# Patient Record
Sex: MALE | Race: WHITE | Employment: FULL TIME | URBAN - NONMETROPOLITAN AREA
[De-identification: names, ages, dates, MRNs, and addresses within clinical notes are randomized per-mention and may not be internally consistent; named-entity substitution may affect disease eponyms.]

---

## 2020-12-19 ENCOUNTER — APPOINTMENT (OUTPATIENT)
Dept: CT IMAGING | Age: 60
End: 2020-12-19

## 2020-12-19 ENCOUNTER — HOSPITAL ENCOUNTER (INPATIENT)
Age: 60
LOS: 4 days | Discharge: HOME | DRG: 683 | End: 2020-12-23
Attending: INTERNAL MEDICINE | Admitting: INTERNAL MEDICINE

## 2020-12-19 ENCOUNTER — HOSPITAL ENCOUNTER (EMERGENCY)
Age: 60
Discharge: ANOTHER HOSPITAL | End: 2020-12-19
Attending: EMERGENCY MEDICINE

## 2020-12-19 VITALS
TEMPERATURE: 98.1 F | RESPIRATION RATE: 26 BRPM | WEIGHT: 260 LBS | OXYGEN SATURATION: 92 % | HEART RATE: 89 BPM | SYSTOLIC BLOOD PRESSURE: 169 MMHG | DIASTOLIC BLOOD PRESSURE: 113 MMHG

## 2020-12-19 PROBLEM — N17.9 ACUTE KIDNEY INJURY (HCC): Status: ACTIVE | Noted: 2020-12-19

## 2020-12-19 PROBLEM — R60.0 LEG EDEMA, RIGHT: Status: ACTIVE | Noted: 2020-12-19

## 2020-12-19 PROBLEM — N13.30 BILATERAL HYDRONEPHROSIS: Status: ACTIVE | Noted: 2020-12-19

## 2020-12-19 PROBLEM — N32.0 BLADDER OBSTRUCTION: Status: ACTIVE | Noted: 2020-12-19

## 2020-12-19 PROBLEM — N28.89 OBSTRUCTION OF KIDNEY: Status: ACTIVE | Noted: 2020-12-19

## 2020-12-19 LAB
-: ABNORMAL
ABSOLUTE EOS #: 0 K/UL (ref 0–0.44)
ABSOLUTE IMMATURE GRANULOCYTE: 0 K/UL (ref 0–0.3)
ABSOLUTE LYMPH #: 0.69 K/UL (ref 1.1–3.7)
ABSOLUTE MONO #: 0.14 K/UL (ref 0.1–1.2)
ALBUMIN SERPL-MCNC: 4.3 G/DL (ref 3.5–5.2)
ALBUMIN/GLOBULIN RATIO: 1 (ref 1–2.5)
ALLEN TEST: ABNORMAL
ALP BLD-CCNC: 94 U/L (ref 40–129)
ALT SERPL-CCNC: 14 U/L (ref 5–41)
AMORPHOUS: ABNORMAL
ANION GAP SERPL CALCULATED.3IONS-SCNC: 14 MMOL/L (ref 9–17)
ANION GAP SERPL CALCULATED.3IONS-SCNC: 20 MMOL/L (ref 9–17)
AST SERPL-CCNC: 10 U/L
BACTERIA: ABNORMAL
BASOPHILS # BLD: 0 % (ref 0–2)
BASOPHILS ABSOLUTE: 0 K/UL (ref 0–0.2)
BILIRUB SERPL-MCNC: 0.33 MG/DL (ref 0.3–1.2)
BILIRUBIN URINE: NEGATIVE
BUN BLDV-MCNC: 126 MG/DL (ref 8–23)
BUN BLDV-MCNC: >336 MG/DL (ref 8–23)
BUN/CREAT BLD: ABNORMAL (ref 9–20)
BUN/CREAT BLD: ABNORMAL (ref 9–20)
CALCIUM SERPL-MCNC: 10 MG/DL (ref 8.6–10.4)
CALCIUM SERPL-MCNC: 9.4 MG/DL (ref 8.6–10.4)
CARBOXYHEMOGLOBIN: ABNORMAL % (ref 0–5)
CASTS UA: ABNORMAL /LPF
CHLORIDE BLD-SCNC: 102 MMOL/L (ref 98–107)
CHLORIDE BLD-SCNC: 99 MMOL/L (ref 98–107)
CO2: 17 MMOL/L (ref 20–31)
CO2: 17 MMOL/L (ref 20–31)
COLOR: YELLOW
COMMENT UA: ABNORMAL
CREAT SERPL-MCNC: 7.88 MG/DL (ref 0.7–1.2)
CREAT SERPL-MCNC: 8.46 MG/DL (ref 0.7–1.2)
CRYSTALS, UA: ABNORMAL /HPF
DIFFERENTIAL TYPE: ABNORMAL
EOSINOPHILS RELATIVE PERCENT: 0 % (ref 1–4)
EPITHELIAL CELLS UA: ABNORMAL /HPF (ref 0–5)
FIO2: 21
GFR AFRICAN AMERICAN: 8 ML/MIN
GFR AFRICAN AMERICAN: 8 ML/MIN
GFR NON-AFRICAN AMERICAN: 6 ML/MIN
GFR NON-AFRICAN AMERICAN: 7 ML/MIN
GFR SERPL CREATININE-BSD FRML MDRD: ABNORMAL ML/MIN/{1.73_M2}
GLUCOSE BLD-MCNC: 160 MG/DL (ref 70–99)
GLUCOSE BLD-MCNC: 167 MG/DL (ref 75–110)
GLUCOSE BLD-MCNC: 171 MG/DL (ref 70–99)
GLUCOSE URINE: NEGATIVE
HCO3 ARTERIAL: 17.5 MMOL/L (ref 22–26)
HCT VFR BLD CALC: 37 % (ref 40.7–50.3)
HEMOGLOBIN: 12.1 G/DL (ref 13–17)
IMMATURE GRANULOCYTES: 0 %
KETONES, URINE: NEGATIVE
LACTIC ACID, WHOLE BLOOD: NORMAL MMOL/L (ref 0.7–2.1)
LACTIC ACID: 1.5 MMOL/L (ref 0.5–2.2)
LEUKOCYTE ESTERASE, URINE: ABNORMAL
LIPASE: 110 U/L (ref 13–60)
LYMPHOCYTES # BLD: 5 % (ref 24–43)
MCH RBC QN AUTO: 29.4 PG (ref 25.2–33.5)
MCHC RBC AUTO-ENTMCNC: 32.7 G/DL (ref 28.4–34.8)
MCV RBC AUTO: 89.8 FL (ref 82.6–102.9)
METHEMOGLOBIN: ABNORMAL % (ref 0–1.9)
MODE: ABNORMAL
MONOCYTES # BLD: 1 % (ref 3–12)
MORPHOLOGY: NORMAL
MUCUS: ABNORMAL
NEGATIVE BASE EXCESS, ART: 6.1 MMOL/L (ref 0–2)
NITRITE, URINE: NEGATIVE
NOTIFICATION TIME: ABNORMAL
NOTIFICATION: ABNORMAL
NRBC AUTOMATED: 0 PER 100 WBC
O2 DEVICE/FLOW/%: ABNORMAL
O2 SAT, ARTERIAL: 94.2 % (ref 95–98)
OTHER OBSERVATIONS UA: ABNORMAL
OXYHEMOGLOBIN: ABNORMAL % (ref 95–98)
PATIENT TEMP: 37
PCO2 ARTERIAL: 30 MMHG (ref 35–45)
PCO2, ART, TEMP ADJ: ABNORMAL (ref 35–45)
PDW BLD-RTO: 12.5 % (ref 11.8–14.4)
PEEP/CPAP: ABNORMAL
PH ARTERIAL: 7.38 (ref 7.35–7.45)
PH UA: 6 (ref 5–9)
PH, ART, TEMP ADJ: ABNORMAL (ref 7.35–7.45)
PLATELET # BLD: 340 K/UL (ref 138–453)
PLATELET ESTIMATE: ABNORMAL
PMV BLD AUTO: 9.3 FL (ref 8.1–13.5)
PO2 ARTERIAL: 70.5 MMHG (ref 80–100)
PO2, ART, TEMP ADJ: ABNORMAL MMHG (ref 80–100)
POSITIVE BASE EXCESS, ART: ABNORMAL MMOL/L (ref 0–2)
POTASSIUM SERPL-SCNC: 5 MMOL/L (ref 3.7–5.3)
POTASSIUM SERPL-SCNC: 5.7 MMOL/L (ref 3.7–5.3)
PROTEIN UA: ABNORMAL
PSV: ABNORMAL
PT. POSITION: ABNORMAL
RBC # BLD: 4.12 M/UL (ref 4.21–5.77)
RBC # BLD: ABNORMAL 10*6/UL
RBC UA: ABNORMAL /HPF (ref 0–2)
RENAL EPITHELIAL, UA: ABNORMAL /HPF
RESPIRATORY RATE: ABNORMAL
SAMPLE SITE: ABNORMAL
SARS-COV-2, RAPID: NOT DETECTED
SARS-COV-2: NORMAL
SARS-COV-2: NORMAL
SEG NEUTROPHILS: 94 % (ref 36–65)
SEGMENTED NEUTROPHILS ABSOLUTE COUNT: 12.87 K/UL (ref 1.5–8.1)
SET RATE: ABNORMAL
SODIUM BLD-SCNC: 133 MMOL/L (ref 135–144)
SODIUM BLD-SCNC: 136 MMOL/L (ref 135–144)
SODIUM,UR: 83 MMOL/L
SOURCE: NORMAL
SPECIFIC GRAVITY UA: 1.01 (ref 1.01–1.02)
TEXT FOR RESPIRATORY: ABNORMAL
TOTAL HB: ABNORMAL G/DL (ref 12–16)
TOTAL PROTEIN: 8.6 G/DL (ref 6.4–8.3)
TOTAL RATE: ABNORMAL
TRICHOMONAS: ABNORMAL
TURBIDITY: CLEAR
URINE HGB: ABNORMAL
UROBILINOGEN, URINE: NORMAL
VT: ABNORMAL
WBC # BLD: 13.7 K/UL (ref 3.5–11.3)
WBC # BLD: ABNORMAL 10*3/UL
WBC UA: ABNORMAL /HPF (ref 0–5)
YEAST: ABNORMAL

## 2020-12-19 PROCEDURE — 84156 ASSAY OF PROTEIN URINE: CPT

## 2020-12-19 PROCEDURE — APPSS45 APP SPLIT SHARED TIME 31-45 MINUTES: Performed by: NURSE PRACTITIONER

## 2020-12-19 PROCEDURE — 96375 TX/PRO/DX INJ NEW DRUG ADDON: CPT

## 2020-12-19 PROCEDURE — U0003 INFECTIOUS AGENT DETECTION BY NUCLEIC ACID (DNA OR RNA); SEVERE ACUTE RESPIRATORY SYNDROME CORONAVIRUS 2 (SARS-COV-2) (CORONAVIRUS DISEASE [COVID-19]), AMPLIFIED PROBE TECHNIQUE, MAKING USE OF HIGH THROUGHPUT TECHNOLOGIES AS DESCRIBED BY CMS-2020-01-R: HCPCS

## 2020-12-19 PROCEDURE — 96376 TX/PRO/DX INJ SAME DRUG ADON: CPT

## 2020-12-19 PROCEDURE — 36415 COLL VENOUS BLD VENIPUNCTURE: CPT

## 2020-12-19 PROCEDURE — 2060000000 HC ICU INTERMEDIATE R&B

## 2020-12-19 PROCEDURE — 80053 COMPREHEN METABOLIC PANEL: CPT

## 2020-12-19 PROCEDURE — 74176 CT ABD & PELVIS W/O CONTRAST: CPT

## 2020-12-19 PROCEDURE — 6370000000 HC RX 637 (ALT 250 FOR IP): Performed by: EMERGENCY MEDICINE

## 2020-12-19 PROCEDURE — 93005 ELECTROCARDIOGRAM TRACING: CPT | Performed by: EMERGENCY MEDICINE

## 2020-12-19 PROCEDURE — 2580000003 HC RX 258: Performed by: INTERNAL MEDICINE

## 2020-12-19 PROCEDURE — 83605 ASSAY OF LACTIC ACID: CPT

## 2020-12-19 PROCEDURE — 36600 WITHDRAWAL OF ARTERIAL BLOOD: CPT

## 2020-12-19 PROCEDURE — 85025 COMPLETE CBC W/AUTO DIFF WBC: CPT

## 2020-12-19 PROCEDURE — 81001 URINALYSIS AUTO W/SCOPE: CPT

## 2020-12-19 PROCEDURE — 87086 URINE CULTURE/COLONY COUNT: CPT

## 2020-12-19 PROCEDURE — 82805 BLOOD GASES W/O2 SATURATION: CPT

## 2020-12-19 PROCEDURE — 96374 THER/PROPH/DIAG INJ IV PUSH: CPT

## 2020-12-19 PROCEDURE — U0002 COVID-19 LAB TEST NON-CDC: HCPCS

## 2020-12-19 PROCEDURE — 84300 ASSAY OF URINE SODIUM: CPT

## 2020-12-19 PROCEDURE — 2500000003 HC RX 250 WO HCPCS: Performed by: EMERGENCY MEDICINE

## 2020-12-19 PROCEDURE — 6360000002 HC RX W HCPCS: Performed by: EMERGENCY MEDICINE

## 2020-12-19 PROCEDURE — 99285 EMERGENCY DEPT VISIT HI MDM: CPT

## 2020-12-19 PROCEDURE — 82947 ASSAY GLUCOSE BLOOD QUANT: CPT

## 2020-12-19 PROCEDURE — 99222 1ST HOSP IP/OBS MODERATE 55: CPT | Performed by: INTERNAL MEDICINE

## 2020-12-19 PROCEDURE — 83690 ASSAY OF LIPASE: CPT

## 2020-12-19 PROCEDURE — 80048 BASIC METABOLIC PNL TOTAL CA: CPT

## 2020-12-19 RX ORDER — ONDANSETRON 2 MG/ML
4 INJECTION INTRAMUSCULAR; INTRAVENOUS ONCE
Status: COMPLETED | OUTPATIENT
Start: 2020-12-19 | End: 2020-12-19

## 2020-12-19 RX ORDER — SODIUM CHLORIDE 9 MG/ML
INJECTION, SOLUTION INTRAVENOUS CONTINUOUS
Status: DISCONTINUED | OUTPATIENT
Start: 2020-12-19 | End: 2020-12-23 | Stop reason: HOSPADM

## 2020-12-19 RX ORDER — SODIUM POLYSTYRENE SULFONATE 15 G/60ML
15 SUSPENSION ORAL; RECTAL
Status: ACTIVE | OUTPATIENT
Start: 2020-12-19 | End: 2020-12-19

## 2020-12-19 RX ORDER — MORPHINE SULFATE 4 MG/ML
4 INJECTION, SOLUTION INTRAMUSCULAR; INTRAVENOUS ONCE
Status: COMPLETED | OUTPATIENT
Start: 2020-12-19 | End: 2020-12-19

## 2020-12-19 RX ORDER — ONDANSETRON 2 MG/ML
8 INJECTION INTRAMUSCULAR; INTRAVENOUS ONCE
Status: COMPLETED | OUTPATIENT
Start: 2020-12-19 | End: 2020-12-19

## 2020-12-19 RX ORDER — SODIUM CHLORIDE 0.9 % (FLUSH) 0.9 %
10 SYRINGE (ML) INJECTION PRN
Status: DISCONTINUED | OUTPATIENT
Start: 2020-12-19 | End: 2020-12-23 | Stop reason: HOSPADM

## 2020-12-19 RX ORDER — FENTANYL CITRATE 50 UG/ML
50 INJECTION, SOLUTION INTRAMUSCULAR; INTRAVENOUS ONCE
Status: COMPLETED | OUTPATIENT
Start: 2020-12-19 | End: 2020-12-19

## 2020-12-19 RX ORDER — ENALAPRILAT 2.5 MG/2ML
0.62 INJECTION INTRAVENOUS ONCE
Status: COMPLETED | OUTPATIENT
Start: 2020-12-19 | End: 2020-12-19

## 2020-12-19 RX ORDER — ONDANSETRON 2 MG/ML
4 INJECTION INTRAMUSCULAR; INTRAVENOUS EVERY 6 HOURS PRN
Status: DISCONTINUED | OUTPATIENT
Start: 2020-12-19 | End: 2020-12-23 | Stop reason: HOSPADM

## 2020-12-19 RX ORDER — NICOTINE 21 MG/24HR
1 PATCH, TRANSDERMAL 24 HOURS TRANSDERMAL DAILY PRN
Status: DISCONTINUED | OUTPATIENT
Start: 2020-12-19 | End: 2020-12-23 | Stop reason: HOSPADM

## 2020-12-19 RX ORDER — PROMETHAZINE HYDROCHLORIDE 12.5 MG/1
12.5 TABLET ORAL EVERY 6 HOURS PRN
Status: DISCONTINUED | OUTPATIENT
Start: 2020-12-19 | End: 2020-12-23 | Stop reason: HOSPADM

## 2020-12-19 RX ORDER — SODIUM POLYSTYRENE SULFONATE 15 G/60ML
30 SUSPENSION ORAL; RECTAL
Status: ACTIVE | OUTPATIENT
Start: 2020-12-19 | End: 2020-12-19

## 2020-12-19 RX ORDER — ACETAMINOPHEN 650 MG/1
650 SUPPOSITORY RECTAL EVERY 6 HOURS PRN
Status: DISCONTINUED | OUTPATIENT
Start: 2020-12-19 | End: 2020-12-23 | Stop reason: HOSPADM

## 2020-12-19 RX ORDER — LORAZEPAM 2 MG/ML
0.5 INJECTION INTRAMUSCULAR ONCE
Status: DISCONTINUED | OUTPATIENT
Start: 2020-12-19 | End: 2020-12-19

## 2020-12-19 RX ORDER — LABETALOL 100 MG/1
100 TABLET, FILM COATED ORAL ONCE
Status: COMPLETED | OUTPATIENT
Start: 2020-12-19 | End: 2020-12-19

## 2020-12-19 RX ORDER — LABETALOL 100 MG/1
200 TABLET, FILM COATED ORAL ONCE
Status: COMPLETED | OUTPATIENT
Start: 2020-12-19 | End: 2020-12-19

## 2020-12-19 RX ORDER — SODIUM CHLORIDE 0.9 % (FLUSH) 0.9 %
10 SYRINGE (ML) INJECTION EVERY 12 HOURS SCHEDULED
Status: DISCONTINUED | OUTPATIENT
Start: 2020-12-19 | End: 2020-12-23 | Stop reason: HOSPADM

## 2020-12-19 RX ORDER — ACETAMINOPHEN 325 MG/1
650 TABLET ORAL EVERY 6 HOURS PRN
Status: DISCONTINUED | OUTPATIENT
Start: 2020-12-19 | End: 2020-12-23 | Stop reason: HOSPADM

## 2020-12-19 RX ADMIN — LABETALOL HYDROCHLORIDE 200 MG: 100 TABLET, FILM COATED ORAL at 16:08

## 2020-12-19 RX ADMIN — SODIUM CHLORIDE: 9 INJECTION, SOLUTION INTRAVENOUS at 18:08

## 2020-12-19 RX ADMIN — LABETALOL HYDROCHLORIDE 100 MG: 100 TABLET, FILM COATED ORAL at 09:52

## 2020-12-19 RX ADMIN — MORPHINE SULFATE 4 MG: 4 INJECTION, SOLUTION INTRAMUSCULAR; INTRAVENOUS at 11:43

## 2020-12-19 RX ADMIN — ENALAPRILAT 0.62 MG: 1.25 INJECTION INTRAVENOUS at 11:22

## 2020-12-19 RX ADMIN — ONDANSETRON 4 MG: 2 INJECTION INTRAMUSCULAR; INTRAVENOUS at 11:43

## 2020-12-19 RX ADMIN — FENTANYL CITRATE 50 MCG: 50 INJECTION, SOLUTION INTRAMUSCULAR; INTRAVENOUS at 16:08

## 2020-12-19 RX ADMIN — MORPHINE SULFATE 4 MG: 4 INJECTION, SOLUTION INTRAMUSCULAR; INTRAVENOUS at 07:27

## 2020-12-19 RX ADMIN — ONDANSETRON 8 MG: 2 INJECTION INTRAMUSCULAR; INTRAVENOUS at 07:25

## 2020-12-19 ASSESSMENT — PAIN SCALES - GENERAL
PAINLEVEL_OUTOF10: 0
PAINLEVEL_OUTOF10: 5
PAINLEVEL_OUTOF10: 5
PAINLEVEL_OUTOF10: 6
PAINLEVEL_OUTOF10: 10
PAINLEVEL_OUTOF10: 0
PAINLEVEL_OUTOF10: 5
PAINLEVEL_OUTOF10: 10
PAINLEVEL_OUTOF10: 8

## 2020-12-19 ASSESSMENT — ENCOUNTER SYMPTOMS
COLOR CHANGE: 0
NAUSEA: 0
NAUSEA: 1
BACK PAIN: 1
VOMITING: 1
COUGH: 0
ABDOMINAL PAIN: 0
ABDOMINAL PAIN: 1
VOMITING: 0
WHEEZING: 0
RESPIRATORY NEGATIVE: 1
ABDOMINAL DISTENTION: 1
CONSTIPATION: 0
ALLERGIC/IMMUNOLOGIC NEGATIVE: 1
DIARRHEA: 0
SHORTNESS OF BREATH: 0
EYES NEGATIVE: 1

## 2020-12-19 ASSESSMENT — PAIN DESCRIPTION - PAIN TYPE
TYPE: ACUTE PAIN
TYPE: ACUTE PAIN

## 2020-12-19 ASSESSMENT — PAIN DESCRIPTION - LOCATION
LOCATION: FLANK
LOCATION: FLANK

## 2020-12-19 ASSESSMENT — PAIN DESCRIPTION - DESCRIPTORS
DESCRIPTORS: SHARP;SPASM
DESCRIPTORS: SHARP;SPASM

## 2020-12-19 ASSESSMENT — PAIN DESCRIPTION - ORIENTATION
ORIENTATION: RIGHT
ORIENTATION: RIGHT

## 2020-12-19 ASSESSMENT — PAIN DESCRIPTION - FREQUENCY
FREQUENCY: CONTINUOUS
FREQUENCY: CONTINUOUS

## 2020-12-19 ASSESSMENT — PAIN DESCRIPTION - PROGRESSION: CLINICAL_PROGRESSION: NOT CHANGED

## 2020-12-19 NOTE — ED NOTES
Contacted [facility] requesting update on bed assignment. Provided with information. Making sure transport can be arranged due to the distance away.      Orysia Mortimer A Reser  12/19/20 3147

## 2020-12-19 NOTE — H&P
[facility]  Office: [address], DO, Wilder Gear, DO, Jalyn , DO, Em Hoskins Blood, DO, Homero Sanchez MD, Babatunde Lyons MD, Bernadette Alva MD, Ricardo Frias MD, Darnell Burnett MD, Carmita Marroquin MD, Janel Pollack MD, Mamie Chandler MD, Paul Herbert MD, Nan Robert, DO, Juvencio Yao MD, Roxi Guan MD, Harlan Guerrero, DO, Leah Vasquez MD,  Sia Diaz DO, Low Puente MD, Saida Tavares MD, Christian Preston, CNP, [facility] CenterSAMIRA Nicole, CNP, Martínez Vanegas, CNP, Margaret Mask, CNS, Eric [place], CNP, Cheryl Parada, CNP, Sharon Loo, CNP, Jyaa Yepez, CNP, Knig Medellin, CNP, Lendel Nissen, PA-C, Shahab Gross, DNP, Jeffery Clarke, CNP, Ketty Blunt, CNP, Christina Kahn, CNP, Tera Claudio, CNP, Romario Augustine, CNP         [facility]   [address] [facility]    HISTORY AND PHYSICAL EXAMINATION            Date:   12/19/2020  Patient name:  Niesha Boothe  Date of admission:  12/19/2020  5:24 PM  MRN:   [id number]  Account:  [de-identified]  YOB: 1960  PCP:    No primary care provider on file.   Room:   [address] [place] [address]  Code Status:    Full Code    Chief Complaint:     Flank pain     History Obtained From:     patient, electronic medical record    History of Present Illness: Niesha Boothe is a pleasant, cooperative 68-year-old male who presented to outlying facility with complaints of right flank pain. He states he has had increased pain over the last several days with decreased urine output. A Trevino catheter was placed in which 8 L of urine was drained. ED work-up at [place] revealed a BUN  <336 with a creatinine of 7.88. He has no known history of nephrologic problems. He has had urinary retention in the past but takes no medications currently. He does endorse a 1 month history of right lower leg edema. He has a remote history of PE and was on Eliquis approximately 7 years ago x 6 months. Currently, patient denies nausea, vomiting, chest pain, fever, chills or shortness of breath. He is employed as a , is independent in his ADLs, and takes no home medications. He denies use of alcohol, tobacco, or recreational drugs. Past Medical History:     None     Past Surgical History:     Past Surgical History:   Procedure Laterality Date    CHOLECYSTECTOMY          Medications Prior to Admission:     None     Allergies:     Patient has no known allergies. Social History:     Tobacco:    reports that he has never smoked. He has never used smokeless tobacco.  Alcohol:      has no history on file for alcohol. Drug Use:  has no history on file for drug. Family History:     Patient denies any pertinent family history     Review of Systems:     Positive and Negative as described in HPI. Review of Systems   Constitutional: Negative. HENT: Negative. Eyes: Negative. Respiratory: Negative. Cardiovascular: Positive for leg swelling. Negative for chest pain. Gastrointestinal: Positive for abdominal pain. Negative for nausea and vomiting. Endocrine: Negative. Genitourinary: Positive for decreased urine volume and flank pain. Musculoskeletal: Positive for gait problem. Negative for arthralgias and myalgias. Skin: Negative. Allergic/Immunologic: Negative. Neurological: Negative for dizziness and headaches. Hematological: Negative. Psychiatric/Behavioral: Negative. Physical Exam:   BP (!) 140/99   Pulse 78   Temp (!) 46.8 °F (8.2 °C) (Oral)   Resp 17   Wt 250 lb 14.1 oz (113.8 kg)   SpO2 96%   Temp (24hrs), Av.5 °F (22.5 °C), Min:46.8 °F (8.2 °C), Max:98.1 °F (36.7 °C)    No results for input(s): POCGLU in the last 72 hours. No intake or output data in the 24 hours ending 20 1808    Physical Exam  Vitals signs and nursing note reviewed. Constitutional:       Appearance: Normal appearance. He is not ill-appearing or toxic-appearing. HENT:      Mouth/Throat:      Mouth: Mucous membranes are dry. Pharynx: Oropharynx is clear. Eyes:      Pupils: Pupils are equal, round, and reactive to light. Neck:      Musculoskeletal: Full passive range of motion without pain and normal range of motion. Trachea: Trachea normal.   Cardiovascular:      Rate and Rhythm: Normal rate and regular rhythm. Pulses: Normal pulses. Heart sounds: Normal heart sounds, S1 normal and S2 normal. No murmur. No friction rub. No gallop. Comments:  Trace left lower leg edema Homans negative bilaterally  Pulmonary:      Effort: Pulmonary effort is normal.      Breath sounds: Normal breath sounds. Abdominal:      General: Abdomen is protuberant. Palpations: Abdomen is soft. There is no mass. Tenderness: There is generalized abdominal tenderness. There is guarding. There is no right CVA tenderness or left CVA tenderness. Genitourinary:     Comments: Indwelling Trevino catheter intact with clear red urine output. Musculoskeletal: Normal range of motion. Right lower le+ Pitting Edema present. Comments: Denies specific musculoskeletal pain. Lymphadenopathy:      Head:      Right side of head: No submental, submandibular, preauricular or posterior auricular adenopathy. Left side of head: No submental, submandibular, preauricular or posterior auricular adenopathy. Skin:     General: Skin is warm and dry. Capillary Refill: Capillary refill takes less than 2 seconds. Coloration: Skin is pale. Findings: Erythema present. Neurological:      General: No focal deficit present. Mental Status: He is alert and oriented to person, place, and time. Psychiatric:         Attention and Perception: Attention normal.         Mood and Affect: Mood and affect normal.         Behavior: Behavior is cooperative.          Investigations:      Laboratory Testing:  Recent Results (from the past 24 hour(s))   CBC Auto Differential    Collection Time: 12/19/20  6:30 AM   Result Value Ref Range    WBC 13.7 (H) 3.5 - 11.3 k/uL    RBC 4.12 (L) 4.21 - 5.77 m/uL    Hemoglobin 12.1 (L) 13.0 - 17.0 g/dL    Hematocrit 37.0 (L) 40.7 - 50.3 %    MCV 89.8 82.6 - 102.9 fL    MCH 29.4 25.2 - 33.5 pg    MCHC 32.7 28.4 - 34.8 g/dL    RDW 12.5 11.8 - 14.4 %    Platelets 595 166 - 524 k/uL    MPV 9.3 8.1 - 13.5 fL    NRBC Automated 0.0 0.0 per 100 WBC    Differential Type NOT REPORTED     WBC Morphology NOT REPORTED     RBC Morphology NOT REPORTED     Platelet Estimate NOT REPORTED     Seg Neutrophils 94 (H) 36 - 65 %    Lymphocytes 5 (L) 24 - 43 %    Monocytes 1 (L) 3 - 12 %    Eosinophils % 0 (L) 1 - 4 %    Immature Granulocytes 0 0 %    Basophils 0 0 - 2 %    Segs Absolute 12.87 (H) 1.50 - 8.10 k/uL    Absolute Lymph # 0.69 (L) 1.10 - 3.70 k/uL    Absolute Mono # 0.14 0.10 - 1.20 k/uL    Absolute Eos # 0.00 0.00 - 0.44 k/uL    Absolute Immature Granulocyte 0.00 0.00 - 0.30 k/uL    Basophils Absolute 0.00 0.0 - 0.2 k/uL    Morphology Normal    Comprehensive Metabolic Panel    Collection Time: 12/19/20  6:30 AM   Result Value Ref Range    Glucose 160 (H) 70 - 99 mg/dL    BUN >336 (HH) 8 - 23 mg/dL    CREATININE 7.88 () 0.70 - 1.20 mg/dL    Bun/Cre Ratio CANNOT BE CALCULATED 9 - 20 Calcium 10.0 8.6 - 10.4 mg/dL    Sodium 136 135 - 144 mmol/L    Potassium 5.0 3.7 - 5.3 mmol/L    Chloride 99 98 - 107 mmol/L    CO2 17 (L) 20 - 31 mmol/L    Anion Gap 20 (H) 9 - 17 mmol/L    Alkaline Phosphatase 94 40 - 129 U/L    ALT 14 5 - 41 U/L    AST 10 <40 U/L    Total Bilirubin 0.33 0.3 - 1.2 mg/dL    Total Protein 8.6 (H) 6.4 - 8.3 g/dL    Alb 4.3 3.5 - 5.2 g/dL    Albumin/Globulin Ratio 1.0 1.0 - 2.5    GFR Non-African American 7 (L) >60 mL/min    GFR  8 (L) >60 mL/min    GFR Comment          GFR Staging         Lactic Acid, Plasma    Collection Time: 12/19/20  6:30 AM   Result Value Ref Range    Lactic Acid 1.5 0.5 - 2.2 mmol/L    Lactic Acid, Whole Blood NOT REPORTED 0.7 - 2.1 mmol/L   Lipase    Collection Time: 12/19/20  6:30 AM   Result Value Ref Range    Lipase 110 (H) 13 - 60 U/L   EKG 12 Lead    Collection Time: 12/19/20  6:50 AM   Result Value Ref Range    Ventricular Rate 97 BPM    Atrial Rate 97 BPM    P-R Interval 158 ms    QRS Duration 92 ms    Q-T Interval 364 ms    QTc Calculation (Bazett) 462 ms    P Axis 33 degrees    R Axis -38 degrees    T Axis 32 degrees   Urinalysis with Microscopic    Collection Time: 12/19/20  8:30 AM   Result Value Ref Range    Color, UA YELLOW YELLOW    Turbidity UA CLEAR CLEAR    Glucose, Ur NEGATIVE NEGATIVE    Bilirubin Urine NEGATIVE NEGATIVE    Ketones, Urine NEGATIVE NEGATIVE    Specific [place], UA 1.010 1.010 - 1.020    Urine Hgb 1+ (A) NEGATIVE    pH, UA 6.0 5.0 - 9.0    Protein, UA TRACE (A) NEGATIVE    Urobilinogen, Urine Normal Normal    Nitrite, Urine NEGATIVE NEGATIVE    Leukocyte Esterase, Urine SMALL (A) NEGATIVE    Urinalysis Comments NOT REPORTED     -          WBC, UA 5 TO 10 0 - 5 /HPF    RBC, UA 0 TO 2 0 - 2 /HPF    Casts UA NOT REPORTED /LPF    Crystals, UA NOT REPORTED None /HPF    Epithelial Cells UA 0 TO 2 0 - 5 /HPF    Renal Epithelial, UA NOT REPORTED 0 /HPF    Bacteria, UA 3+ (A) None    Mucus, UA NOT REPORTED None Trichomonas, UA NOT REPORTED None    Amorphous, UA NOT REPORTED None    Other Observations UA NOT REPORTED NOT REQ. Yeast, UA NOT REPORTED None   Blood Gas, Arterial    Collection Time: 12/19/20  8:55 AM   Result Value Ref Range    pH, Arterial 7.384 7.35 - 7.45    pCO2, Arterial 30.0 (L) 35 - 45 mmHg    pO2, Arterial 70.5 (L) 80.0 - 100.0 mmHg    HCO3, Arterial 17.5 (L) 22 - 26 mmol/L    Positive Base Excess, Art NOT REPORTED 0.0 - 2.0 mmol/L    Negative Base Excess, Art 6.1 (H) 0.0 - 2.0 mmol/L    O2 Sat, Arterial 94.2 (L) 95 - 98 %    Total Hb NOT REPORTED 12.0 - 16.0 g/dl    Oxyhemoglobin NOT REPORTED 95.0 - 98.0 %    Carboxyhemoglobin NOT REPORTED 0.0 - 5.0 %    Methemoglobin NOT REPORTED 0.0 - 1.9 %    Pt Temp 37.0     pH, Art, Temp Adj NOT REPORTED 7.350 - 7.450    pCO2, Art, Temp Adj NOT REPORTED 35.0 - 45.0    pO2, Art, Temp Adj NOT REPORTED 80.0 - 100.0 mmHg    O2 Device/Flow/% ROOM AIR     Respiratory Rate NOT REPORTED     Randolph Test PASS     Sample Site Right Radial Artery     Pt. Position NOT REPORTED     Mode NOT REPORTED     Set Rate NOT REPORTED     Total Rate NOT REPORTED     VT NOT REPORTED     FIO2 21     Peep/Cpap NOT REPORTED     PSV NOT REPORTED     Text for Respiratory NOT REPORTED     NOTIFICATION NOT REPORTED     NOTIFICATION TIME NOT REPORTED    COVID-19    Collection Time: 12/19/20  9:46 AM    Specimen: Other   Result Value Ref Range    SARS-CoV-2          SARS-CoV-2, Rapid Not Detected Not Detected    Source . NASOPHARYNGEAL SWAB     SARS-CoV-2             Imaging/Diagnostics:  Ct Abdomen Pelvis Wo Contrast    Addendum Date: 12/19/2020 ADDENDUM: Noted within the body of the report there is a 2 mm left lower lobe nodule likely requiring no specific follow-up. Please refer to guidelines below. Guidelines for follow-up and management of pulmonary nodules found on abdomen CT: <6 mm - No follow up recommend on the basis of the estimated low risk of malignancy. 6-8-mm - recommend follow-up chest CT after an appropriate interval (3-12 months depending on clinical risk). >8mm - immediate chest CT for further evaluation. Radiology 2017 http://pubs. rsna.org/doi/full/10.1148/radiol. 1327243102     Result Date: 12/19/2020  Severe bilateral hydronephrosis. Markedly distended urinary bladder. No obstructing renal calculus is visualized. Stranding and fluid surrounding the right kidney. Findings could be related to back flow from severe chronic distention of the urinary bladder. Infection is not excluded on the basis of this examination. Urology consultation should be considered. Assessment :      Hospital Problems           Last Modified POA    * (Principal) Acute kidney injury ([address] 12/19/2020 Yes    Obstruction of kidney 12/19/2020 Yes    Bilateral hydronephrosis 12/19/2020 Yes    Bladder obstruction 12/19/2020 Yes    Leg edema, right 12/19/2020 Yes          Plan:     Patient status inpatient in the Progressive Unit/Step down    1. Consult to urology. Appreciate input. Will continue monitoring urine output. 2. Inpatient consult to nephrology. Will trend kidney function. 3. Renal ultrasound and urine studies. 4. Venous Doppler bilaterally. Rule out DVT. Continue DVT prophylaxis with chemical anticoagulation. 5. GI prophylaxis  6. PT/OT   7. Case management for home-going needs and safety.     Consultations:   IP CONSULT TO UROLOGY  IP CONSULT TO NEPHROLOGY Patient is admitted as inpatient status because of co-morbidities listed above, severity of signs and symptoms as outlined, requirement for current medical therapies and most importantly because of direct risk to patient if care not provided in a hospital setting. Expected length of stay > 48 hours. SHARON Cochran NP  12/19/2020  6:08 PM    Attending Supervising Physicians Attestation Statement  I have seen and examined Rajiv Ledbetter and the gomez elements of all parts of the encounter have been performed by me. I agree with the assessment, plan and orders as documented by the Advanced Practice Provider. I discussed the findings and plans with the nurse practitioner and agree as documented in her note .     Additional Comments:   56 M presented with back pain. Going on for the past day. Thought he had kidney stone. Came to ER, found to have NICOLE. Trevino placed with 8 L taken out. Transferred to [facility] for urology/nephrology evaluation. On exam, abdomen soft/nontender. Trevino in place with hematuria. CT scan reviewed. Discussed with urology resident, may need CBI.  Continue to monitor renal function and urine output.      Electronically signed by Kai Lyman MD on 12/19/2020 at 5:49 PM

## 2020-12-19 NOTE — ED NOTES
[address] Gallo for hospitalist at [facility] per Dr. Abundio Oro request.     Hollice Duverney A Reser  12/19/20 1537

## 2020-12-19 NOTE — ED NOTES
93 Sanjuana Gallo for hospitalist at [facility] per Dr. Abundio Oro request.     Hollice Duverney A Reser  12/19/20 6138

## 2020-12-19 NOTE — ED NOTES
Per [employer], no beds available at [facility]. Access is calling bed board at [address]. V's again but stated most facilities are discharge dependent at this time.      Willam BELL Reser  12/19/20 6899

## 2020-12-19 NOTE — ED PROVIDER NOTES
[facility]  EMERGENCY DEPARTMENT ENCOUNTER      Pt Joan Cook  MRN: [id number]  Birthdate 1960  Date of evaluation: 12/19/2020  Provider: Joy Fiore MD    [address] [place] [address]     Chief Complaint   Patient presents with    Flank Pain     right sided, onset at [address]   (Location/Symptom, Timing/Onset, Context/Setting, Quality, Duration, Modifying Factors, Severity)  Note limiting factors. HPI the patient is a 78-year-old male who has right-sided pain. He presents with right flank pain that radiates around to his abdomen. He is having level 7-8 pain when the pain spasms otherwise he has minimal pain. He has no history of a kidney stone. He has had a cholecystectomy. The pain has been present since 1 AM.    Nursing Notes were reviewed. REVIEW OF SYSTEMS    (2-9 systems for level 4, 10 or more for level 5)     Review of Systems   Constitutional: Positive for activity change, appetite change and fatigue. HENT: Negative for congestion. Respiratory: Negative for cough, shortness of breath and wheezing. Cardiovascular: Positive for leg swelling. Negative for chest pain and palpitations. Gastrointestinal: Positive for abdominal distention, nausea and vomiting. Negative for abdominal pain, constipation and diarrhea. Genitourinary: Positive for flank pain. Negative for difficulty urinating, dysuria and frequency. Musculoskeletal: Positive for back pain. Skin: Negative for color change and pallor. Neurological: Negative for dizziness, light-headedness and headaches. Psychiatric/Behavioral: Negative for confusion, decreased concentration and dysphoric mood. MEDICAL HISTORY   History reviewed. No pertinent past medical history.       SURGICAL HISTORY       Past Surgical History:   Procedure Laterality Date    CHOLECYSTECTOMY           CURRENT MEDICATIONS       There are no discharge medications for this Neck:      Musculoskeletal: Normal range of motion and neck supple. Cardiovascular:      Rate and Rhythm: Normal rate and regular rhythm. Pulses: Normal pulses. Heart sounds: Normal heart sounds. Pulmonary:      Effort: Pulmonary effort is normal.      Breath sounds: Normal breath sounds. Abdominal:      General: Bowel sounds are normal.      Tenderness: There is no abdominal tenderness. Comments: The patient has a large amount of ascites. Musculoskeletal: Normal range of motion. General: Swelling present. Right lower leg: Edema present. Left lower leg: Edema present. Skin:     General: Skin is warm and dry. Neurological:      General: No focal deficit present. Mental Status: He is alert and oriented to person, place, and time. Mental status is at baseline. Psychiatric:         Mood and Affect: Mood normal.         Behavior: Behavior normal.         Thought Content: Thought content normal.         Judgment: Judgment normal.         DIAGNOSTIC RESULTS     EKG: All EKG's are interpreted by the Emergency Department Physician whoeither signs or Co-signs this chart in the absence of a cardiologist.  Normal sinus rhythm at a rate of 97. Normal intervals. Left axis of -38. Poor progression of the R wave probably due to lead placement. No acute ischemic changes. RADIOLOGY:   Non-plain film images such as CT, Ultrasound and MRI are read by the radiologist. Plain radiographic images are visualized and preliminarily interpreted by the emergency physician     Interpretation per the Radiologist below, if available at the time of this note:    CT ABDOMEN PELVIS WO CONTRAST   Final Result   Addendum 1 of 1   ADDENDUM:   Noted within the body of the report there is a 2 mm left lower lobe nodule   likely requiring no specific follow-up. Please refer to guidelines below.       Guidelines for follow-up and management of pulmonary nodules found on    abdomen   CT:      <6 mm 184/108 (!) 162/119 (!) 174/110 (!) 169/113   Pulse: 94 91 91 89   Resp: 27 27 27 26   Temp:       TempSrc:       SpO2: 96% 92% 92% 92%   Weight:               MDM when patient was catheterized 8400 cc of urine was obtained. The urine does not appear to be infected. I feel the patient requires transfer to the hospital where he can be seen by nephrology and urology. Arrangements are being made to transfer to [address] [place] [address]. I started labetalol for patient's elevated blood pressure. CONSULTS:  None    PROCEDURES:  Unless otherwise noted below, none     Procedures    FINAL IMPRESSION      1. Bladder obstruction    2. Bilateral hydronephrosis    3. Obstruction of kidney          DISPOSITION/PLAN   DISPOSITION Decision To Transfer 12/19/2020 04:26:19 PM      PATIENT REFERRED TO:  No follow-up provider specified. DISCHARGE MEDICATIONS:  There are no discharge medications for this patient.              (Please note that portions ofthis note were completed with a voice recognition program.  Efforts were made to edit the dictations but occasionally words are mis-transcribed.)      Tk Grewal MD (electronically signed)  Attending Emergency Physician         Linda Don MD  12/23/20 0677

## 2020-12-19 NOTE — ED NOTES
[facility] called with hospitalist from [facility]. V's on line to speak with Dr. Zehra Ross.      Orysia Mortimer A Reser  12/19/20 2613

## 2020-12-19 NOTE — ED NOTES
Dr. Gino Hall made aware that pt's BP has not gone down with interventions     Christy Baker, RN  12/19/20 7197

## 2020-12-19 NOTE — ED NOTES
Tahira Dockery in department for transport. Pt report given to crew.      Jacquelin Fonseca RN  12/19/20 6435

## 2020-12-19 NOTE — ED NOTES
Per [employer] [facility] at [facility] states something is wrong with the shower in the room this pt is being transferred to. Was told to [facility] off\" on transport.      Shari BELL Reser  12/19/20 7300

## 2020-12-20 ENCOUNTER — APPOINTMENT (OUTPATIENT)
Dept: ULTRASOUND IMAGING | Age: 60
DRG: 683 | End: 2020-12-20
Attending: INTERNAL MEDICINE

## 2020-12-20 ENCOUNTER — APPOINTMENT (OUTPATIENT)
Dept: DIALYSIS | Age: 60
DRG: 683 | End: 2020-12-20
Attending: INTERNAL MEDICINE

## 2020-12-20 ENCOUNTER — APPOINTMENT (OUTPATIENT)
Dept: INTERVENTIONAL RADIOLOGY/VASCULAR | Age: 60
DRG: 683 | End: 2020-12-20
Attending: INTERNAL MEDICINE

## 2020-12-20 LAB
-: ABNORMAL
ALBUMIN SERPL-MCNC: 3.1 G/DL (ref 3.5–5.2)
ALBUMIN/GLOBULIN RATIO: 1.1 (ref 1–2.5)
ALP BLD-CCNC: 73 U/L (ref 40–129)
ALT SERPL-CCNC: 10 U/L (ref 5–41)
AMORPHOUS: ABNORMAL
ANION GAP SERPL CALCULATED.3IONS-SCNC: 15 MMOL/L (ref 9–17)
AST SERPL-CCNC: 7 U/L
BACTERIA: ABNORMAL
BILIRUB SERPL-MCNC: 0.37 MG/DL (ref 0.3–1.2)
BILIRUBIN URINE: ABNORMAL
BUN BLDV-MCNC: 136 MG/DL (ref 8–23)
BUN/CREAT BLD: ABNORMAL (ref 9–20)
CALCIUM SERPL-MCNC: 8.9 MG/DL (ref 8.6–10.4)
CASTS UA: ABNORMAL /LPF (ref 0–2)
CHLORIDE BLD-SCNC: 98 MMOL/L (ref 98–107)
CO2: 20 MMOL/L (ref 20–31)
COLOR: ABNORMAL
COMPLEMENT C3: 112 MG/DL (ref 90–180)
COMPLEMENT C4: 24 MG/DL (ref 10–40)
CREAT SERPL-MCNC: 8.28 MG/DL (ref 0.7–1.2)
CRYSTALS, UA: ABNORMAL /HPF
EKG ATRIAL RATE: 97 BPM
EKG P AXIS: 33 DEGREES
EKG P-R INTERVAL: 158 MS
EKG Q-T INTERVAL: 364 MS
EKG QRS DURATION: 92 MS
EKG QTC CALCULATION (BAZETT): 462 MS
EKG R AXIS: -38 DEGREES
EKG T AXIS: 32 DEGREES
EKG VENTRICULAR RATE: 97 BPM
EPITHELIAL CELLS UA: ABNORMAL /HPF (ref 0–5)
FREE KAPPA/LAMBDA RATIO: 2.53 (ref 0.26–1.65)
GFR AFRICAN AMERICAN: 8 ML/MIN
GFR NON-AFRICAN AMERICAN: 7 ML/MIN
GFR SERPL CREATININE-BSD FRML MDRD: ABNORMAL ML/MIN/{1.73_M2}
GFR SERPL CREATININE-BSD FRML MDRD: ABNORMAL ML/MIN/{1.73_M2}
GLUCOSE BLD-MCNC: 137 MG/DL (ref 70–99)
GLUCOSE URINE: ABNORMAL
HAV IGM SER IA-ACNC: NONREACTIVE
HCT VFR BLD CALC: 33.2 % (ref 40.7–50.3)
HEMOGLOBIN: 10.8 G/DL (ref 13–17)
HEPATITIS B CORE IGM ANTIBODY: NONREACTIVE
HEPATITIS B SURFACE ANTIGEN: NONREACTIVE
HEPATITIS C ANTIBODY: NONREACTIVE
INR BLD: 1
KAPPA FREE LIGHT CHAINS QNT: 8.49 MG/DL (ref 0.37–1.94)
KETONES, URINE: NEGATIVE
LAMBDA FREE LIGHT CHAINS QNT: 3.36 MG/DL (ref 0.57–2.63)
LEUKOCYTE ESTERASE, URINE: ABNORMAL
MAGNESIUM: 1.8 MG/DL (ref 1.6–2.6)
MCH RBC QN AUTO: 29.3 PG (ref 25.2–33.5)
MCHC RBC AUTO-ENTMCNC: 32.5 G/DL (ref 28.4–34.8)
MCV RBC AUTO: 90.2 FL (ref 82.6–102.9)
MUCUS: ABNORMAL
NITRITE, URINE: POSITIVE
NRBC AUTOMATED: 0 PER 100 WBC
OTHER OBSERVATIONS UA: ABNORMAL
PDW BLD-RTO: 13 % (ref 11.8–14.4)
PH UA: 7.5 (ref 5–8)
PLATELET # BLD: 322 K/UL (ref 138–453)
PMV BLD AUTO: 9.9 FL (ref 8.1–13.5)
POTASSIUM SERPL-SCNC: 5.1 MMOL/L (ref 3.7–5.3)
PROTEIN UA: ABNORMAL
PROTHROMBIN TIME: 10.7 SEC (ref 9–12)
RBC # BLD: 3.68 M/UL (ref 4.21–5.77)
RBC UA: ABNORMAL /HPF (ref 0–2)
RENAL EPITHELIAL, UA: ABNORMAL /HPF
SODIUM BLD-SCNC: 133 MMOL/L (ref 135–144)
SPECIFIC GRAVITY UA: 1.01 (ref 1–1.03)
TOTAL PROTEIN, URINE: 285 MG/DL
TOTAL PROTEIN: 6 G/DL (ref 6.4–8.3)
TRICHOMONAS: ABNORMAL
TURBIDITY: ABNORMAL
URINE HGB: ABNORMAL
UROBILINOGEN, URINE: NORMAL
WBC # BLD: 18.3 K/UL (ref 3.5–11.3)
WBC UA: ABNORMAL /HPF (ref 0–5)
YEAST: ABNORMAL

## 2020-12-20 PROCEDURE — C1894 INTRO/SHEATH, NON-LASER: HCPCS

## 2020-12-20 PROCEDURE — 85610 PROTHROMBIN TIME: CPT

## 2020-12-20 PROCEDURE — 5A1D70Z PERFORMANCE OF URINARY FILTRATION, INTERMITTENT, LESS THAN 6 HOURS PER DAY: ICD-10-PCS | Performed by: INTERNAL MEDICINE

## 2020-12-20 PROCEDURE — 6360000002 HC RX W HCPCS: Performed by: STUDENT IN AN ORGANIZED HEALTH CARE EDUCATION/TRAINING PROGRAM

## 2020-12-20 PROCEDURE — 86160 COMPLEMENT ANTIGEN: CPT

## 2020-12-20 PROCEDURE — 83735 ASSAY OF MAGNESIUM: CPT

## 2020-12-20 PROCEDURE — 82570 ASSAY OF URINE CREATININE: CPT

## 2020-12-20 PROCEDURE — 02HV33Z INSERTION OF INFUSION DEVICE INTO SUPERIOR VENA CAVA, PERCUTANEOUS APPROACH: ICD-10-PCS | Performed by: RADIOLOGY

## 2020-12-20 PROCEDURE — 84156 ASSAY OF PROTEIN URINE: CPT

## 2020-12-20 PROCEDURE — 80053 COMPREHEN METABOLIC PANEL: CPT

## 2020-12-20 PROCEDURE — 36556 INSERT NON-TUNNEL CV CATH: CPT | Performed by: RADIOLOGY

## 2020-12-20 PROCEDURE — 85027 COMPLETE CBC AUTOMATED: CPT

## 2020-12-20 PROCEDURE — 99233 SBSQ HOSP IP/OBS HIGH 50: CPT | Performed by: INTERNAL MEDICINE

## 2020-12-20 PROCEDURE — 80074 ACUTE HEPATITIS PANEL: CPT

## 2020-12-20 PROCEDURE — 2500000003 HC RX 250 WO HCPCS: Performed by: INTERNAL MEDICINE

## 2020-12-20 PROCEDURE — 86335 IMMUNFIX E-PHORSIS/URINE/CSF: CPT

## 2020-12-20 PROCEDURE — 2060000000 HC ICU INTERMEDIATE R&B

## 2020-12-20 PROCEDURE — C1752 CATH,HEMODIALYSIS,SHORT-TERM: HCPCS

## 2020-12-20 PROCEDURE — 51702 INSERT TEMP BLADDER CATH: CPT

## 2020-12-20 PROCEDURE — 90935 HEMODIALYSIS ONE EVALUATION: CPT

## 2020-12-20 PROCEDURE — 2580000003 HC RX 258: Performed by: INTERNAL MEDICINE

## 2020-12-20 PROCEDURE — 83883 ASSAY NEPHELOMETRY NOT SPEC: CPT

## 2020-12-20 PROCEDURE — 86038 ANTINUCLEAR ANTIBODIES: CPT

## 2020-12-20 PROCEDURE — 6370000000 HC RX 637 (ALT 250 FOR IP): Performed by: STUDENT IN AN ORGANIZED HEALTH CARE EDUCATION/TRAINING PROGRAM

## 2020-12-20 PROCEDURE — 77001 FLUOROGUIDE FOR VEIN DEVICE: CPT | Performed by: RADIOLOGY

## 2020-12-20 PROCEDURE — 99255 IP/OBS CONSLTJ NEW/EST HI 80: CPT | Performed by: INTERNAL MEDICINE

## 2020-12-20 PROCEDURE — 84165 PROTEIN E-PHORESIS SERUM: CPT

## 2020-12-20 PROCEDURE — 6360000002 HC RX W HCPCS: Performed by: RADIOLOGY

## 2020-12-20 PROCEDURE — 76770 US EXAM ABDO BACK WALL COMP: CPT

## 2020-12-20 PROCEDURE — 93010 ELECTROCARDIOGRAM REPORT: CPT | Performed by: INTERNAL MEDICINE

## 2020-12-20 PROCEDURE — C1769 GUIDE WIRE: HCPCS

## 2020-12-20 PROCEDURE — 94761 N-INVAS EAR/PLS OXIMETRY MLT: CPT

## 2020-12-20 PROCEDURE — 36415 COLL VENOUS BLD VENIPUNCTURE: CPT

## 2020-12-20 PROCEDURE — 2580000003 HC RX 258: Performed by: STUDENT IN AN ORGANIZED HEALTH CARE EDUCATION/TRAINING PROGRAM

## 2020-12-20 PROCEDURE — 2709999900 HC NON-CHARGEABLE SUPPLY

## 2020-12-20 PROCEDURE — 84155 ASSAY OF PROTEIN SERUM: CPT

## 2020-12-20 PROCEDURE — 6360000002 HC RX W HCPCS: Performed by: INTERNAL MEDICINE

## 2020-12-20 PROCEDURE — 86334 IMMUNOFIX E-PHORESIS SERUM: CPT

## 2020-12-20 RX ORDER — HEPARIN SODIUM 5000 [USP'U]/ML
INJECTION, SOLUTION INTRAVENOUS; SUBCUTANEOUS
Status: COMPLETED | OUTPATIENT
Start: 2020-12-20 | End: 2020-12-20

## 2020-12-20 RX ORDER — METOPROLOL TARTRATE 5 MG/5ML
5 INJECTION INTRAVENOUS ONCE
Status: COMPLETED | OUTPATIENT
Start: 2020-12-21 | End: 2020-12-21

## 2020-12-20 RX ORDER — HEPARIN SODIUM 1000 [USP'U]/ML
1500 INJECTION, SOLUTION INTRAVENOUS; SUBCUTANEOUS PRN
Status: DISCONTINUED | OUTPATIENT
Start: 2020-12-20 | End: 2020-12-21

## 2020-12-20 RX ORDER — HEPARIN SODIUM 1000 [USP'U]/ML
1400 INJECTION, SOLUTION INTRAVENOUS; SUBCUTANEOUS PRN
Status: DISCONTINUED | OUTPATIENT
Start: 2020-12-20 | End: 2020-12-21

## 2020-12-20 RX ORDER — TAMSULOSIN HYDROCHLORIDE 0.4 MG/1
0.4 CAPSULE ORAL DAILY
Status: DISCONTINUED | OUTPATIENT
Start: 2020-12-20 | End: 2020-12-23 | Stop reason: HOSPADM

## 2020-12-20 RX ADMIN — TAMSULOSIN HYDROCHLORIDE 0.4 MG: 0.4 CAPSULE ORAL at 09:41

## 2020-12-20 RX ADMIN — HEPARIN SODIUM 1400 UNITS: 1000 INJECTION INTRAVENOUS; SUBCUTANEOUS at 15:30

## 2020-12-20 RX ADMIN — CEFTRIAXONE SODIUM 1 G: 1 INJECTION, POWDER, FOR SOLUTION INTRAMUSCULAR; INTRAVENOUS at 09:41

## 2020-12-20 RX ADMIN — Medication: at 00:57

## 2020-12-20 RX ADMIN — HEPARIN SODIUM 1.5 ML: 5000 INJECTION INTRAVENOUS; SUBCUTANEOUS at 12:18

## 2020-12-20 RX ADMIN — HEPARIN SODIUM 1500 UNITS: 1000 INJECTION INTRAVENOUS; SUBCUTANEOUS at 15:30

## 2020-12-20 RX ADMIN — HEPARIN SODIUM 1.4 ML: 5000 INJECTION INTRAVENOUS; SUBCUTANEOUS at 12:18

## 2020-12-20 RX ADMIN — SODIUM CHLORIDE: 9 INJECTION, SOLUTION INTRAVENOUS at 22:08

## 2020-12-20 ASSESSMENT — PAIN SCALES - GENERAL
PAINLEVEL_OUTOF10: 0

## 2020-12-20 NOTE — CARE COORDINATION
Case Management Initial Discharge Plan  Sheng Aguirre,             Met with:patient to discuss discharge plans. Information verified: address, contacts, phone number, , insurance Yes    Emergency Contact/Next of Kin name & number: Spouse Jannette (phone verified on face sheet)    PCP: No primary care provider on file. Date of last visit: will need list    Insurance Provider: [employer]-shared cost program \"will provide card\"    Discharge Planning    Living Arrangements:    lives with spouse  Support Systems:       Home has 1 stories (rents)  3 stairs to climb to get into front door, no stairs to climb to reach second floor  Location of bedroom/bathroom in home main    Patient able to perform ADL's:Independent    Current Services (outpatient & in home) DME  DME equipment: none  DME provider: none    Receiving oral anticoagulation therapy? No    If indicated:   Physician managing anticoagulation treatment: na  Where does patient obtain lab work for ATC treatment? na      Potential Assistance Needed:       Patient agreeable to home care: No  [place] of choice provided:  declines at this time    Prior SNF/Rehab Placement and Facility: none  Agreeable to SNF/Rehab: No  [place] of choice provided: n/a     Evaluation: n/a    Expected Discharge date:       Patient expects to be discharged to: Follow Up Appointment: Best Day/ Time:      Transportation provider: spouse  Transportation arrangements needed for discharge: No    Readmission Risk              Risk of Unplanned Readmission:        10             Does patient have a readmission risk score greater than 14?: No  If yes, follow-up appointment must be made within 7 days of discharge.      Goals of Care: self care      Discharge Plan: Goal is home with Spouse, declines HC, will need pcp list, plan is Dialysis cath placement and HD today, bicarb drip    Urology recc casas for several weeks, on rocephin Electronically signed by Kennis [place], RN on 12/20/20 at 3:06 PM EST

## 2020-12-20 NOTE — CONSULTS
José Miguel Fortune, Gabriel galvan, [facility], [address] [facility], & [facility]  Urology Consult    Patient:  Divine Baca  MRN: [id number]  YOB: 1960    CHIEF COMPLAINT:  Urinary retention    HISTORY OF PRESENT ILLNESS:   The patient is a 56 y.o. male transferred from [place] with urinary retention. He had a Trevino catheter placed for 8L. Patient has no urologic or medical history. No history of stroke, nerve damage, back surgery, diabetes, or urinary retention. He has never had gross hematuria before. Patient's old records, notes and chart reviewed and summarized above. Past Medical History:    No past medical history on file. Past Surgical History:    Past Surgical History:   Procedure Laterality Date    CHOLECYSTECTOMY         Medications:    Scheduled Meds:   sodium chloride flush  10 mL Intravenous 2 times per day     Continuous Infusions:   sodium bicarbonate infusion 150 mL/hr at 12/20/20 0057    sodium chloride 75 mL/hr at 12/19/20 1808     PRN Meds:.sodium chloride flush, nicotine, promethazine **OR** ondansetron, acetaminophen **OR** acetaminophen    Allergies:  Patient has no known allergies.     Social History:    Social History     Socioeconomic History    Marital status:      Spouse name: Not on file    Number of children: Not on file    Years of education: Not on file    Highest education level: Not on file   Occupational History    Not on file   Social Needs    Financial resource strain: Not on file    Food insecurity     Worry: Not on file     Inability: Not on file    Transportation needs     Medical: Not on file     Non-medical: Not on file   Tobacco Use    Smoking status: Never Smoker    Smokeless tobacco: Never Used   Substance and Sexual Activity    Alcohol use: Not on file    Drug use: Not on file    Sexual activity: Not on file   Lifestyle    Physical activity     Days per week: Not on file     Minutes per session: Not on file    Stress: Not on file Relationships    Social connections     Talks on phone: Not on file     Gets together: Not on file     Attends Spiritism service: Not on file     Active member of club or organization: Not on file     Attends meetings of clubs or organizations: Not on file     Relationship status: Not on file    Intimate partner violence     Fear of current or ex partner: Not on file     Emotionally abused: Not on file     Physically abused: Not on file     Forced sexual activity: Not on file   Other Topics Concern    Not on file   Social History Narrative    Not on file       Family History:  No family history on file. REVIEW OF SYSTEMS:    Constitutional: negative  Eyes: negative  Respiratory: negative  Cardiovascular: negative  Gastrointestinal: negative  Genitourinary: see HPI  Musculoskeletal: negative  Skin: negative   Neurological: negative  Hematological/Lymphatic: negative  Psychological: negative    Physical Exam:    This a 56 y.o. male   Patient Vitals for the past 24 hrs:   BP Temp Temp src Pulse Resp SpO2 Weight   12/20/20 0600    95 22 91 %    12/20/20 0500    94 21 90 % 250 lb (113.4 kg)   12/20/20 0400 (!) 157/102 99.1 °F (37.3 °C) Axillary 94 22 93 %    12/20/20 0300    84  90 %    12/20/20 0200      90 %    12/20/20 0000 113/78 99.9 °F (37.7 °C) Oral 80 24 90 %    12/19/20 2300    81 25 90 %    12/19/20 2200    82 24 91 %    12/19/20 2100    80 24 95 %    12/19/20 2000 109/81 98.2 °F (36.8 °C) Oral 74 25 92 %    12/19/20 1900    74 25 (!) 89 %    12/19/20 1736 (!) 140/99 (!) 46.8 °F (8.2 °C) Oral 78 17 96 % 250 lb 14.1 oz (113.8 kg)       Constitutional: Patient in no acute distress; Neuro: alert and oriented to person place and time. Psych: Mood and affect normal.  Skin: Normal  Lungs: Respiratory effort normal  Cardiovascular:  RRR. Normal peripheral pulses  Abdomen: Soft, non-tender, non-distended  No CVA tenderness  Bladder non-tender and not distended.

## 2020-12-20 NOTE — PROGRESS NOTES
Dialysis Post Treatment Note  Vitals:    12/20/20 1519   BP: (!) 130/90   Pulse: 105   Resp: 20   Temp: 98.7 °F (37.1 °C)   SpO2: 98%     Pre-Weight = 111.5KG  Post-weight = Weight: 246 lb 0.5 oz (111.6 kg)  Total Liters Processed = Total Liters Processed (l/min): 30.9 l/min  Rinseback Volume (mL) = Rinseback Volume (ml): 300 ml  Net Removal (mL) = NET Removed (ml): 0 ml  Type of access used= Temp cath  Length of treatment=2 hours    Pt tolerated tx well, no complications noted

## 2020-12-20 NOTE — PROGRESS NOTES
[facility]  Office: [address], DO, Roxana Mo, DO, Mohan Mensah, DO, Armen Acosta Blood, DO, José Luis Luna MD, Shira Smith MD, Abbey Clements MD, Dean Ann MD, Clifton Vivas MD, Lisy Newell MD, Tasia Chan MD, Aracelis Kinney MD, Paul Eid MD, Mansi Rich DO, Ashleigh Kaplan MD, Darlin Weaver MD, Navarro Grier DO, Acacia Jade MD,  Nathanial Apgar, DO, Bridgett Najera MD, Parish Dia MD, Alee Watson, [facility], [facility], CNP, Jovita Harrington, CNP, Juliet Landers, CNS, Latanya Fret, CNP, Toni Bhatia, CNP, Javon Leonard, CNP, Crystal Pedersen, CNP, Gera Valero, CNP, Nay Contreras PA-C, Parag Méndez, [facility], Thomas Limon, CNP, Morgan Mendoza, CNP, Antonio Naranjo, CNP, Reji Moncada, CNP, Annamarie Shipley, [address] [place] [address]    Progress Note    2020    10:45 AM    Name:   Amina Wahl  MRN:     [id number]     Acct:      [de-identified]   Room:   [facility] Day:  1  Admit Date:  2020  5:24 PM    PCP:   No primary care provider on file.   Code Status:  Full Code    Subjective:     C/C: flank pain     Interval History Status:      Creatinine worsened this AM  Discussed with nephrology  Plans for HD  No complaints per patient     Brief History: Family History: No family history on file. Vitals:  BP (!) 141/94   Pulse 95   Temp 98.4 °F (36.9 °C) (Oral)   Resp 23   Wt 250 lb (113.4 kg)   SpO2 93%   Temp (24hrs), Av.5 °F (31.4 °C), Min:46.8 °F (8.2 °C), Max:99.9 °F (37.7 °C)    Recent Labs     20   POCGLU 167*       I/O (24Hr):     Intake/Output Summary (Last 24 hours) at 2020 1045  Last data filed at 2020 0600  Gross per 24 hour   Intake 1236 ml   Output 1675 ml   Net -439 ml       Labs:  Hematology:  Recent Labs     20  0620  0607   WBC 13.7* 18.3*   RBC 4.12* 3.68*   HGB 12.1* 10.8*   HCT 37.0* 33.2*   MCV 89.8 90.2   MCH 29.4 29.3   MCHC 32.7 32.5   RDW 12.5 13.0    322   MPV 9.3 9.9   INR  --  1.0     Chemistry:  Recent Labs     20  0630 20  2227 20  0607    133* 133*   K 5.0 5.7* 5.1   CL 99 102 98   CO2 17* 17* 20   GLUCOSE 160* 171* 137*   BUN >336* 126* 136*   CREATININE 7.88* 8.46* 8.28*   MG  --   --  1.8   ANIONGAP 20* 14 15   LABGLOM 7* 6* 7*   GFRAA 8* 8* 8*   CALCIUM 10.0 9.4 8.9   LACTACIDWB NOT REPORTED  --   --      Recent Labs     20  0630 20  0607   PROT 8.6*  --  6.0*   LABALBU 4.3  --  3.1*   AST 10  --  7   ALT 14  --  10   ALKPHOS 94  --  73   BILITOT 0.33  --  0.37   LIPASE 110*  --   --    POCGLU  --  167*  --      ABG:  Lab Results   Component Value Date    PHART 7.384 2020    WBI2JFZ 30.0 2020    PO2ART 70.5 2020    TXD2ISW 17.5 2020    NBEA 6.1 2020    PBEA NOT REPORTED 2020    G2PRSSKW 94.2 2020    FIO2 21 2020     No results found for: SPECIAL  No results found for: CULTURE    Radiology:  Ct Abdomen Pelvis Wo Contrast    Addendum Date: 2020 ADDENDUM: Noted within the body of the report there is a 2 mm left lower lobe nodule likely requiring no specific follow-up. Please refer to guidelines below. Guidelines for follow-up and management of pulmonary nodules found on abdomen CT: <6 mm - No follow up recommend on the basis of the estimated low risk of malignancy. 6-8-mm - recommend follow-up chest CT after an appropriate interval (3-12 months depending on clinical risk). >8mm - immediate chest CT for further evaluation. Radiology 2017 http://pubs. rsna.org/doi/full/10.1148/radiol. 0521115707     Result Date: 12/19/2020  Severe bilateral hydronephrosis. Markedly distended urinary bladder. No obstructing renal calculus is visualized. Stranding and fluid surrounding the right kidney. Findings could be related to back flow from severe chronic distention of the urinary bladder. Infection is not excluded on the basis of this examination. Urology consultation should be considered.        Physical Examination:        General appearance:  alert, cooperative and no distress  Mental Status:  oriented to person, place and time and normal affect  Lungs:  clear to auscultation bilaterally, normal effort  Heart:  regular rate and rhythm  Abdomen:  soft, nontender, nondistended, normal bowel sounds  Extremities:  no edema, redness, tenderness in the calves  Skin:  no gross lesions, rashes, induration    Assessment:        Hospital Problems           Last Modified POA    * (Principal) Acute kidney injury ([address] 12/19/2020 Yes    Obstruction of kidney 12/19/2020 Yes    Bilateral hydronephrosis 12/19/2020 Yes    Bladder obstruction 12/19/2020 Yes    Leg edema, right 12/19/2020 Yes          Plan:        - Vitals, labs, imaging, medications reviewed  - Monitor creatinine - worse this AM  - Nephrology consulted - plans to start HD today  - Urology consulted - maintain casas  - Continue to monitor renal function  - Monitor urine output  - Further HD per nephrology

## 2020-12-20 NOTE — PROGRESS NOTES
Nephrology Progress Note        Subjective: Patient evaluated on dialysis. Patient is stable on dialysis. Access the temporary dialysis catheter used without problems. Patient is getting dialysis #1 in the setting of acute kidney injury in the setting of functional urinary tract obstruction. Objective:  CURRENT TEMPERATURE:  Temp: 98.6 °F (37 °C)  MAXIMUM TEMPERATURE OVER 24HRS:  Temp (24hrs), Av.3 °F (32.9 °C), Min:46.8 °F (8.2 °C), Max:99.9 °F (37.7 °C)    CURRENT RESPIRATORY RATE:  Resp: 20  CURRENT PULSE:  Pulse: 102  CURRENT BLOOD PRESSURE:  BP: 136/89  24HR BLOOD PRESSURE RANGE:  Systolic (65OMY), TW , Min:109 , GHT:469   ; Diastolic (97OCT), VJI:17, Min:78, Max:119    24HR INTAKE/OUTPUT:      Intake/Output Summary (Last 24 hours) at 2020 1515  Last data filed at 2020 0600  Gross per 24 hour   Intake 1236 ml   Output 1675 ml   Net -439 ml     Patient Vitals for the past 96 hrs (Last 3 readings):   Weight   20 1243 245 lb 13 oz (111.5 kg)   20 0500 250 lb (113.4 kg)   20 1736 250 lb 14.1 oz (113.8 kg)         Physical Exam:  See initial nephrology consultation today for physical examination, currently unchanged.     Access:  Temporary dialysis catheter    Current Medications:        tamsulosin (FLOMAX) capsule 0.4 mg, Daily      cefTRIAXone (ROCEPHIN) 1 g IVPB in 50 mL D5W minibag, Q24H      heparin (porcine) injection 1,400 Units, PRN      heparin (porcine) injection 1,500 Units, PRN      0.9 % sodium chloride infusion, Continuous      sodium chloride flush 0.9 % injection 10 mL, 2 times per day      sodium chloride flush 0.9 % injection 10 mL, PRN      nicotine (NICODERM CQ) 21 MG/24HR 1 patch, Daily PRN      promethazine (PHENERGAN) tablet 12.5 mg, Q6H PRN    Or      ondansetron (ZOFRAN) injection 4 mg, Q6H PRN      acetaminophen (TYLENOL) tablet 650 mg, Q6H PRN    Or      acetaminophen (TYLENOL) suppository 650 mg, Q6H PRN      Labs:   CBC: Recent Labs     12/19/20  0630 12/20/20  0607   WBC 13.7* 18.3*   RBC 4.12* 3.68*   HGB 12.1* 10.8*   HCT 37.0* 33.2*    322   MPV 9.3 9.9      BMP:   Recent Labs     12/19/20  0630 12/19/20  2227 12/20/20  0607    133* 133*   K 5.0 5.7* 5.1   CL 99 102 98   CO2 17* 17* 20   BUN >336* 126* 136*   CREATININE 7.88* 8.46* 8.28*   GLUCOSE 160* 171* 137*   CALCIUM 10.0 9.4 8.9        Phosphorus:  No results for input(s): PHOS in the last 72 hours. Magnesium:   Recent Labs     12/20/20  0607   MG 1.8     Albumin:   Recent Labs     12/19/20  0630 12/20/20  0607   LABALBU 4.3 3.1*       Dialysis bath: Dialysis Bath  K+ (Potassium): 2  Ca+ (Calcium): 2.25  Na+ (Sodium): 138  HCO3 (Bicarb): 35    Radiology:  Reviewed as available. Assessment:  1 Dialysis dependent acute kidney injury, dialysis bath reviewed with nurse. Plan:  1. Weight removal and dialysis orders reviewed. 2. Evaluate patient tomorrow for potential dialysis #2 tomorrow  3. Follow up labs ordered. Please do not hesitate to call with questions.     Electronically signed by Abilio Leegr MD on 12/20/2020 at 3:15 PM

## 2020-12-20 NOTE — PROGRESS NOTES
[facility]  Occupational Therapy Not Seen Note    DATE: 2020  Name: Cheryl Lo  : 1960  MRN: [id number]    Patient not available for Occupational Therapy due to:    [x] Testing: Dopplers    [] Hemodialysis    [] Blood Transfusion in Progress    []Refusal by Patient:    [] Surgery/Procedure:    [] Strict Bedrest    [] Sedation    [] Spine Precautions     [] Pt being transferred to palliative care at this time. Spoke with pt/family and OT services to be defered. [] Pt independent with functional mobility and functional tasks.  Pt with no OT acute care needs at this time, will defer OT eval.    [] Other    Next Scheduled Treatment: Ck when completed,     Reji Cardenas OTR/L

## 2020-12-20 NOTE — CONSULTS
Patient's urine output since admission has been 1.7 L. Continues on sodium bicarb drip D5 150 mEq of sodium bicarb and 150 mL an hour. UA showed red color trace glucose negative for bilirubin 4+ protein positive for nitrite and leukocyte esterase is moderate. 5-10 white blood cells too many red blood cells to count 0-2 epithelial cells moderate urine hemoglobin moderate urine leukocyte esterase. Urine sodium 83 total urine protein to 85. Bicarb 17.5. Renal ultrasound pending  No history of recent contrast exposure  No h/o prolonged NSAIDs use in the past,   No h/o nephrolithiasis, No recent skin rashes or arthralgias   No hematuria or pyuria noticed in the recent past.   Doesn't report any reduction in the urine output recently. Non report of any obstructive urinary symptoms (urgency, frequency, weak stream, straining while urination). No h/o recurrent UTIs in the past.  No home medications for review. Urology consulted. Recommend maintaining the Trevino catheter for several weeks. Recommend to continue monitoring creatinine and urine output and electrolytes in the setting of postobstructive diuresis. Urine culture sent pending 12/19/2020. Continues on Rocephin. Past History/Allergies? Social History:   No past medical history on file.     No Known Allergies    Social History     Socioeconomic History    Marital status:      Spouse name: Not on file    Number of children: Not on file    Years of education: Not on file    Highest education level: Not on file   Occupational History    Not on file   Social Needs    Financial resource strain: Not on file    Food insecurity     Worry: Not on file     Inability: Not on file    Transportation needs     Medical: Not on file     Non-medical: Not on file   Tobacco Use    Smoking status: Never Smoker    Smokeless tobacco: Never Used   Substance and Sexual Activity    Alcohol use: Not on file    Drug use: Not on file  Sexual activity: Not on file   Lifestyle    Physical activity     Days per week: Not on file     Minutes per session: Not on file    Stress: Not on file   Relationships    Social connections     Talks on phone: Not on file     Gets together: Not on file     Attends Mormonism service: Not on file     Active member of club or organization: Not on file     Attends meetings of clubs or organizations: Not on file     Relationship status: Not on file    Intimate partner violence     Fear of current or ex partner: Not on file     Emotionally abused: Not on file     Physically abused: Not on file     Forced sexual activity: Not on file   Other Topics Concern    Not on file   Social History Narrative    Not on file       Family History:      No family history on file. Outpatient Medications:     No medications prior to admission. Current Medications:     Scheduled Meds:    tamsulosin  0.4 mg Oral Daily    cefTRIAXone (ROCEPHIN) IV  1 g Intravenous Q24H    sodium chloride flush  10 mL Intravenous 2 times per day     Continuous Infusions:    sodium bicarbonate infusion 150 mL/hr at 12/20/20 0057    sodium chloride 75 mL/hr at 12/19/20 1808     PRN Meds:  sodium chloride flush, nicotine, promethazine **OR** ondansetron, acetaminophen **OR** acetaminophen    Review of Systems:     Constitutional: No fever, no chills, no lethargy, no weakness. HEENT:  No headache, otalgia, itchy eyes, nasal discharge or sore throat. Cardiac:  No chest pain, dyspnea, orthopnea or PND. Chest:   No cough, phlegm or wheezing. Abdomen:  No abdominal pain, nausea or vomiting.+ right flank pain and decreased output. Neuro:  No focal weakness, abnormal movements or seizure like activity. Skin:   No rashes, no itching. :   No hematuria, no pyuria, no dysuria, no flank pain. Extremities:  Positive bilateral lower extremity swelling and redness of the right lower leg. No joint pains. ROS was otherwise negative except as mentioned in the [address] Input/Output:       I/O last 3 completed shifts: In: 1236 [I.V.:1236]  Out: 1675 [Urine:1675]    Vital Signs:   Temperature:  Temp: 98.4 °F (36.9 °C)  TMax:   Temp (24hrs), Av.5 °F (31.4 °C), Min:46.8 °F (8.2 °C), Max:99.9 °F (37.7 °C)    Respirations:  Resp: 23  Pulse:   Pulse: 95  BP:    BP: (!) 141/94  BP Range: Systolic (70SQM), NDL:798 , Min:109 , LP       Diastolic (83MPU), XLA:474, Min:78, Max:130      Physical Examination:     General:  AAO x 3, speaking in full sentences, no accessory muscle use. HEENT: Atraumatic, normocephalic, no throat congestion, moist mucosa. Eyes:   Pupils equal, round and reactive to light, EOMI. Neck:   Supple  Chest:   Bilateral vesicular breath sounds, no rales or wheezes. Cardiac:  S1 S2 RR, no murmurs, gallops or rubs, JVP not raised. Abdomen: Soft, non-tender, non distended, BS audible. :   No suprapubic + positive right flank pain. Trevino draining translucent pink urine  Neuro:  AAO x 3, No FND. SKIN:  No rashes, good skin turgor. Extremities:  Bilateral 1+ pitting edema, No clubbing, No cyanosis. Mild swelling and redness of the anterior portion of the left lower leg.     Labs:       Recent Labs     20  0630 20  0607   WBC 13.7* 18.3*   RBC 4.12* 3.68*   HGB 12.1* 10.8*   HCT 37.0* 33.2*   MCV 89.8 90.2   MCH 29.4 29.3   MCHC 32.7 32.5   RDW 12.5 13.0    322   MPV 9.3 9.9      BMP:   Recent Labs     20  0630 20  2227 20  0607    133* 133*   K 5.0 5.7* 5.1   CL 99 102 98   CO2 17* 17* 20   BUN >336* 126* 136*   CREATININE 7.88* 8.46* 8.28*   GLUCOSE 160* 171* 137*   CALCIUM 10.0 9.4 8.9      Magnesium:    Recent Labs     20  0607   MG 1.8     Albumin:    Recent Labs     20  0630 20  0607   LABALBU 4.3 3.1*     SPEP:  Lab Results   Component Value Date    PROT 6.0 2020     Urinalysis/Chemistries:      Lab Results Component Value Date    NITRU POSITIVE 12/19/2020    COLORU RED 12/19/2020    PHUR 7.5 12/19/2020    WBCUA 5 TO 10 12/19/2020    RBCUA TOO NUMEROUS TO COUNT 12/19/2020    MUCUS NOT REPORTED 12/19/2020    TRICHOMONAS NOT REPORTED 12/19/2020    YEAST NOT REPORTED 12/19/2020    BACTERIA NOT REPORTED 12/19/2020    SPECGRAV 1.015 12/19/2020    LEUKOCYTESUR MODERATE 12/19/2020    UROBILINOGEN Normal 12/19/2020    BILIRUBINUR NEGATIVE  Verified by ictotest. 12/19/2020    GLUCOSEU TRACE 12/19/2020    KETUA NEGATIVE 12/19/2020    AMORPHOUS NOT REPORTED 12/19/2020     Urine Sodium:     Lab Results   Component Value Date    TAZ 83 12/19/2020       Radiology:     CXR: Reviewed as available    Assessment:     1. Acute Kidney Injury: Nonoliguric. Likely secondary to prerenal azotemia and postrenal obstructive uropathy. Creatinine on admission 7.88. Prior creatinine normal.  No previous medical records. Patient will need a temporary dialysis catheter placedin urgent dialysis today. 2.  Hyperkalemia secondary to NICOLE and low flow. 5.1 was last measured value. Will treat with acute hemodialysis. 3. Urinary retention -no past medical history. Urology following. 4.  Bilateral hydronephrosis secondary to bladder distention -Trevino placed. 5.  Gross hematuria - urology following. Plan:   1. Patient needs dialysis catheter placement. Recommend IR consultation. 2.  Patient will need hemodialysis today. Will discuss with the patient. 3.  Renal ultrasound pending  4. Comprehensive urine testing including Urinalysis, Urine protein and creatinine to quantify the proteinuria if any at all. Will check urinary eosinophils as well. 5. Will Order serum and urine protein electrophoresis to r/o element to occult paraprotein disease. 6. Will order Hepatitis B and C, YARELIS, Complement levels. 7.  Strict I's and O's and daily weights to be recorded in chart  8. Labs  9. Continue bicarb drip  10.   Following    Nutrition Please ensure that patient is on a renal diet/TF. Avoid nephrotoxic drugs/contrast exposure. Thank you for the consultation. Please do not hesitate to contact us for any further questions/concerns. We will continue to follow along with you. Electronically signed by Roxy Haynes CNP, APRN - CNP on 12/20/2020 at 9:55 AM .  [facility]    Attending Physician Statement  I have discussed the care of Sheng Aguirre, including pertinent history and exam findings with the CNP. I have reviewed the key elements of all parts of the encounter with the CNP. I have seen and examined the patient with the CNP. I agree with the assessment and plan and status of the problem list as documented. Addiitionally I recommend hemodialysis orders as entered by me personally into the patient record. The patient will be evaluated daily for dialysis needs. Once the patient is initiated on dialysis we will stop the bicarbonate containing IV fluids and begin normal saline at 75 ml/hr. The patient may require dialysis long-term.     Omar Leahy MD  Nephrology Attending Physician  [facility]

## 2020-12-21 PROBLEM — I10 ESSENTIAL HYPERTENSION: Status: ACTIVE | Noted: 2020-12-21

## 2020-12-21 PROBLEM — I82.452 ACUTE DEEP VEIN THROMBOSIS (DVT) OF LEFT PERONEAL VEIN (HCC): Status: ACTIVE | Noted: 2020-12-21

## 2020-12-21 LAB
ANION GAP SERPL CALCULATED.3IONS-SCNC: 13 MMOL/L (ref 9–17)
ANION GAP SERPL CALCULATED.3IONS-SCNC: 17 MMOL/L (ref 9–17)
ANTI-NUCLEAR ANTIBODY (ANA): NEGATIVE
BUN BLDV-MCNC: 58 MG/DL (ref 8–23)
BUN BLDV-MCNC: 80 MG/DL (ref 8–23)
BUN/CREAT BLD: ABNORMAL (ref 9–20)
BUN/CREAT BLD: ABNORMAL (ref 9–20)
CALCIUM SERPL-MCNC: 8.3 MG/DL (ref 8.6–10.4)
CALCIUM SERPL-MCNC: 8.7 MG/DL (ref 8.6–10.4)
CHLORIDE BLD-SCNC: 102 MMOL/L (ref 98–107)
CHLORIDE BLD-SCNC: 104 MMOL/L (ref 98–107)
CO2: 21 MMOL/L (ref 20–31)
CO2: 22 MMOL/L (ref 20–31)
CREAT SERPL-MCNC: 3.86 MG/DL (ref 0.7–1.2)
CREAT SERPL-MCNC: 5.55 MG/DL (ref 0.7–1.2)
CULTURE: NO GROWTH
GFR AFRICAN AMERICAN: 13 ML/MIN
GFR AFRICAN AMERICAN: 19 ML/MIN
GFR NON-AFRICAN AMERICAN: 10 ML/MIN
GFR NON-AFRICAN AMERICAN: 16 ML/MIN
GFR SERPL CREATININE-BSD FRML MDRD: ABNORMAL ML/MIN/{1.73_M2}
GLUCOSE BLD-MCNC: 108 MG/DL (ref 70–99)
GLUCOSE BLD-MCNC: 98 MG/DL (ref 70–99)
HCT VFR BLD CALC: 34 % (ref 40.7–50.3)
HEMOGLOBIN: 10.9 G/DL (ref 13–17)
LACTIC ACID, SEPSIS WHOLE BLOOD: 1 MMOL/L (ref 0.5–1.9)
LACTIC ACID, SEPSIS WHOLE BLOOD: 2.1 MMOL/L (ref 0.5–1.9)
LACTIC ACID, SEPSIS: ABNORMAL MMOL/L (ref 0.5–1.9)
LACTIC ACID, SEPSIS: NORMAL MMOL/L (ref 0.5–1.9)
Lab: NORMAL
MCH RBC QN AUTO: 29.7 PG (ref 25.2–33.5)
MCHC RBC AUTO-ENTMCNC: 32.1 G/DL (ref 28.4–34.8)
MCV RBC AUTO: 92.6 FL (ref 82.6–102.9)
NRBC AUTOMATED: 0 PER 100 WBC
PDW BLD-RTO: 12.8 % (ref 11.8–14.4)
PLATELET # BLD: 307 K/UL (ref 138–453)
PMV BLD AUTO: 10.2 FL (ref 8.1–13.5)
POTASSIUM SERPL-SCNC: 3.5 MMOL/L (ref 3.7–5.3)
POTASSIUM SERPL-SCNC: 4 MMOL/L (ref 3.7–5.3)
RBC # BLD: 3.67 M/UL (ref 4.21–5.77)
SODIUM BLD-SCNC: 139 MMOL/L (ref 135–144)
SODIUM BLD-SCNC: 140 MMOL/L (ref 135–144)
SPECIMEN DESCRIPTION: NORMAL
WBC # BLD: 18.1 K/UL (ref 3.5–11.3)

## 2020-12-21 PROCEDURE — 99232 SBSQ HOSP IP/OBS MODERATE 35: CPT | Performed by: INTERNAL MEDICINE

## 2020-12-21 PROCEDURE — 2060000000 HC ICU INTERMEDIATE R&B

## 2020-12-21 PROCEDURE — 6370000000 HC RX 637 (ALT 250 FOR IP): Performed by: STUDENT IN AN ORGANIZED HEALTH CARE EDUCATION/TRAINING PROGRAM

## 2020-12-21 PROCEDURE — 51702 INSERT TEMP BLADDER CATH: CPT

## 2020-12-21 PROCEDURE — 6360000002 HC RX W HCPCS: Performed by: INTERNAL MEDICINE

## 2020-12-21 PROCEDURE — 83605 ASSAY OF LACTIC ACID: CPT

## 2020-12-21 PROCEDURE — 90935 HEMODIALYSIS ONE EVALUATION: CPT

## 2020-12-21 PROCEDURE — 87040 BLOOD CULTURE FOR BACTERIA: CPT

## 2020-12-21 PROCEDURE — 2500000003 HC RX 250 WO HCPCS: Performed by: NURSE PRACTITIONER

## 2020-12-21 PROCEDURE — 85027 COMPLETE CBC AUTOMATED: CPT

## 2020-12-21 PROCEDURE — 6360000002 HC RX W HCPCS: Performed by: STUDENT IN AN ORGANIZED HEALTH CARE EDUCATION/TRAINING PROGRAM

## 2020-12-21 PROCEDURE — 93970 EXTREMITY STUDY: CPT

## 2020-12-21 PROCEDURE — 2580000003 HC RX 258: Performed by: INTERNAL MEDICINE

## 2020-12-21 PROCEDURE — 6370000000 HC RX 637 (ALT 250 FOR IP): Performed by: NURSE PRACTITIONER

## 2020-12-21 PROCEDURE — 36415 COLL VENOUS BLD VENIPUNCTURE: CPT

## 2020-12-21 PROCEDURE — 94761 N-INVAS EAR/PLS OXIMETRY MLT: CPT

## 2020-12-21 PROCEDURE — 99232 SBSQ HOSP IP/OBS MODERATE 35: CPT | Performed by: NURSE PRACTITIONER

## 2020-12-21 PROCEDURE — 2580000003 HC RX 258: Performed by: STUDENT IN AN ORGANIZED HEALTH CARE EDUCATION/TRAINING PROGRAM

## 2020-12-21 PROCEDURE — 80048 BASIC METABOLIC PNL TOTAL CA: CPT

## 2020-12-21 RX ORDER — AMLODIPINE BESYLATE 10 MG/1
10 TABLET ORAL DAILY
Status: DISCONTINUED | OUTPATIENT
Start: 2020-12-21 | End: 2020-12-23 | Stop reason: HOSPADM

## 2020-12-21 RX ORDER — FAMOTIDINE 20 MG/1
10 TABLET, FILM COATED ORAL DAILY
Status: DISCONTINUED | OUTPATIENT
Start: 2020-12-21 | End: 2020-12-23 | Stop reason: HOSPADM

## 2020-12-21 RX ORDER — HEPARIN SODIUM 5000 [USP'U]/ML
5000 INJECTION, SOLUTION INTRAVENOUS; SUBCUTANEOUS EVERY 8 HOURS SCHEDULED
Status: DISCONTINUED | OUTPATIENT
Start: 2020-12-21 | End: 2020-12-21

## 2020-12-21 RX ADMIN — APIXABAN 10 MG: 5 TABLET, FILM COATED ORAL at 14:31

## 2020-12-21 RX ADMIN — CEFTRIAXONE SODIUM 1 G: 1 INJECTION, POWDER, FOR SOLUTION INTRAMUSCULAR; INTRAVENOUS at 07:46

## 2020-12-21 RX ADMIN — HEPARIN SODIUM 1500 UNITS: 1000 INJECTION INTRAVENOUS; SUBCUTANEOUS at 12:37

## 2020-12-21 RX ADMIN — TAMSULOSIN HYDROCHLORIDE 0.4 MG: 0.4 CAPSULE ORAL at 07:46

## 2020-12-21 RX ADMIN — METOPROLOL TARTRATE 5 MG: 1 INJECTION, SOLUTION INTRAVENOUS at 07:46

## 2020-12-21 RX ADMIN — Medication 10 ML: at 07:46

## 2020-12-21 RX ADMIN — AMLODIPINE BESYLATE 10 MG: 10 TABLET ORAL at 14:31

## 2020-12-21 RX ADMIN — FAMOTIDINE 10 MG: 20 TABLET, FILM COATED ORAL at 14:31

## 2020-12-21 RX ADMIN — APIXABAN 10 MG: 5 TABLET, FILM COATED ORAL at 20:37

## 2020-12-21 RX ADMIN — HEPARIN SODIUM 1400 UNITS: 1000 INJECTION INTRAVENOUS; SUBCUTANEOUS at 12:37

## 2020-12-21 ASSESSMENT — PAIN SCALES - GENERAL
PAINLEVEL_OUTOF10: 0
PAINLEVEL_OUTOF10: 0

## 2020-12-21 NOTE — PROGRESS NOTES
Charmayne Osgood, MD, Juan Staples MD, Warren Nobles MD, Chris Tavares MD, Ruiz Rojo MD, Yuniel Arizmendi MD, & Bhavesh May MD    Urology - Progress Note      Subjective: No acute events overnight. No documented fevers. Had dialysis yesterday. Tolerating casas catheter. UOP 4950 cc/24h.       Patient Vitals for the past 24 hrs:   BP Temp Temp src Pulse Resp SpO2 Weight   12/21/20 0445    102 28 92 % 255 lb 11.7 oz (116 kg)   12/21/20 0400 121/79 99 °F (37.2 °C) Oral 100 27 92 %    12/21/20 0200 (!) 142/90   106 18 92 %    12/20/20 2300 (!) 151/95 98.8 °F (37.1 °C) Oral 110 18 93 %    12/20/20 2200 138/87 99 °F (37.2 °C) Oral 113 24 90 %    12/20/20 2000 (!) 168/118   113      12/20/20 1615 (!) 154/97 98.2 °F (36.8 °C) Oral 109 21 95 %    12/20/20 1519 (!) 130/90 98.7 °F (37.1 °C)  105 20 98 % 246 lb 0.5 oz (111.6 kg)   12/20/20 1514 (!) 132/93   102      12/20/20 1443 136/89   102      12/20/20 1413 (!) 138/93   106      12/20/20 1343 (!) 146/98   106      12/20/20 1313 (!) 137/92   105      12/20/20 1243 (!) 159/96 98.6 °F (37 °C)  105 20 93 % 245 lb 13 oz (111.5 kg)   12/20/20 1221 (!) 158/100   104 26 99 %    12/20/20 1214 (!) 156/103   104 28 98 %    12/20/20 1208 (!) 162/102   107 24 96 %    12/20/20 1118 (!) 139/92 98.2 °F (36.8 °C) Oral 100 23 95 %    12/20/20 0730 (!) 141/94 98.4 °F (36.9 °C) Oral 95 23 93 %        Intake/Output Summary (Last 24 hours) at 12/21/2020 2745  Last data filed at 12/21/2020 0449  Gross per 24 hour   Intake 3393 ml   Output 5350 ml   Net -1957 ml       Recent Labs     12/19/20  0630 12/20/20  0607   WBC 13.7* 18.3*   HGB 12.1* 10.8*   HCT 37.0* 33.2*   MCV 89.8 90.2    322     Recent Labs     12/19/20  0630 12/19/20  2227 12/20/20  0607    133* 133*   K 5.0 5.7* 5.1   CL 99 102 98   CO2 17* 17* 20   BUN >336* 126* 136*   CREATININE 7.88* 8.46* 8.28*       Recent Labs     12/19/20  2329   COLORU RED* PHUR 7.5   WBCUA 5 TO 10   RBCUA TOO NUMEROUS TO COUNT   MUCUS NOT REPORTED   TRICHOMONAS NOT REPORTED   YEAST NOT REPORTED   BACTERIA NOT REPORTED   SPECGRAV 1.015   LEUKOCYTESUR MODERATE*   UROBILINOGEN Normal   BILIRUBINUR NEGATIVE  Verified by ictotest.*       Additional Lab/culture results: UCx pending     Physical Exam:   NAD, AOx3  NLB, equal chest rise B/L  RRR, 2+ radial pulses  ABD S/ND/NT, no CVAT  Trevino draining clear yellow urine   Warm extremities, no calf tenderness    Interval Imaging Findings: None     Assessment:  Rima Goldman is a 56 y.o. male who presents with:   - Urinary retention, Trevino for 8L  - Bilateral hydroureteronephrosis secondary to bladder distention  - Gross hematuria, resolved  - Acute renal insufficiency    Plan:   Maintain Trevino catheter for the foreseeable future, patient may need to learn CIC as an outpatient  Monitor creatinine and urine output, patient with postobstructive diuresis, appreciate nephrology recommendations for dialysis as needed  Flomax  Urine culture pending, patient currently on Rocephin  No acute  intervention at this time, please call with questions    Leatha Lovelace MD  PGY-4, [address] Department of Urology   6:33 AM 12/21/2020

## 2020-12-21 NOTE — PLAN OF CARE
Problem: Pain:  Goal: Pain level will decrease  Description: Pain level will decrease  12/21/2020 0516 by Patricia Wade RN  Outcome: Ongoing  12/21/2020 0515 by Patricia Wade RN  Outcome: Ongoing  12/21/2020 0514 by Patricia Wade RN  Outcome: Ongoing  Goal: Control of acute pain  Description: Control of acute pain  12/21/2020 0516 by Patricia Wade RN  Outcome: Ongoing  12/21/2020 0515 by Patricia Wade RN  Outcome: Ongoing  12/21/2020 0514 by Patricia Wade RN  Outcome: Ongoing  Goal: Control of chronic pain  Description: Control of chronic pain  12/21/2020 0516 by Patricia Wade RN  Outcome: Ongoing  12/21/2020 0515 by Patricia Wade RN  Outcome: Ongoing  12/21/2020 0514 by Patricia Wade RN  Outcome: Ongoing     Problem: Falls - Risk of:  Goal: Will remain free from falls  Description: Will remain free from falls  12/21/2020 0516 by Patricia Wade RN  Outcome: Ongoing  12/21/2020 0515 by Patricia Wade RN  Outcome: Ongoing  Goal: Absence of physical injury  Description: Absence of physical injury  12/21/2020 0516 by Patricia Wade RN  Outcome: Ongoing  12/21/2020 0515 by Patricia Wade RN  Outcome: Ongoing     Problem: Urinary Elimination:  Goal: Signs and symptoms of infection will decrease  Description: Signs and symptoms of infection will decrease  12/21/2020 0516 by Patricia Wade RN  Outcome: Ongoing  12/21/2020 0515 by Patricia Wade RN  Outcome: Ongoing  Goal: Complications related to the disease process, condition or treatment will be avoided or minimized  Description: Complications related to the disease process, condition or treatment will be avoided or minimized  12/21/2020 0516 by Patricia Wade RN  Outcome: Ongoing  12/21/2020 0515 by Patricia Wade RN  Outcome: Ongoing     Problem: Pain:  Goal: Control of acute pain Description: Control of acute pain  12/21/2020 0516 by Azalia Clements RN  Outcome: Ongoing  12/21/2020 0515 by Azalia Clements RN  Outcome: Ongoing  12/21/2020 0514 by Azalia Clements RN  Outcome: Ongoing     Problem: Falls - Risk of:  Goal: Will remain free from falls  Description: Will remain free from falls  12/21/2020 0516 by Azalia Clements RN  Outcome: Ongoing  12/21/2020 0515 by Azalia Clemnets RN  Outcome: Ongoing     Problem: Urinary Elimination:  Goal: Signs and symptoms of infection will decrease  Description: Signs and symptoms of infection will decrease  12/21/2020 0516 by Azalia Clements RN  Outcome: Ongoing  12/21/2020 0515 by Azalia Clements RN  Outcome: Ongoing

## 2020-12-21 NOTE — PROGRESS NOTES
Physical Therapy  DATE: 2020    NAME: Aleyda Jaime  MRN: [id number]   : 1960    Patient not seen this date for Physical Therapy due to:  [] Blood transfusion in progress  [] Hemodialysis  [] Patient Declined  [] Spine Precautions   [] Strict Bedrest  [] Surgery/ Procedure  [x] Testing: dopplers ordered to r/o DVT. PT will check back 20. [] Other        [] PT is being discontinued at this time. Patient independent. No further needs. [] PT is being discontinued at this time due to declining physical/ medical status. Therapy is not appropriate at this time.     Genie Check, PT

## 2020-12-21 NOTE — PROGRESS NOTES
Occupational [address]  Occupational Therapy Not Seen Note    DATE: 2020  Name: Sheng Aguirre  : 1960  MRN: [id number]    Patient not available for Occupational Therapy due to:    [x] Testing: Awaiting dopplers       Next Scheduled Treatment: Will check back as time allows     Esmer Wren OTR/L

## 2020-12-21 NOTE — FLOWSHEET NOTE
Dialysis Post Treatment Note  Vitals:    12/21/20 1237   BP: (!) 143/88   Pulse: 95   Resp: 18   Temp: 99.3 °F (37.4 °C)   SpO2:      Pre-Weight = 107.7 kg  Post-weight = Weight: 236 lb 12.4 oz (107.4 kg)  Total Liters Processed = Total Liters Processed (l/min): 23.1 l/min  Rinseback Volume (mL) = Rinseback Volume (ml): 300 ml  Net Removal (mL) = NET Removed (ml): 300 ml  Patient's dry weight= not established   Type of access used= Right temp cath  Length of treatment= 70 minutes         Patient tolerated treatment well. All blood returned. Dr. Yamini Samaniego decided to end treatment after evaluating patient. No complications noted.

## 2020-12-21 NOTE — PROGRESS NOTES
NEPHROLOGY PROGRESS NOTE      SUBJECTIVE     Patient was seen on dialysis. Plan was to get second session of hemodialysis. Repeat labs showed creatinine of 5.5. Urine output is picking up nicely. On questioning he denies any chest pain shortness of breath. Appetite is decent. Back pain has disappeared. All notes reviewed. OBJECTIVE     Vitals:    12/21/20 1106 12/21/20 1111 12/21/20 1142 12/21/20 1212   BP: (!) 172/109 (!) 165/100 (!) 168/103 (!) 162/94   Pulse: 102 95 91 90   Resp: 20      Temp: 100.8 °F (38.2 °C)      TempSrc:       SpO2:       Weight: 237 lb 7 oz (107.7 kg)        24HR INTAKE/OUTPUT:      Intake/Output Summary (Last 24 hours) at 12/21/2020 1229  Last data filed at 12/21/2020 0759  Gross per 24 hour   Intake 3598 ml   Output 7150 ml   Net -3552 ml       General appearance:Awake, alert, in no acute distress  HEENT: PERRLA  Respiratory::vesicular breath sounds,no wheeze/crackles  Cardiovascular:S1 S2 normal,no gallop or organic murmur. Abdomen:Non tender/non distended. Bowel sounds present  Extremities: No Cyanosis or Clubbing,Lower extremity edema  Neurological:Alert and oriented. No abnormalities of mood, affect, memory, mentation, or behavior are noted      MEDICATIONS     Scheduled Meds:    tamsulosin  0.4 mg Oral Daily    cefTRIAXone (ROCEPHIN) IV  1 g Intravenous Q24H    sodium chloride flush  10 mL Intravenous 2 times per day     Continuous Infusions:    sodium chloride 75 mL/hr at 12/20/20 2208     PRN Meds:  heparin (porcine), heparin (porcine), sodium chloride flush, nicotine, promethazine **OR** ondansetron, acetaminophen **OR** acetaminophen  Home Meds:                No medications prior to admission.     INVESTIGATIONS     Last 3 CMP:    Recent Labs     12/19/20  0630 12/19/20  2227 12/20/20  0607 12/20/20  1324 12/21/20  0748    133* 133*  --  140   K 5.0 5.7* 5.1  --  4.0   CL 99 102 98  --  102   CO2 17* 17* 20  --  21   BUN >336* 126* 136*  --  80* CREATININE 7.88* 8.46* 8.28*  --  5.55*   CALCIUM 10.0 9.4 8.9  --  8.7   PROT 8.6*  --  6.0* 6.2*  --    LABALBU 4.3  --  3.1*  --   --    BILITOT 0.33  --  0.37  --   --    ALKPHOS 94  --  73  --   --    AST 10  --  7  --   --    ALT 14  --  10  --   --        Last 3 CBC:  Recent Labs     12/19/20  0630 12/20/20  0607 12/21/20  0748   WBC 13.7* 18.3* 18.1*   RBC 4.12* 3.68* 3.67*   HGB 12.1* 10.8* 10.9*   HCT 37.0* 33.2* 34.0*   MCV 89.8 90.2 92.6   MCH 29.4 29.3 29.7   MCHC 32.7 32.5 32.1   RDW 12.5 13.0 12.8    322 307   MPV 9.3 9.9 10.2       ASSESSMENT     1. Acute kidney injury nonoliguric secondary to acute urinary retention -presented with BUN of 36 and creatinine of 7.8. Had 1 session of hemodialysis yesterday. Urine output has picked up. 2.  Acute urine retention status post Trevino placement 8 L urine drained. CT scan showed no evidence of prostate enlargement or bladder tumors  3. Hyperkalemia -resolved  4. History of pulmonary embolism in the past    PLAN     1. Will dc HD as I feel his renal function is improving and UO is excellent  2. BMP Q8 and will continue fluids  3. FU Pending serologies  4. Trevino management as per urology  5. Anticipate renal recovery.       Please do not hesitate to call with questions    This note is created with the assistance of a speech-recognition program. While intending to generate a document that actually reflects the content of the visit, no guarantees can be provided that every mistake has been identified and corrected by editing    Rock Lindsay MD, [facility] Meryle Moeller), [address]   12/21/2020 12:29 PM  [facility]

## 2020-12-22 ENCOUNTER — APPOINTMENT (OUTPATIENT)
Dept: ULTRASOUND IMAGING | Age: 60
DRG: 683 | End: 2020-12-22
Attending: INTERNAL MEDICINE

## 2020-12-22 PROBLEM — N28.89 OBSTRUCTION OF KIDNEY: Status: RESOLVED | Noted: 2020-12-19 | Resolved: 2020-12-22

## 2020-12-22 PROBLEM — N13.30 BILATERAL HYDRONEPHROSIS: Status: RESOLVED | Noted: 2020-12-19 | Resolved: 2020-12-22

## 2020-12-22 LAB
ABSOLUTE EOS #: 0.2 K/UL (ref 0–0.44)
ABSOLUTE IMMATURE GRANULOCYTE: 0.05 K/UL (ref 0–0.3)
ABSOLUTE LYMPH #: 1.15 K/UL (ref 1.1–3.7)
ABSOLUTE MONO #: 0.96 K/UL (ref 0.1–1.2)
ABSOLUTE RETIC #: 0.03 M/UL (ref 0.03–0.08)
ALBUMIN (CALCULATED): 3.2 G/DL (ref 3.2–5.2)
ALBUMIN PERCENT: 51 % (ref 45–65)
ALPHA 1 PERCENT: 3 % (ref 3–6)
ALPHA 2 PERCENT: 12 % (ref 6–13)
ALPHA-1-GLOBULIN: 0.2 G/DL (ref 0.1–0.4)
ALPHA-2-GLOBULIN: 0.7 G/DL (ref 0.5–0.9)
ANION GAP SERPL CALCULATED.3IONS-SCNC: 13 MMOL/L (ref 9–17)
BASOPHILS # BLD: 0 % (ref 0–2)
BASOPHILS ABSOLUTE: 0.03 K/UL (ref 0–0.2)
BETA GLOBULIN: 0.7 G/DL (ref 0.5–1.1)
BETA PERCENT: 11 % (ref 11–19)
BUN BLDV-MCNC: 58 MG/DL (ref 8–23)
BUN/CREAT BLD: ABNORMAL (ref 9–20)
CALCIUM SERPL-MCNC: 8.4 MG/DL (ref 8.6–10.4)
CHLORIDE BLD-SCNC: 103 MMOL/L (ref 98–107)
CO2: 20 MMOL/L (ref 20–31)
CREAT SERPL-MCNC: 3.83 MG/DL (ref 0.7–1.2)
DIFFERENTIAL TYPE: ABNORMAL
EOSINOPHILS RELATIVE PERCENT: 2 % (ref 1–4)
FERRITIN: 451 UG/L (ref 30–400)
FOLATE: 13 NG/ML
GAMMA GLOBULIN %: 23 % (ref 9–20)
GAMMA GLOBULIN: 1.4 G/DL (ref 0.5–1.5)
GFR AFRICAN AMERICAN: 19 ML/MIN
GFR NON-AFRICAN AMERICAN: 16 ML/MIN
GFR SERPL CREATININE-BSD FRML MDRD: ABNORMAL ML/MIN/{1.73_M2}
GFR SERPL CREATININE-BSD FRML MDRD: ABNORMAL ML/MIN/{1.73_M2}
GLUCOSE BLD-MCNC: 74 MG/DL (ref 70–99)
GLUCOSE BLD-MCNC: 97 MG/DL (ref 75–110)
HCT VFR BLD CALC: 33.8 % (ref 40.7–50.3)
HCT VFR BLD CALC: 34 % (ref 40.7–50.3)
HEMOGLOBIN: 10.6 G/DL (ref 13–17)
HEMOGLOBIN: 11.2 G/DL (ref 13–17)
IMMATURE GRANULOCYTES: 0 %
IMMATURE RETIC FRACT: 10.7 % (ref 2.7–18.3)
IRON SATURATION: 20 % (ref 20–55)
IRON: 32 UG/DL (ref 59–158)
LYMPHOCYTES # BLD: 10 % (ref 24–43)
MCH RBC QN AUTO: 29.3 PG (ref 25.2–33.5)
MCH RBC QN AUTO: 30 PG (ref 25.2–33.5)
MCHC RBC AUTO-ENTMCNC: 31.4 G/DL (ref 28.4–34.8)
MCHC RBC AUTO-ENTMCNC: 32.9 G/DL (ref 28.4–34.8)
MCV RBC AUTO: 91.2 FL (ref 82.6–102.9)
MCV RBC AUTO: 93.4 FL (ref 82.6–102.9)
MONOCYTES # BLD: 8 % (ref 3–12)
NRBC AUTOMATED: 0 PER 100 WBC
NRBC AUTOMATED: 0 PER 100 WBC
PATHOLOGIST: ABNORMAL
PATHOLOGIST: NORMAL
PDW BLD-RTO: 12.4 % (ref 11.8–14.4)
PDW BLD-RTO: 12.4 % (ref 11.8–14.4)
PLATELET # BLD: 298 K/UL (ref 138–453)
PLATELET # BLD: 312 K/UL (ref 138–453)
PLATELET ESTIMATE: ABNORMAL
PMV BLD AUTO: 10 FL (ref 8.1–13.5)
PMV BLD AUTO: 10.1 FL (ref 8.1–13.5)
POTASSIUM SERPL-SCNC: 4.1 MMOL/L (ref 3.7–5.3)
PROTEIN ELECTROPHORESIS, SERUM: ABNORMAL
RBC # BLD: 3.62 M/UL (ref 4.21–5.77)
RBC # BLD: 3.73 M/UL (ref 4.21–5.77)
RBC # BLD: ABNORMAL 10*6/UL
RETIC %: 0.9 % (ref 0.5–1.9)
RETIC HEMOGLOBIN: 28.7 PG (ref 28.2–35.7)
SEG NEUTROPHILS: 80 % (ref 36–65)
SEGMENTED NEUTROPHILS ABSOLUTE COUNT: 9.17 K/UL (ref 1.5–8.1)
SERUM IFX INTERP: NORMAL
SODIUM BLD-SCNC: 136 MMOL/L (ref 135–144)
TOTAL IRON BINDING CAPACITY: 159 UG/DL (ref 250–450)
TOTAL PROT. SUM,%: 100 % (ref 98–102)
TOTAL PROT. SUM: 6.2 G/DL (ref 6.3–8.2)
TOTAL PROTEIN: 6.2 G/DL (ref 6.4–8.3)
UNSATURATED IRON BINDING CAPACITY: 127 UG/DL (ref 112–347)
VITAMIN B-12: 452 PG/ML (ref 232–1245)
WBC # BLD: 11.6 K/UL (ref 3.5–11.3)
WBC # BLD: 12.3 K/UL (ref 3.5–11.3)
WBC # BLD: ABNORMAL 10*3/UL

## 2020-12-22 PROCEDURE — 82728 ASSAY OF FERRITIN: CPT

## 2020-12-22 PROCEDURE — 83540 ASSAY OF IRON: CPT

## 2020-12-22 PROCEDURE — 99232 SBSQ HOSP IP/OBS MODERATE 35: CPT | Performed by: INTERNAL MEDICINE

## 2020-12-22 PROCEDURE — 36415 COLL VENOUS BLD VENIPUNCTURE: CPT

## 2020-12-22 PROCEDURE — 97166 OT EVAL MOD COMPLEX 45 MIN: CPT

## 2020-12-22 PROCEDURE — 6360000002 HC RX W HCPCS: Performed by: STUDENT IN AN ORGANIZED HEALTH CARE EDUCATION/TRAINING PROGRAM

## 2020-12-22 PROCEDURE — 85045 AUTOMATED RETICULOCYTE COUNT: CPT

## 2020-12-22 PROCEDURE — 76770 US EXAM ABDO BACK WALL COMP: CPT

## 2020-12-22 PROCEDURE — 2580000003 HC RX 258: Performed by: STUDENT IN AN ORGANIZED HEALTH CARE EDUCATION/TRAINING PROGRAM

## 2020-12-22 PROCEDURE — 82668 ASSAY OF ERYTHROPOIETIN: CPT

## 2020-12-22 PROCEDURE — 97530 THERAPEUTIC ACTIVITIES: CPT

## 2020-12-22 PROCEDURE — 85027 COMPLETE CBC AUTOMATED: CPT

## 2020-12-22 PROCEDURE — 99254 IP/OBS CNSLTJ NEW/EST MOD 60: CPT | Performed by: INTERNAL MEDICINE

## 2020-12-22 PROCEDURE — 85025 COMPLETE CBC W/AUTO DIFF WBC: CPT

## 2020-12-22 PROCEDURE — 80048 BASIC METABOLIC PNL TOTAL CA: CPT

## 2020-12-22 PROCEDURE — 6370000000 HC RX 637 (ALT 250 FOR IP): Performed by: NURSE PRACTITIONER

## 2020-12-22 PROCEDURE — 83550 IRON BINDING TEST: CPT

## 2020-12-22 PROCEDURE — 97162 PT EVAL MOD COMPLEX 30 MIN: CPT

## 2020-12-22 PROCEDURE — 97535 SELF CARE MNGMENT TRAINING: CPT

## 2020-12-22 PROCEDURE — 82607 VITAMIN B-12: CPT

## 2020-12-22 PROCEDURE — 6370000000 HC RX 637 (ALT 250 FOR IP): Performed by: STUDENT IN AN ORGANIZED HEALTH CARE EDUCATION/TRAINING PROGRAM

## 2020-12-22 PROCEDURE — 2580000003 HC RX 258: Performed by: INTERNAL MEDICINE

## 2020-12-22 PROCEDURE — APPSS60 APP SPLIT SHARED TIME 46-60 MINUTES: Performed by: NURSE PRACTITIONER

## 2020-12-22 PROCEDURE — 2060000000 HC ICU INTERMEDIATE R&B

## 2020-12-22 PROCEDURE — 82746 ASSAY OF FOLIC ACID SERUM: CPT

## 2020-12-22 PROCEDURE — 82947 ASSAY GLUCOSE BLOOD QUANT: CPT

## 2020-12-22 RX ADMIN — AMLODIPINE BESYLATE 10 MG: 10 TABLET ORAL at 08:31

## 2020-12-22 RX ADMIN — APIXABAN 10 MG: 5 TABLET, FILM COATED ORAL at 20:15

## 2020-12-22 RX ADMIN — FAMOTIDINE 10 MG: 20 TABLET, FILM COATED ORAL at 08:28

## 2020-12-22 RX ADMIN — CEFTRIAXONE SODIUM 1 G: 1 INJECTION, POWDER, FOR SOLUTION INTRAMUSCULAR; INTRAVENOUS at 08:28

## 2020-12-22 RX ADMIN — SODIUM CHLORIDE: 9 INJECTION, SOLUTION INTRAVENOUS at 23:50

## 2020-12-22 RX ADMIN — Medication 10 ML: at 08:29

## 2020-12-22 RX ADMIN — APIXABAN 10 MG: 5 TABLET, FILM COATED ORAL at 08:28

## 2020-12-22 RX ADMIN — TAMSULOSIN HYDROCHLORIDE 0.4 MG: 0.4 CAPSULE ORAL at 08:28

## 2020-12-22 ASSESSMENT — PAIN DESCRIPTION - ORIENTATION
ORIENTATION: LOWER
ORIENTATION: LOWER

## 2020-12-22 ASSESSMENT — PAIN SCALES - GENERAL
PAINLEVEL_OUTOF10: 6
PAINLEVEL_OUTOF10: 0
PAINLEVEL_OUTOF10: 6

## 2020-12-22 ASSESSMENT — PAIN DESCRIPTION - PAIN TYPE
TYPE: ACUTE PAIN
TYPE: ACUTE PAIN

## 2020-12-22 ASSESSMENT — PAIN DESCRIPTION - DESCRIPTORS
DESCRIPTORS: ACHING;DISCOMFORT;SORE
DESCRIPTORS: ACHING;DISCOMFORT

## 2020-12-22 ASSESSMENT — PAIN - FUNCTIONAL ASSESSMENT: PAIN_FUNCTIONAL_ASSESSMENT: ACTIVITIES ARE NOT PREVENTED

## 2020-12-22 ASSESSMENT — PAIN DESCRIPTION - LOCATION
LOCATION: ABDOMEN
LOCATION: ABDOMEN

## 2020-12-22 NOTE — PROGRESS NOTES
Physical Therapy  DATE: 2020    NAME: Cheryl Lo  MRN: [id number]   : 1960    Patient not seen this date for Physical Therapy due to:  [] Blood transfusion in progress  [] Hemodialysis  [] Patient Declined  [] Spine Precautions   [] Strict Bedrest  [] Surgery/ Procedure  [x] Testing- pt leaving for ultrasound with transport. PT will check back as time allows. [] Other        [] PT is being discontinued at this time. Patient independent. No further needs. [] PT is being discontinued at this time due to declining physical/ medical status. Therapy is not appropriate at this time.     Colby Cuevas, PT

## 2020-12-22 NOTE — PLAN OF CARE
Problem: Pain:  Goal: Control of acute pain  Description: Control of acute pain  Outcome: Ongoing     Problem: Falls - Risk of:  Goal: Will remain free from falls  Description: Will remain free from falls  Outcome: Ongoing     Problem: Urinary Elimination:  Goal: Signs and symptoms of infection will decrease  Description: Signs and symptoms of infection will decrease  Outcome: Ongoing

## 2020-12-22 NOTE — PROGRESS NOTES
LABALBU 3.1*  --   --   --   --    BILITOT 0.37  --   --   --   --    ALKPHOS 73  --   --   --   --    AST 7  --   --   --   --    ALT 10  --   --   --   --        Last 3 CBC:  Recent Labs     12/20/20  0607 12/21/20  0748 12/22/20  0416   WBC 18.3* 18.1* 12.3*   RBC 3.68* 3.67* 3.62*   HGB 10.8* 10.9* 10.6*   HCT 33.2* 34.0* 33.8*   MCV 90.2 92.6 93.4   MCH 29.3 29.7 29.3   MCHC 32.5 32.1 31.4   RDW 13.0 12.8 12.4    307 298   MPV 9.9 10.2 10.1       ASSESSMENT     1. Acute kidney injury nonoliguric secondary to acute urinary retention -presented with BUN of 36 and creatinine of 7.8. Had 2 session of hemodialysis  Urine output has picked up. 2.  Acute urine retention status post Trevino placement 8 L urine drained. CT scan showed no evidence of prostate enlargement or bladder tumors  3. Hyperkalemia -resolved  4. History of pulmonary embolism in the past    PLAN     1. Leave em cath in today. No HD. Continue fluids  2. FU Pending serologies  3. Trevino management as per urology  4. Anticipate renal recovery.   5. Hopefully dc am if renal function stable/improving      Please do not hesitate to call with questions    This note is created with the assistance of a speech-recognition program. While intending to generate a document that actually reflects the content of the visit, no guarantees can be provided that every mistake has been identified and corrected by editing    John Arceo MD, MRCP Kevin Solis), [address]   12/22/2020 2:52 PM  [facility]

## 2020-12-22 NOTE — DISCHARGE INSTR - DIET

## 2020-12-22 NOTE — PROGRESS NOTES
Functional Pain Assessment: Activities are not prevented  Response to Pain Intervention: Patient Satisfied  Pre Treatment Pain Screening  Intervention List: Patient able to continue with treatment  Vital Signs  Patient Currently in Pain: Yes     Social/Functional History  Social/Functional History  Lives With: Spouse(in good health, able to assist as needed)  Type of Home: House  Home Layout: One level  Home Access: Stairs to enter without rails  Entrance Stairs - Number of Steps: 3  Bathroom Shower/Tub: Tub/Shower unit  Bathroom Toilet: Standard  Home Equipment: (no DME use at baseline)  ADL Assistance: [address]: Independent  Homemaking Responsibilities: Yes  Ambulation Assistance: Independent  Transfer Assistance: Independent  Active : Yes  Mode of Transportation: [employer]  Occupation: Full time employment  Type of occupation:   Leisure & Hobbies: pt enjoys reading, eating, bike ride, walk  Additional Comments: Pt reports wife is home majority of the time       Objective   Vision: Impaired  Vision Exceptions: Wears glasses at all times  Hearing: Within functional limits      Balance  Sitting Balance: Supervision  Standing Balance: Stand by assistance  Functional Mobility  Functional - Mobility Device: Rolling Walker  Activity: Other  Assist Level: Stand by assistance  Functional Mobility Comments: Pt completed functional mobility with RW and SBA for safety; no gross LOB or unsteadiness throughout     ADL  Feeding: Independent;Setup  Grooming: Independent;Setup  UE Bathing: Independent;Setup  LE Bathing: Independent;Setup  UE Dressing: Independent;Setup  LE Dressing: Independent;Setup(doffed socks independently  while seated at edge of chair)  Toileting: Setup;Supervision  Tone RUE  RUE Tone: Normotonic  Tone LUE  LUE Tone: Normotonic  Coordination  Movements Are Fluid And Coordinated: Yes     Bed mobility  Supine to Sit: Modified independent  Sit to Supine: Modified independent Scooting: Modified independent  Comment: HOB elevated  Transfers  Sit to stand: Stand by assistance  Stand to sit: Stand by assistance    Cognition  Overall Cognitive Status: WFL    Sensation  Overall Sensation Status: WFL        LUE AROM (degrees)  LUE AROM : WFL  Left Hand AROM (degrees)  Left Hand AROM: WFL  RUE AROM (degrees)  RUE AROM : WFL  Right Hand AROM (degrees)  Right Hand AROM: WFL  LUE Strength  Gross LUE Strength: WFL  L Hand General: 4+/5  RUE Strength  Gross RUE Strength: WFL  R Hand General: 4+/5                   AM-PAC Score        AM-[facility] Inpatient Daily Activity Raw Score: 24 (12/22/20 1544)  AM-PAC Inpatient ADL T-Scale Score : 57.54 (12/22/20 1544)  ADL Inpatient CMS 0-100% Score: 0 (12/22/20 1544)  ADL Inpatient CMS G-Code Modifier : CH (12/22/20 1544)    Therapy Time   Individual Concurrent Group Co-treatment   Time In 1428         Time Out 1454         Minutes 26         Timed Code Treatment Minutes: 12 Minutes       [facility], OTR/L

## 2020-12-22 NOTE — CONSULTS
Today's Date: 12/22/2020  Patient Name: Lester Moreno  Date of admission: 12/19/2020  5:24 PM  Patient's age: 56 y.o., 1960  Admission Dx: Acute kidney injury ([facility] [address] [N17.9]    Reason for Consult: anemia  Requesting Physician: Tim Baker MD    CHIEF COMPLAINT:  No chief complaint on file. flank pain    History Obtained From: Patient and chart    HISTORY OF PRESENT ILLNESS:      Lester Moreno is a 56 y.o.  male who is admitted to the hospital for flank/abdominal pain. Patient also has decreased urine output. Patient had elevated creatinine at 7.8. He was seen by nephrology and started on hemodialysis. Patient has history of PE remotely and states he is  He denies any chest, drenching night sweats or fever chills. He denies any alcohol or tobacco use. Also his hemoglobin noted to be 10.6 therefore hematology consulted for evaluation of his anemia. His WBC 12.3, platelets 780. Past Medical History:   has no past medical history on file. Past Surgical History:   has a past surgical history that includes Cholecystectomy.      Medications:    Prior to Admission medications    Not on File     Current Facility-Administered Medications   Medication Dose Route Frequency Provider Last Rate Last Admin    famotidine (PEPCID) tablet 10 mg  10 mg Oral Daily Wilman Burleson, APRN - NP   10 mg at 12/22/20 0828    amLODIPine (NORVASC) tablet 10 mg  10 mg Oral Daily Wilman Burleson, APRN - NP   10 mg at 12/22/20 0831    apixaban (ELIQUIS) tablet 10 mg  10 mg Oral BID Wilman Burleson, APRN - NP   10 mg at 12/22/20 0828    tamsulosin (FLOMAX) capsule 0.4 mg  0.4 mg Oral Daily John Wright MD   0.4 mg at 12/22/20 5127    cefTRIAXone (ROCEPHIN) 1 g IVPB in 50 mL D5W minibag  1 g Intravenous Q24H John Wright MD   Stopped at 12/22/20 0937    0.9 % sodium chloride infusion   Intravenous Continuous Tim Baker MD 75 mL/hr at 12/20/20 2208 New Bag at 12/20/20 2208  sodium chloride flush 0.9 % injection 10 mL  10 mL Intravenous 2 times per day Missy Patel MD   10 mL at 12/22/20 0829    sodium chloride flush 0.9 % injection 10 mL  10 mL Intravenous PRN Missy Patel MD        nicotine (NICODERM CQ) 21 MG/24HR 1 patch  1 patch Transdermal Daily PRN Missy Patel MD        promethazine (PHENERGAN) tablet 12.5 mg  12.5 mg Oral Q6H PRN Missy Patel MD        Or    ondansetron [facility]) injection 4 mg  4 mg Intravenous Q6H PRN Missy Patel MD        acetaminophen (TYLENOL) tablet 650 mg  650 mg Oral Q6H PRN Missy Patel MD        Or    acetaminophen (TYLENOL) suppository 650 mg  650 mg Rectal Q6H PRN Missy Patel MD           Allergies:  Patient has no known allergies. Social History:   reports that he has never smoked. He has never used smokeless tobacco.     Family History: family history is not on file. REVIEW OF SYSTEMS:    Constitutional: No fever or chills. No night sweats, no weight loss   Eyes: No eye discharge, double vision, or eye pain   HEENT: negative for sore mouth, sore throat, hoarseness and voice change   Respiratory: negative for cough , sputum, dyspnea, wheezing, hemoptysis, chest pain   Cardiovascular: negative for chest pain, dyspnea, palpitations, orthopnea, PND   Gastrointestinal: negative for nausea, vomiting, diarrhea, constipation, abdominal pain, Dysphagia, hematemesis and hematochezia   Genitourinary: negative for frequency, dysuria, nocturia, urinary incontinence, and hematuria   Integument: negative for rash, skin lesions, bruises.    Hematologic/Lymphatic: negative for easy bruising, bleeding, lymphadenopathy, or petechiae   Endocrine: negative for heat or cold intolerance,weight changes, change in bowel habits and hair loss   Musculoskeletal: negative for myalgias, arthralgias, pain, joint swelling,and bone pain   Neurological: negative for headaches, dizziness, seizures, weakness, numbness    PHYSICAL EXAM: likely longstanding as there is bilateral cortical atrophy. Of note, there   is fluid around the right kidney, as seen on yesterday's exam, concerning for   a calyceal rupture. 2. Abnormal bladder wall thickening, most likely due to reactive changes from   chronic bladder outlet obstruction when correlated with yesterday's CT. However, malignancy cannot be excluded and further evaluation with cystoscopy   should be considered. IR NON TUNNELED CATH WO PORT REPLACEMENT   Final Result   Successful ultrasound and fluoroscopy guided non-tunneled hemodialysis   catheter placement. Primary Problem  Acute kidney injury [facility])    Active Hospital Problems    Diagnosis Date Noted    Acute deep vein thrombosis (DVT) of left peroneal vein ([facility] [address] [I82.452] 12/21/2020    Essential hypertension [I10] 12/21/2020    Acute kidney injury ([facility] [address] [N17.9] 12/19/2020    Bladder obstruction [N32.0] 12/19/2020    Leg edema, right [R60.0] 12/19/2020         IMPRESSION:   1. Anemia: Most likely anemia of chronic disease also renal disease  2. Kidney injury: Evaluated by nephrology and received hemodialysis  3. Bilateral hydronephrosis  4. Bladder obstruction  5. History of PE in the past    RECOMMENDATIONS:  1. I reviewed the laboratory data, imaging studies, diagnosis, prognosis and treatment options with patient  2. His anemia appears most likely multifactorial from chronic disease as well as renal disease  3. I will check peripheral blood smear, rule out any nutritional deficiency  4. He may benefit from erythropoietin stimulating agent  5. .  Follow-up with hematology as outpatient at [facility] in 2 to 3 weeks      Discussed with patient and Nurse. Thank you for asking us to see this patient. Laith Key MD  Hematologist/Medical Oncologist    Cell: [phone] This note is created with the assistance of a speech recognition program.  While intending to generate a document that actually reflects the content of the visit, the document can still have some errors including those of syntax and sound a like substitutions which may escape proof reading. It such instances, actual meaning can be extrapolated by contextual diversion.

## 2020-12-22 NOTE — PROGRESS NOTES
[facility]  Office: [address], DO, Ellen Bautista, DO, Shira Rogers, DO, Robbinanca Red Blood, DO, Kaden Neal MD, Checo Helms MD, Marcos Velasquez MD, Nurys Wagner MD, Diya Gustafson MD, Piter Scruggs MD, Lee Ann Michaud MD, Miguelina Muñoz MD, Paul Ontiveros MD, Tiffany Lamar, DO, Subha Fortune MD, Jaqueline Newman MD, Brad Standard, DO, Lisa Noel MD,  Godfrey Thompson, DO, Danuta Crowe MD, Veronica Adams MD, Fidellissett Ortiz, [facility], [facility], [facility], Vilma Becerril, CNP, Diana Giang, CNS, Get Newman, CNP, Coral Trevino, CNP, Wilda Grande, CNP, Nya Peng, CNP, Ray Santacruz, CNP, Nando Ellis PA-C, Kyung Guevara, [facility], Huang Regan, CNP, Xin Hardy, CNP, Osmany Miller, CNP, Agata Hoff, CNP, America Walter, CNP         [facility]   [address] [facility]    Progress Note    12/22/2020    8:52 AM    Name:   Perley Osler  MRN:     [id number]     Acct:      [de-identified]   Room:   [address] [place] [address] Day:  3  Admit Date:  12/19/2020  5:24 PM    PCP:   No primary care provider on file. Code Status:  Full Code    Subjective:     C/C: Flank pain  Interval History Status: improved. Patient evaluated in room resting in bed awaiting ultrasound of renals. Trevino continues with good urinary output. Tentative plan for HD catheter explantation today.   No patient concerns at this time    Brief History:     Per records: Ct Abdomen Pelvis Wo Contrast    Addendum Date: 12/19/2020    ADDENDUM: Noted within the body of the report there is a 2 mm left lower lobe nodule likely requiring no specific follow-up. Please refer to guidelines below. Guidelines for follow-up and management of pulmonary nodules found on abdomen CT: <6 mm - No follow up recommend on the basis of the estimated low risk of malignancy. 6-8-mm - recommend follow-up chest CT after an appropriate interval (3-12 months depending on clinical risk). >8mm - immediate chest CT for further evaluation. Radiology 2017 http://pubs. rsna.org/doi/full/10.1148/radiol. 6817418078     Result Date: 12/19/2020  Severe bilateral hydronephrosis. Markedly distended urinary bladder. No obstructing renal calculus is visualized. Stranding and fluid surrounding the right kidney. Findings could be related to back flow from severe chronic distention of the urinary bladder. Infection is not excluded on the basis of this examination. Urology consultation should be considered. Us Renal Complete    Result Date: 12/20/2020  1. Severe bilateral hydronephrosis, similar to the prior exam.  This is likely longstanding as there is bilateral cortical atrophy. Of note, there is fluid around the right kidney, as seen on yesterday's exam, concerning for a calyceal rupture. 2. Abnormal bladder wall thickening, most likely due to reactive changes from chronic bladder outlet obstruction when correlated with yesterday's CT. However, malignancy cannot be excluded and further evaluation with cystoscopy should be considered. Ir Non Tunneled Cath Wo Port Replacement    Result Date: 12/20/2020  Successful ultrasound and fluoroscopy guided non-tunneled hemodialysis catheter placement.        Physical Examination:        General appearance:  alert, cooperative and no distress  Mental Status:  oriented to person, place and time and normal affect  Lungs:  clear to auscultation bilaterally, normal effort Heart: irregular rate and rhythm, no murmur  Abdomen:  soft, nontender, nondistended, normal bowel sounds, no masses, hepatomegaly, splenomegaly  : Trevino catheter  Extremities: Right lower extremity +1 nonpitting edema, redness, tenderness in the calves  Skin:  no gross lesions, rashes, induration, right chest hemodialysis access    Assessment:        Hospital Problems           Last Modified POA    * (Principal) Acute kidney injury ([address] 12/19/2020 Yes    Obstruction of kidney 12/19/2020 Yes    Bilateral hydronephrosis 12/19/2020 Yes    Bladder obstruction 12/19/2020 Yes    Leg edema, right 12/19/2020 Yes    Acute deep vein thrombosis (DVT) of left peroneal vein ([address] 12/21/2020 Yes    Essential hypertension 12/21/2020 Yes          Plan:        1. Acute kidney injury: Worsened by bilateral hydronephrosis requiring hemodialysis. Improving, discussed with nephrology. Patient will most likely have hemodialysis catheter explanted today, renal US today . We will continue to trend BMP, monitor urinary output with Trevino catheter  2. Back pain: Worsened by acute kidney injury with bilateral hydronephrosis. Resolved  3. Acute urinary retention with UTI: Rocephin, Flomax, continue Trevino catheter with urology consult, patient will be discharged home with Trevino catheter and follow-up with urology as outpatient for further work-up  4. GI/DVT prophylaxis: Heparin, Pepcid  5. Right lower extremity swelling: Bilateral venous duplex completed, left lower extremity peroneal acute DVT identified- started on eliquis  6. Essential hypertension: stable, continue amlodipine   7. likely discharge home today with close follow-up with nephrology as outpatient once hemodialysis catheter is explanted.   Stable for discharge once cleared by nephrology     SHARON Smith NP  12/22/2020  8:52 AM

## 2020-12-23 VITALS
TEMPERATURE: 98.3 F | WEIGHT: 231.92 LBS | HEART RATE: 88 BPM | RESPIRATION RATE: 20 BRPM | DIASTOLIC BLOOD PRESSURE: 94 MMHG | OXYGEN SATURATION: 97 % | SYSTOLIC BLOOD PRESSURE: 166 MMHG

## 2020-12-23 LAB
ANION GAP SERPL CALCULATED.3IONS-SCNC: 18 MMOL/L (ref 9–17)
BUN BLDV-MCNC: 51 MG/DL (ref 8–23)
BUN/CREAT BLD: ABNORMAL (ref 9–20)
CALCIUM SERPL-MCNC: 8.5 MG/DL (ref 8.6–10.4)
CHLORIDE BLD-SCNC: 104 MMOL/L (ref 98–107)
CO2: 19 MMOL/L (ref 20–31)
CREAT SERPL-MCNC: 3.46 MG/DL (ref 0.7–1.2)
GFR AFRICAN AMERICAN: 22 ML/MIN
GFR NON-AFRICAN AMERICAN: 18 ML/MIN
GFR SERPL CREATININE-BSD FRML MDRD: ABNORMAL ML/MIN/{1.73_M2}
GFR SERPL CREATININE-BSD FRML MDRD: ABNORMAL ML/MIN/{1.73_M2}
GLUCOSE BLD-MCNC: 100 MG/DL (ref 70–99)
HCT VFR BLD CALC: 33.9 % (ref 40.7–50.3)
HEMOGLOBIN: 11.1 G/DL (ref 13–17)
MAGNESIUM: 1.8 MG/DL (ref 1.6–2.6)
MCH RBC QN AUTO: 29.1 PG (ref 25.2–33.5)
MCHC RBC AUTO-ENTMCNC: 32.7 G/DL (ref 28.4–34.8)
MCV RBC AUTO: 88.7 FL (ref 82.6–102.9)
NRBC AUTOMATED: 0 PER 100 WBC
PATHOLOGIST REVIEW: NORMAL
PDW BLD-RTO: 12.5 % (ref 11.8–14.4)
PLATELET # BLD: 319 K/UL (ref 138–453)
PMV BLD AUTO: 9.7 FL (ref 8.1–13.5)
POTASSIUM SERPL-SCNC: 3.2 MMOL/L (ref 3.7–5.3)
RBC # BLD: 3.82 M/UL (ref 4.21–5.77)
SODIUM BLD-SCNC: 141 MMOL/L (ref 135–144)
SURGICAL PATHOLOGY REPORT: NORMAL
WBC # BLD: 10.4 K/UL (ref 3.5–11.3)

## 2020-12-23 PROCEDURE — 99232 SBSQ HOSP IP/OBS MODERATE 35: CPT | Performed by: INTERNAL MEDICINE

## 2020-12-23 PROCEDURE — 6370000000 HC RX 637 (ALT 250 FOR IP): Performed by: NURSE PRACTITIONER

## 2020-12-23 PROCEDURE — 36415 COLL VENOUS BLD VENIPUNCTURE: CPT

## 2020-12-23 PROCEDURE — 85027 COMPLETE CBC AUTOMATED: CPT

## 2020-12-23 PROCEDURE — 80048 BASIC METABOLIC PNL TOTAL CA: CPT

## 2020-12-23 PROCEDURE — 6360000002 HC RX W HCPCS: Performed by: STUDENT IN AN ORGANIZED HEALTH CARE EDUCATION/TRAINING PROGRAM

## 2020-12-23 PROCEDURE — 6370000000 HC RX 637 (ALT 250 FOR IP): Performed by: INTERNAL MEDICINE

## 2020-12-23 PROCEDURE — 83735 ASSAY OF MAGNESIUM: CPT

## 2020-12-23 PROCEDURE — 6370000000 HC RX 637 (ALT 250 FOR IP): Performed by: STUDENT IN AN ORGANIZED HEALTH CARE EDUCATION/TRAINING PROGRAM

## 2020-12-23 PROCEDURE — 2580000003 HC RX 258: Performed by: STUDENT IN AN ORGANIZED HEALTH CARE EDUCATION/TRAINING PROGRAM

## 2020-12-23 RX ORDER — POTASSIUM CHLORIDE 20 MEQ/1
40 TABLET, EXTENDED RELEASE ORAL ONCE
Status: COMPLETED | OUTPATIENT
Start: 2020-12-23 | End: 2020-12-23

## 2020-12-23 RX ORDER — CEPHALEXIN 500 MG/1
500 CAPSULE ORAL 2 TIMES DAILY
Qty: 14 CAPSULE | Refills: 0 | Status: SHIPPED | OUTPATIENT
Start: 2020-12-23 | End: 2020-12-30

## 2020-12-23 RX ORDER — TAMSULOSIN HYDROCHLORIDE 0.4 MG/1
0.4 CAPSULE ORAL DAILY
Qty: 30 CAPSULE | Refills: 0 | Status: SHIPPED | OUTPATIENT
Start: 2020-12-23 | End: 2021-01-21 | Stop reason: SDUPTHER

## 2020-12-23 RX ORDER — AMLODIPINE BESYLATE 10 MG/1
10 TABLET ORAL DAILY
Qty: 30 TABLET | Refills: 0 | Status: SHIPPED | OUTPATIENT
Start: 2020-12-23 | End: 2021-01-26 | Stop reason: SDUPTHER

## 2020-12-23 RX ADMIN — FAMOTIDINE 10 MG: 20 TABLET, FILM COATED ORAL at 10:52

## 2020-12-23 RX ADMIN — TAMSULOSIN HYDROCHLORIDE 0.4 MG: 0.4 CAPSULE ORAL at 08:23

## 2020-12-23 RX ADMIN — CEFTRIAXONE SODIUM 1 G: 1 INJECTION, POWDER, FOR SOLUTION INTRAMUSCULAR; INTRAVENOUS at 10:57

## 2020-12-23 RX ADMIN — AMLODIPINE BESYLATE 10 MG: 10 TABLET ORAL at 08:23

## 2020-12-23 RX ADMIN — POTASSIUM CHLORIDE 40 MEQ: 1500 TABLET, EXTENDED RELEASE ORAL at 10:59

## 2020-12-23 RX ADMIN — APIXABAN 10 MG: 5 TABLET, FILM COATED ORAL at 08:23

## 2020-12-23 NOTE — DISCHARGE SUMMARY
[facility]  Office: [address] [facility], DO, Jennifer [place], DO, Ronaldo Gordon, DO, Dayanara Cortland Blood, DO, Candy Chavez MD, Rima Verma MD, Corbin Peralta MD, Robson Shirley MD, Alisha Aguirre MD, Emily Fox MD, Jhon Reaves MD, Juan Luis Avalos MD, Mbonu Cassandria Favre, MD, Dina Diaz DO, Juana Roland MD, Mally Kumari MD, Freddy Rene, DO, Jt Calle MD,  Saintclair Lank, DO, Jyothi Jaimes MD, Nichol Baptiste MD, Daryle Springer, [facility], [facility], CNP, Amy Malhotra, CNP, Keaton Haley, CNS, Arlette Aden, CNP, Tigre Piña, CNP, Ciro Torres, CNP, Tasneem Carroll, CNP, Jt Tuttle, CNP, Josemanuel Beatty PA-C, David Pyle, [facility], Jaylon Shaffer, CNP, Sd Baldwin, CNP, Brennan Haddad, CNP, Julia Allen, CNP, Charity Smith, CNP         [facility]   [address] [facility]    Discharge Summary     Patient ID: Sheng Aguirre  :  1960   MRN: [id number]     ACCOUNT:  [de-identified]   Patient's PCP: No primary care provider on file.   Admit Date: 2020   Discharge Date: 2020     Length of Stay: 4  Code Status:  Full Code  Admitting Physician: Jhon Reaves MD  Discharge Physician: Rupali Staley DO     Active Discharge Diagnoses:     Hospital Problem Lists:  Principal Problem:    Acute kidney injury [facility])  Active Problems:    Bladder obstruction    Leg edema, right    Acute deep vein thrombosis (DVT) of left peroneal vein [address]    Essential hypertension  Resolved Problems:    Obstruction of kidney    Bilateral hydronephrosis      Admission Condition:  fair     Discharged Condition: stable    Hospital Stay:     Hospital Course:  Sheng Aguirre is a 56 y.o. male who was admitted for the management of   Acute kidney injury ([facility] , presented to ER with flank pain, urine retention This is a 57-year-old male that presents to [facility] as a transfer from [facility] with acute kidney injury and urinary retention. He presented the emergency room with abdominal distention and flank pain was found to have bilateral hydronephrosis and acute urinary retention. He had greater than 2 L drained from the bladder. He had acute renal failure to the extent that he needed dialysis treatments here while hospitalized. Ultimately his condition stabilized and his renal function was improving after decompression of bladder. He was evaluated urology and will need an outpatient cystoscopy for further evaluation once his acute renal failure stabilizes.     Significant therapeutic interventions: As above    Significant Diagnostic Studies:   Labs / Micro:  CBC:   Lab Results   Component Value Date    WBC 10.4 12/23/2020    RBC 3.82 12/23/2020    HGB 11.1 12/23/2020    HCT 33.9 12/23/2020    MCV 88.7 12/23/2020    MCH 29.1 12/23/2020    MCHC 32.7 12/23/2020    RDW 12.5 12/23/2020     12/23/2020     BMP:    Lab Results   Component Value Date    GLUCOSE 100 12/23/2020     12/23/2020    K 3.2 12/23/2020     12/23/2020    CO2 19 12/23/2020    ANIONGAP 18 12/23/2020    BUN 51 12/23/2020    CREATININE 3.46 12/23/2020    BUNCRER NOT REPORTED 12/23/2020    CALCIUM 8.5 12/23/2020    LABGLOM 18 12/23/2020    GFRAA 22 12/23/2020    GFR      12/23/2020    GFR NOT REPORTED 12/23/2020        Radiology:  Ct Abdomen Pelvis Wo Contrast    Addendum Date: 12/19/2020 ADDENDUM: Noted within the body of the report there is a 2 mm left lower lobe nodule likely requiring no specific follow-up. Please refer to guidelines below. Guidelines for follow-up and management of pulmonary nodules found on abdomen CT: <6 mm - No follow up recommend on the basis of the estimated low risk of malignancy. 6-8-mm - recommend follow-up chest CT after an appropriate interval (3-12 months depending on clinical risk). >8mm - immediate chest CT for further evaluation. Radiology 2017 http://pubs. rsna.org/doi/full/10.1148/radiol. 5921969800     Result Date: 12/19/2020  Severe bilateral hydronephrosis. Markedly distended urinary bladder. No obstructing renal calculus is visualized. Stranding and fluid surrounding the right kidney. Findings could be related to back flow from severe chronic distention of the urinary bladder. Infection is not excluded on the basis of this examination. Urology consultation should be considered. Us Renal Complete    Result Date: 12/22/2020  Bladder wall is significantly thickened and nodular in appearance concerning for mass or hematoma. Severe hydronephrosis bilaterally. Us Renal Complete    Result Date: 12/20/2020  1. Severe bilateral hydronephrosis, similar to the prior exam.  This is likely longstanding as there is bilateral cortical atrophy. Of note, there is fluid around the right kidney, as seen on yesterday's exam, concerning for a calyceal rupture. 2. Abnormal bladder wall thickening, most likely due to reactive changes from chronic bladder outlet obstruction when correlated with yesterday's CT. However, malignancy cannot be excluded and further evaluation with cystoscopy should be considered. Ir Non Tunneled Cath Wo Port Replacement    Result Date: 12/20/2020  Successful ultrasound and fluoroscopy guided non-tunneled hemodialysis catheter placement.        Consultations:    Consults:     Final Specialist Recommendations/Findings: IP CONSULT TO UROLOGY  IP CONSULT TO NEPHROLOGY  IP CONSULT TO ONCOLOGY      The patient was seen and examined on day of discharge and this discharge summary is in conjunction with any daily progress note from day of discharge. Discharge plan:     Disposition: Home    Physician Follow Up:   [facility]  [address] [facility]  [address] [facility]  [id number] [phone]  In 2 weeks      Joy Cee MD  [address] [place] [address],  [address], [address]  [address] [facility]  [phone]    In 1 week  for follow up after hospitalization    Jeb Bobby MD  [address]  [phone]    In 1 week  for follow up after hospitalization    Maurilio Garcia MD  [address] [place] [address]  [phone]    Schedule an appointment as soon as possible for a visit  for follow up after hospitalization    [address] [address]  [phone]      Please  New Patient Packet from this office and, after completed, they will call you to schedule appointment. Requiring Further Evaluation/Follow Up POST HOSPITALIZATION/Incidental Findings:  Follow-up labs per nephrology, outpatient cystoscopy per urology    Diet: renal diet    Activity: As tolerated    Instructions to Patient: Take medications as prescribed    Discharge Medications:      Medication List      START taking these medications    amLODIPine 10 MG tablet  Commonly known as: NORVASC  Take 1 tablet by mouth daily     * apixaban 5 MG Tabs tablet  Commonly known as: ELIQUIS  Take 2 tablets by mouth 2 times daily for 5 days     * apixaban 5 MG Tabs tablet  Commonly known as: Eliquis  Take 1 tablet by mouth 2 times daily  Start taking on: December 28, 2020     cephALEXin 500 MG capsule  Commonly known as: KEFLEX  Take 1 capsule by mouth 2 times daily for 7 days     tamsulosin 0.4 MG capsule  Commonly known as: FLOMAX  Take 1 capsule by mouth daily * This list has 2 medication(s) that are the same as other medications prescribed for you. Read the directions carefully, and ask your doctor or other care provider to review them with you. Where to Get Your Medications      These medications were sent to [address] [address]    Phone: [phone]   · amLODIPine 10 MG tablet  · apixaban 5 MG Tabs tablet  · apixaban 5 MG Tabs tablet  · cephALEXin 500 MG capsule  · tamsulosin 0.4 MG capsule         No discharge procedures on file. Time Spent on discharge is  27 minutes in patient examination, evaluation, counseling as well as medication reconciliation, prescriptions for required medications, discharge plan and follow up. Electronically signed by   Otis Gonsalves DO  12/23/2020  2:04 PM      Thank you Dr. Ian Howe primary care provider on file. for the opportunity to be involved in this patient's care.

## 2020-12-23 NOTE — PROGRESS NOTES
Kostas Pier a 47-year-old male who presented to outlying facility with complaints of right flank pain.  He states he has had increased pain over the last several days with decreased urine output.  A Trevino catheter was placed in which 8 L of urine was drained.  ED work-up at [facility] revealed a BUN  <336 with a creatinine of 7.88.  He has no known history of nephrologic problems. Gino Godwin has had urinary retention in the past but takes no medications currently. Gino Godwin does endorse a 1 month history of right lower leg edema.  He has a remote history of PE and was on Eliquis approximately 7 years ago x 6 months.  Currently, patient denies nausea, vomiting, chest pain, fever, chills or shortness of breath.  He is employed as a , is independent in his ADLs, and takes no home medications.  He denies use of alcohol, tobacco, or recreational drugs. Review of Systems:     Constitutional:  negative for chills, fevers, sweats  Respiratory:  negative for cough, dyspnea on exertion, shortness of breath, wheezing  Cardiovascular:  negative for chest pain, chest pressure/discomfort, lower extremity edema, palpitations  Gastrointestinal:  negative for abdominal pain, constipation, diarrhea, nausea, vomiting  Neurological:  negative for dizziness, headache    Medications: Allergies:  No Known Allergies    Current Meds:   Scheduled Meds:    famotidine  10 mg Oral Daily    amLODIPine  10 mg Oral Daily    apixaban  10 mg Oral BID    tamsulosin  0.4 mg Oral Daily    cefTRIAXone (ROCEPHIN) IV  1 g Intravenous Q24H    sodium chloride flush  10 mL Intravenous 2 times per day     Continuous Infusions:    sodium chloride 75 mL/hr at 12/22/20 2350     PRN Meds: sodium chloride flush, nicotine, promethazine **OR** ondansetron, acetaminophen **OR** acetaminophen    Data:     Past Medical History:   has no past medical history on file. Social History:   reports that he has never smoked.  He has never used smokeless tobacco. Family History: No family history on file. Vitals:  BP (!) 175/106   Pulse 91   Temp 98.2 °F (36.8 °C) (Axillary)   Resp 16   Wt 231 lb 14.8 oz (105.2 kg)   SpO2 94%   Temp (24hrs), Av.2 °F (36.8 °C), Min:97.1 °F (36.2 °C), Max:98.9 °F (37.2 °C)    Recent Labs     20  0750   POCGLU 97       I/O (24Hr):     Intake/Output Summary (Last 24 hours) at 2020 0930  Last data filed at 2020 0400  Gross per 24 hour   Intake 2249 ml   Output 4775 ml   Net -2526 ml       Labs:  Hematology:  Recent Labs     20  0416 20  1644 20  0603   WBC 12.3* 11.6* 10.4   RBC 3.62* 3.73* 3.82*   HGB 10.6* 11.2* 11.1*   HCT 33.8* 34.0* 33.9*   MCV 93.4 91.2 88.7   MCH 29.3 30.0 29.1   MCHC 31.4 32.9 32.7   RDW 12.4 12.4 12.5    312 319   MPV 10.1 10.0 9.7     Chemistry:  Recent Labs     20  0748 20  2036 20  0039    139 136   K 4.0 3.5* 4.1    104 103   CO2 21 22 20   GLUCOSE 98 108* 74   BUN 80* 58* 58*   CREATININE 5.55* 3.86* 3.83*   ANIONGAP 17 13 13   LABGLOM 10* 16* 16*   GFRAA 13* 19* 19*   CALCIUM 8.7 8.3* 8.4*     Recent Labs     20  1324 20  0750   PROT 6.2*  --    POCGLU  --  97     ABG:  Lab Results   Component Value Date    PHART 7.384 2020    NYH2WZI 30.0 2020    PO2ART 70.5 2020    WYF6IGN 17.5 2020    NBEA 6.1 2020    PBEA NOT REPORTED 2020    P7ILLLIU 94.2 2020    FIO2 21 2020     Lab Results   Component Value Date/Time    SPECIAL 1CC R AC 2020 02:59 PM     Lab Results   Component Value Date/Time    CULTURE NO GROWTH 2 DAYS 2020 02:59 PM       Radiology:  Ct Abdomen Pelvis Wo Contrast    Addendum Date: 2020 ADDENDUM: Noted within the body of the report there is a 2 mm left lower lobe nodule likely requiring no specific follow-up. Please refer to guidelines below. Guidelines for follow-up and management of pulmonary nodules found on abdomen CT: <6 mm - No follow up recommend on the basis of the estimated low risk of malignancy. 6-8-mm - recommend follow-up chest CT after an appropriate interval (3-12 months depending on clinical risk). >8mm - immediate chest CT for further evaluation. Radiology 2017 http://pubs. rsna.org/doi/full/10.1148/radiol. 6126599812     Result Date: 12/19/2020  Severe bilateral hydronephrosis. Markedly distended urinary bladder. No obstructing renal calculus is visualized. Stranding and fluid surrounding the right kidney. Findings could be related to back flow from severe chronic distention of the urinary bladder. Infection is not excluded on the basis of this examination. Urology consultation should be considered. Us Renal Complete    Result Date: 12/22/2020  Bladder wall is significantly thickened and nodular in appearance concerning for mass or hematoma. Severe hydronephrosis bilaterally. Us Renal Complete    Result Date: 12/20/2020  1. Severe bilateral hydronephrosis, similar to the prior exam.  This is likely longstanding as there is bilateral cortical atrophy. Of note, there is fluid around the right kidney, as seen on yesterday's exam, concerning for a calyceal rupture. 2. Abnormal bladder wall thickening, most likely due to reactive changes from chronic bladder outlet obstruction when correlated with yesterday's CT. However, malignancy cannot be excluded and further evaluation with cystoscopy should be considered. Ir Non Tunneled Cath Wo Port Replacement    Result Date: 12/20/2020  Successful ultrasound and fluoroscopy guided non-tunneled hemodialysis catheter placement.        Physical Examination:        General appearance:  alert, cooperative and no distress Mental Status:  oriented to person, place and time and normal affect  Lungs:  clear to auscultation bilaterally, normal effort  Heart:  regular rate and rhythm, no murmur  Abdomen:  soft, nontender, nondistended, normal bowel sounds, no masses, hepatomegaly, splenomegaly  Extremities:  no edema, redness, tenderness in the calves  Skin:  no gross lesions, rashes, induration    Assessment:        Hospital Problems           Last Modified POA    * (Principal) Acute kidney injury ([address] 12/22/2020 Yes    Bladder obstruction 12/22/2020 Yes    Leg edema, right 12/22/2020 Yes    Acute deep vein thrombosis (DVT) of left peroneal vein ([address] 12/22/2020 Yes    Essential hypertension 12/22/2020 Yes          Plan:        1. Kidney function continues to improve, remove dialysis catheter if okay with nephrology  2. Outpatient urology follow-up, will likely benefit from cystoscopy at some point  3. Maintain Trevino catheter  4. Monitor and control blood pressure  5. GI and DVT prophylaxis  6. Activity as tolerated  7.  Discharge home today    Lonnie Khoury DO  12/23/2020  9:30 AM

## 2020-12-23 NOTE — PROGRESS NOTES
Successful removal of right IJ non tunneled dialysis cath. Hemostasis achieved with approximately 5 minutes of manual pressure. Sterile dressings applied.

## 2020-12-23 NOTE — CARE COORDINATION
Transitional Planning  Spoke with patient in room to review discharge plan. He is planning for home with his wife and does not anticipate home health needs at this time. He does need a PCP and would like someone at [facility]. Spoke with Carole Antoine, they are accepting new patients, but patient will need to  a New Patient Packet from their office, complete it, and providers will review for acceptance. They will then contact him to schedule appointment. Office information added to discharge instructions with note for patient to  packet.     Discharge [address] [place] [address] Case Management Department  Written by: Dorothea Morrison RN    Patient Name: Pb Infante  Attending Provider: Pancho Whittaker DO  Admit Date: 2020  5:24 PM  MRN: [id number]  Account: [de-identified]                     : 1960  Discharge Date:  20       Disposition: home    Dorothea Morrison RN

## 2020-12-24 ENCOUNTER — HOSPITAL ENCOUNTER (OUTPATIENT)
Dept: LAB | Age: 60
Discharge: HOME | End: 2020-12-24

## 2020-12-24 LAB
ANION GAP SERPL CALCULATED.3IONS-SCNC: 10 MMOL/L (ref 9–17)
BUN BLDV-MCNC: 45 MG/DL (ref 8–23)
BUN/CREAT BLD: 15 (ref 9–20)
CALCIUM SERPL-MCNC: 8.8 MG/DL (ref 8.6–10.4)
CHLORIDE BLD-SCNC: 100 MMOL/L (ref 98–107)
CO2: 26 MMOL/L (ref 20–31)
CREAT SERPL-MCNC: 2.92 MG/DL (ref 0.7–1.2)
ERYTHROPOIETIN: 12 MU/ML (ref 4–27)
GFR AFRICAN AMERICAN: 27 ML/MIN
GFR NON-AFRICAN AMERICAN: 22 ML/MIN
GFR SERPL CREATININE-BSD FRML MDRD: ABNORMAL ML/MIN/{1.73_M2}
GFR SERPL CREATININE-BSD FRML MDRD: ABNORMAL ML/MIN/{1.73_M2}
GLUCOSE BLD-MCNC: 127 MG/DL (ref 70–99)
POTASSIUM SERPL-SCNC: 3.7 MMOL/L (ref 3.7–5.3)
SODIUM BLD-SCNC: 136 MMOL/L (ref 135–144)

## 2020-12-24 PROCEDURE — 80048 BASIC METABOLIC PNL TOTAL CA: CPT

## 2020-12-24 PROCEDURE — 36415 COLL VENOUS BLD VENIPUNCTURE: CPT

## 2020-12-26 ENCOUNTER — HOSPITAL ENCOUNTER (OUTPATIENT)
Age: 60
Discharge: HOME | End: 2020-12-26

## 2020-12-26 LAB
ANION GAP SERPL CALCULATED.3IONS-SCNC: 12 MMOL/L (ref 9–17)
BUN BLDV-MCNC: 42 MG/DL (ref 8–23)
BUN/CREAT BLD: 15 (ref 9–20)
CALCIUM SERPL-MCNC: 9.1 MG/DL (ref 8.6–10.4)
CHLORIDE BLD-SCNC: 97 MMOL/L (ref 98–107)
CO2: 25 MMOL/L (ref 20–31)
CREAT SERPL-MCNC: 2.72 MG/DL (ref 0.7–1.2)
GFR AFRICAN AMERICAN: 29 ML/MIN
GFR NON-AFRICAN AMERICAN: 24 ML/MIN
GFR SERPL CREATININE-BSD FRML MDRD: ABNORMAL ML/MIN/{1.73_M2}
GFR SERPL CREATININE-BSD FRML MDRD: ABNORMAL ML/MIN/{1.73_M2}
GLUCOSE BLD-MCNC: 85 MG/DL (ref 70–99)
POTASSIUM SERPL-SCNC: 3.9 MMOL/L (ref 3.7–5.3)
SODIUM BLD-SCNC: 134 MMOL/L (ref 135–144)

## 2020-12-26 PROCEDURE — 80048 BASIC METABOLIC PNL TOTAL CA: CPT

## 2020-12-26 PROCEDURE — 36415 COLL VENOUS BLD VENIPUNCTURE: CPT

## 2020-12-27 LAB
CULTURE: NORMAL
CULTURE: NORMAL
Lab: NORMAL
Lab: NORMAL
SPECIMEN DESCRIPTION: NORMAL
SPECIMEN DESCRIPTION: NORMAL

## 2020-12-28 ENCOUNTER — HOSPITAL ENCOUNTER (OUTPATIENT)
Dept: LAB | Age: 60
Discharge: HOME | End: 2020-12-28

## 2020-12-28 LAB
ANION GAP SERPL CALCULATED.3IONS-SCNC: 11 MMOL/L (ref 9–17)
BUN BLDV-MCNC: 35 MG/DL (ref 8–23)
BUN/CREAT BLD: 14 (ref 9–20)
CALCIUM SERPL-MCNC: 9.1 MG/DL (ref 8.6–10.4)
CHLORIDE BLD-SCNC: 99 MMOL/L (ref 98–107)
CO2: 26 MMOL/L (ref 20–31)
CREAT SERPL-MCNC: 2.44 MG/DL (ref 0.7–1.2)
GFR AFRICAN AMERICAN: 33 ML/MIN
GFR NON-AFRICAN AMERICAN: 27 ML/MIN
GFR SERPL CREATININE-BSD FRML MDRD: ABNORMAL ML/MIN/{1.73_M2}
GFR SERPL CREATININE-BSD FRML MDRD: ABNORMAL ML/MIN/{1.73_M2}
GLUCOSE BLD-MCNC: 78 MG/DL (ref 70–99)
POTASSIUM SERPL-SCNC: 3.7 MMOL/L (ref 3.7–5.3)
SODIUM BLD-SCNC: 136 MMOL/L (ref 135–144)

## 2020-12-28 PROCEDURE — 80048 BASIC METABOLIC PNL TOTAL CA: CPT

## 2020-12-28 PROCEDURE — 36415 COLL VENOUS BLD VENIPUNCTURE: CPT

## 2021-01-08 ENCOUNTER — OFFICE VISIT (OUTPATIENT)
Dept: UROLOGY | Age: 61
End: 2021-01-08

## 2021-01-08 VITALS
TEMPERATURE: 98 F | HEART RATE: 83 BPM | WEIGHT: 238 LBS | SYSTOLIC BLOOD PRESSURE: 125 MMHG | DIASTOLIC BLOOD PRESSURE: 79 MMHG

## 2021-01-08 DIAGNOSIS — N13.30 HYDRONEPHROSIS DUE TO OBSTRUCTION OF BLADDER: Primary | ICD-10-CM

## 2021-01-08 DIAGNOSIS — N32.0 HYDRONEPHROSIS DUE TO OBSTRUCTION OF BLADDER: Primary | ICD-10-CM

## 2021-01-08 DIAGNOSIS — R33.9 URINARY RETENTION: ICD-10-CM

## 2021-01-08 DIAGNOSIS — N40.1 BENIGN PROSTATIC HYPERPLASIA WITH URINARY RETENTION: ICD-10-CM

## 2021-01-08 DIAGNOSIS — R33.8 BENIGN PROSTATIC HYPERPLASIA WITH URINARY RETENTION: ICD-10-CM

## 2021-01-08 PROCEDURE — 99204 OFFICE O/P NEW MOD 45 MIN: CPT | Performed by: UROLOGY

## 2021-01-08 NOTE — PROGRESS NOTES
allergies. Social History     Tobacco Use   Smoking Status Never Smoker   Smokeless Tobacco Never Used       Social History     Substance and Sexual Activity   Alcohol Use None       REVIEW OF SYSTEMS:  Constitutional: negative  Eyes: negative  Respiratory: negative  Cardiovascular: negative  Gastrointestinal: negative  Musculoskeletal: negative  Genitourinary: negative  Skin: negative   Neurological: negative  Hematological/Lymphatic: negative  Psychological: negative    Physical Exam:    This a 56 y.o. male   There were no vitals filed for this visit. Constitutional: Patient in no acute distress   Neuro: alert and oriented to person place and time. Psych: Mood and affect normal.  Head: atraumatic normocephalic  Eyes: EOMi  HEENT: neck supple, trachea midline  Lungs: Respiratory effort normal  Cardiovascular:  Normal peripheral pulses  Abdomen: Soft, non-tender, non-distended, No CVA  Bladder: non-tender and not distended. FROMx4, no cyanosis clubbing edema, No calf pain, EPCs in place  Skin: warm and dry  Casas: lear yellow      Assessment and Plan      1. Hydronephrosis due to obstruction of bladder    2. Urinary retention    3. Benign prostatic hyperplasia with urinary retention           Plan:      No follow-ups on file.   Continue casas catheter  Repeat RJ in 3 months for BL hydronephrosis  Cysto UDS     Most likely has atonic bladder and will need bladder management regimen in future

## 2021-01-11 ENCOUNTER — TELEPHONE (OUTPATIENT)
Dept: UROLOGY | Age: 61
End: 2021-01-11

## 2021-01-11 NOTE — TELEPHONE ENCOUNTER
Patient is scheduled for surgery on 2/12 at 12:45PM at [facility] and for covid testing on 2/8 at 10:15AM in [place]. Talked to patient and gave him the dates, times and instructions. Patient confirmed and verbalized understanding. Letter mailed out today.

## 2021-01-25 ENCOUNTER — HOSPITAL ENCOUNTER (OUTPATIENT)
Age: 61
Discharge: HOME | End: 2021-01-25

## 2021-01-25 DIAGNOSIS — N17.9 ACUTE KIDNEY INJURY (HCC): ICD-10-CM

## 2021-01-25 LAB
-: NORMAL
ABSOLUTE EOS #: 0.31 K/UL (ref 0–0.44)
ABSOLUTE IMMATURE GRANULOCYTE: 0.03 K/UL (ref 0–0.3)
ABSOLUTE LYMPH #: 1.86 K/UL (ref 1.1–3.7)
ABSOLUTE MONO #: 0.57 K/UL (ref 0.1–1.2)
AMORPHOUS: NORMAL
ANION GAP SERPL CALCULATED.3IONS-SCNC: 5 MMOL/L (ref 9–17)
BACTERIA: NORMAL
BASOPHILS # BLD: 1 % (ref 0–2)
BASOPHILS ABSOLUTE: 0.08 K/UL (ref 0–0.2)
BILIRUBIN URINE: NEGATIVE
BUN BLDV-MCNC: 34 MG/DL (ref 8–23)
BUN/CREAT BLD: 15 (ref 9–20)
CALCIUM IONIZED: 1.28 MMOL/L (ref 1.13–1.33)
CALCIUM SERPL-MCNC: 9.5 MG/DL (ref 8.6–10.4)
CASTS UA: NORMAL /LPF
CHLORIDE BLD-SCNC: 102 MMOL/L (ref 98–107)
CO2: 28 MMOL/L (ref 20–31)
COLOR: YELLOW
COMMENT UA: ABNORMAL
CREAT SERPL-MCNC: 2.3 MG/DL (ref 0.7–1.2)
CREATININE URINE: 103.8 MG/DL (ref 39–259)
CRYSTALS, UA: NORMAL /HPF
DIFFERENTIAL TYPE: ABNORMAL
EOSINOPHILS RELATIVE PERCENT: 4 % (ref 1–4)
EPITHELIAL CELLS UA: NORMAL /HPF (ref 0–5)
GFR AFRICAN AMERICAN: 35 ML/MIN
GFR NON-AFRICAN AMERICAN: 29 ML/MIN
GFR SERPL CREATININE-BSD FRML MDRD: ABNORMAL ML/MIN/{1.73_M2}
GFR SERPL CREATININE-BSD FRML MDRD: ABNORMAL ML/MIN/{1.73_M2}
GLUCOSE BLD-MCNC: 77 MG/DL (ref 70–99)
GLUCOSE URINE: NEGATIVE
HCT VFR BLD CALC: 40.3 % (ref 40.7–50.3)
HEMOGLOBIN: 12.9 G/DL (ref 13–17)
IMMATURE GRANULOCYTES: 0 %
KETONES, URINE: NEGATIVE
LEUKOCYTE ESTERASE, URINE: NEGATIVE
LYMPHOCYTES # BLD: 23 % (ref 24–43)
MCH RBC QN AUTO: 29.9 PG (ref 25.2–33.5)
MCHC RBC AUTO-ENTMCNC: 32 G/DL (ref 28.4–34.8)
MCV RBC AUTO: 93.3 FL (ref 82.6–102.9)
MONOCYTES # BLD: 7 % (ref 3–12)
MUCUS: NORMAL
NITRITE, URINE: NEGATIVE
NRBC AUTOMATED: 0 PER 100 WBC
OTHER OBSERVATIONS UA: NORMAL
PDW BLD-RTO: 13.3 % (ref 11.8–14.4)
PH UA: 6 (ref 5–9)
PHOSPHORUS: 2.9 MG/DL (ref 2.5–4.5)
PLATELET # BLD: 372 K/UL (ref 138–453)
PLATELET ESTIMATE: ABNORMAL
PMV BLD AUTO: 9.2 FL (ref 8.1–13.5)
POTASSIUM SERPL-SCNC: 4.1 MMOL/L (ref 3.7–5.3)
PROTEIN UA: ABNORMAL
PTH INTACT: 191.7 PG/ML (ref 15–65)
RBC # BLD: 4.32 M/UL (ref 4.21–5.77)
RBC # BLD: ABNORMAL 10*6/UL
RBC UA: NORMAL /HPF (ref 0–2)
RENAL EPITHELIAL, UA: NORMAL /HPF
SEG NEUTROPHILS: 65 % (ref 36–65)
SEGMENTED NEUTROPHILS ABSOLUTE COUNT: 5.38 K/UL (ref 1.5–8.1)
SODIUM BLD-SCNC: 135 MMOL/L (ref 135–144)
SPECIFIC GRAVITY UA: 1.01 (ref 1.01–1.02)
TOTAL PROTEIN, URINE: 63 MG/DL
TRICHOMONAS: NORMAL
TURBIDITY: CLEAR
URINE HGB: ABNORMAL
UROBILINOGEN, URINE: NORMAL
VITAMIN D 25-HYDROXY: 41.4 NG/ML (ref 30–100)
WBC # BLD: 8.2 K/UL (ref 3.5–11.3)
WBC # BLD: ABNORMAL 10*3/UL
WBC UA: NORMAL /HPF (ref 0–5)
YEAST: NORMAL

## 2021-01-25 PROCEDURE — 36415 COLL VENOUS BLD VENIPUNCTURE: CPT

## 2021-01-25 PROCEDURE — 84156 ASSAY OF PROTEIN URINE: CPT

## 2021-01-25 PROCEDURE — 82306 VITAMIN D 25 HYDROXY: CPT

## 2021-01-25 PROCEDURE — 85025 COMPLETE CBC W/AUTO DIFF WBC: CPT

## 2021-01-25 PROCEDURE — 83970 ASSAY OF PARATHORMONE: CPT

## 2021-01-25 PROCEDURE — 84100 ASSAY OF PHOSPHORUS: CPT

## 2021-01-25 PROCEDURE — 81001 URINALYSIS AUTO W/SCOPE: CPT

## 2021-01-25 PROCEDURE — 82330 ASSAY OF CALCIUM: CPT

## 2021-01-25 PROCEDURE — 82570 ASSAY OF URINE CREATININE: CPT

## 2021-01-25 PROCEDURE — 80048 BASIC METABOLIC PNL TOTAL CA: CPT

## 2021-01-26 ENCOUNTER — OFFICE VISIT (OUTPATIENT)
Dept: PRIMARY CARE CLINIC | Age: 61
End: 2021-01-26

## 2021-01-26 VITALS
WEIGHT: 232 LBS | HEIGHT: 76 IN | SYSTOLIC BLOOD PRESSURE: 132 MMHG | DIASTOLIC BLOOD PRESSURE: 78 MMHG | HEART RATE: 89 BPM | RESPIRATION RATE: 18 BRPM | OXYGEN SATURATION: 99 % | BODY MASS INDEX: 28.25 KG/M2

## 2021-01-26 DIAGNOSIS — Z86.711 HISTORY OF PULMONARY EMBOLUS (PE): ICD-10-CM

## 2021-01-26 DIAGNOSIS — I10 ESSENTIAL (PRIMARY) HYPERTENSION: Primary | ICD-10-CM

## 2021-01-26 DIAGNOSIS — N17.9 ACUTE KIDNEY INJURY (HCC): ICD-10-CM

## 2021-01-26 DIAGNOSIS — Z76.89 ENCOUNTER TO ESTABLISH CARE: ICD-10-CM

## 2021-01-26 DIAGNOSIS — I82.4Y3 DEEP VEIN THROMBOSIS (DVT) OF PROXIMAL VEIN OF BOTH LOWER EXTREMITIES, UNSPECIFIED CHRONICITY (HCC): ICD-10-CM

## 2021-01-26 PROCEDURE — 99204 OFFICE O/P NEW MOD 45 MIN: CPT | Performed by: NURSE PRACTITIONER

## 2021-01-26 RX ORDER — AMLODIPINE BESYLATE 10 MG/1
10 TABLET ORAL DAILY
Qty: 90 TABLET | Refills: 1 | Status: SHIPPED | OUTPATIENT
Start: 2021-01-26 | End: 2021-07-19

## 2021-01-26 ASSESSMENT — ENCOUNTER SYMPTOMS
ANAL BLEEDING: 0
TROUBLE SWALLOWING: 0
SORE THROAT: 0
BLOOD IN STOOL: 0
NAUSEA: 0
COUGH: 0
DIARRHEA: 0
SHORTNESS OF BREATH: 0
SINUS PRESSURE: 0
CHEST TIGHTNESS: 0
ABDOMINAL PAIN: 0
WHEEZING: 0
RHINORRHEA: 0
VOMITING: 0

## 2021-01-26 ASSESSMENT — PATIENT HEALTH QUESTIONNAIRE - PHQ9
2. FEELING DOWN, DEPRESSED OR HOPELESS: 0
SUM OF ALL RESPONSES TO PHQ QUESTIONS 1-9: 0

## 2021-01-26 NOTE — PROGRESS NOTES
Name: Crissy Wilde  : 1960         Chief Complaint:     Chief Complaint   Patient presents with    New Patient     [facility]. History of Present Illness:      Crissy Wilde is a 56 y.o.  male who presents with New Patient ([facility]. )      Fay Allred is here today for a routine office visit to [facility]. He has not seen a provider in roughly 5 years. He is a  and moved here from [place]. He reports he was in good health until this past December when he was admitted to the hospital for abdominal pain and flank pain. He was found to have a creatinine of 8 along with imaging consistent with bilateral hydronephrosis. He required to have hemodialysis while admitted and had a Trevino catheter placed. They believe that the urinary retention was likely due to BPH. He still has his Trevino catheter placed and will be undergoing a cystoscopy . He had follow-up with nephrology yesterday and they are going to continue to monitor renal function. While he was hospitalized he was also found to have chronic bilateral DVT. He was started on anticoagulation. He does report roughly 8 years ago he had a pulmonary embolism and was on anticoagulation for 6 months. He has an appointment to see hematology on February 3. He denies any flank pain, headaches, shortness of breath, chest pain, dizziness, hematuria or bloody stools. He reports prior to this illness he was active walking or riding his bicycle several times a week. Hypertension  This is a new problem. Episode onset: While hospitalized in December. The problem has been waxing and waning since onset. The problem is controlled. Associated symptoms include peripheral edema. Pertinent negatives include no chest pain, headaches, neck pain, palpitations or shortness of breath. Risk factors for coronary artery disease include male gender. Past treatments include calcium channel blockers. There are no compliance problems. in HPI    Review of Systems   Constitutional: Negative for activity change, appetite change, chills, fatigue, fever and unexpected weight change. HENT: Negative for congestion, postnasal drip, rhinorrhea, sinus pressure, sore throat and trouble swallowing. Respiratory: Negative for cough, chest tightness, shortness of breath and wheezing. Cardiovascular: Positive for leg swelling. Negative for chest pain and palpitations. Gastrointestinal: Negative for abdominal pain, anal bleeding, blood in stool, diarrhea, nausea and vomiting. Genitourinary: Positive for difficulty urinating. Negative for flank pain, hematuria, penile swelling and testicular pain. Trevino catheter in place   Musculoskeletal: Negative for arthralgias, gait problem and neck pain. Skin: Negative for rash and wound. Neurological: Negative for dizziness, syncope, weakness, light-headedness and headaches. Hematological: Does not bruise/bleed easily. Psychiatric/Behavioral: Negative for agitation, decreased concentration and sleep disturbance. The patient is not nervous/anxious. Physical Exam:   Vitals:  /78 (Site: Left Upper Arm, Position: Sitting, Cuff Size: Large Adult)   Pulse 89   Resp 18   Ht 6' 4\" (1.93 m)   Wt 232 lb (105.2 kg)   SpO2 99%   BMI 28.24 kg/m²     Physical Exam  Vitals signs and nursing note reviewed. Constitutional:       General: He is not in acute distress. Appearance: Normal appearance. He is not ill-appearing, toxic-appearing or diaphoretic. HENT:      Head: Normocephalic and atraumatic. Right Ear: There is no impacted cerumen. Left Ear: There is no impacted cerumen. Nose: No congestion or rhinorrhea. Mouth/Throat:      Mouth: Mucous membranes are moist.      Pharynx: No oropharyngeal exudate or posterior oropharyngeal erythema. Eyes:      General:         Right eye: No discharge. Left eye: No discharge.       Conjunctiva/sclera: Conjunctivae normal.   Neck:      Musculoskeletal: Normal range of motion and neck supple. Cardiovascular:      Rate and Rhythm: Normal rate and regular rhythm. Heart sounds: No murmur. Pulmonary:      Effort: Pulmonary effort is normal.      Breath sounds: Normal breath sounds. No wheezing, rhonchi or rales. Abdominal:      General: There is no distension. Palpations: Abdomen is soft. Tenderness: There is no abdominal tenderness. Hernia: No hernia is present. Musculoskeletal:      Right lower leg: Edema (Trace) present. Left lower leg: Edema (Trace) present. Lymphadenopathy:      Cervical: No cervical adenopathy. Skin:     Findings: No bruising or erythema. Neurological:      General: No focal deficit present. Mental Status: He is alert and oriented to person, place, and time. Motor: No weakness. Psychiatric:         Mood and Affect: Mood normal.         Behavior: Behavior normal.         Data:     Lab Results   Component Value Date     01/25/2021    K 4.1 01/25/2021     01/25/2021    CO2 28 01/25/2021    BUN 34 01/25/2021    CREATININE 2.30 01/25/2021    GLUCOSE 77 01/25/2021    PROT 6.2 12/20/2020    LABALBU 3.1 12/20/2020    BILITOT 0.37 12/20/2020    ALKPHOS 73 12/20/2020    AST 7 12/20/2020    ALT 10 12/20/2020     Lab Results   Component Value Date    WBC 8.2 01/25/2021    RBC 4.32 01/25/2021    HGB 12.9 01/25/2021    HCT 40.3 01/25/2021    MCV 93.3 01/25/2021    MCH 29.9 01/25/2021    MCHC 32.0 01/25/2021    RDW 13.3 01/25/2021     01/25/2021    MPV 9.2 01/25/2021     No results found for: TSH  No results found for: CHOL, HDL, PSA, LABA1C    Assessment/Plan:      Diagnosis Orders   1. Essential (primary) hypertension  amLODIPine (NORVASC) 10 MG tablet    Lipid Panel   2. Acute kidney injury ([address]     3. History of pulmonary embolus (PE)     4. Deep vein thrombosis (DVT) of proximal vein of both lower extremities, unspecified chronicity ([address]     5. Encounter to establish care       Essential hypertension- Stable. Continue amlodipine 10 mg daily. Will obtain lipid panel. NICOLEsecondary to obstructive uropathy thought to be from BPH. Renal function is improving but still abnormal.  He will continue to follow with nephrology and urology. He has an upcoming cystoscopy February 12. History of pulmonary embolism and DVTcontinue Xarelto 20 mg daily. He will follow up with hematology February 3.      1.  Jayde Ellis received counseling on the following healthy behaviors: nutrition, exercise and medication adherence  2. Patient given educational materials - see patient instructions  3. Was a self-tracking handout given in paper form or via [employer]? No  If yes, see orders or list here. 4.  Discussed use, benefit, and side effects of prescribed medications. Barriers to medication compliance addressed. All patient questions answered. Pt voiced understanding. 5.  Reviewed prior labs and health maintenance  6. Continue current medications, diet and exercise. Completed Refills   Requested Prescriptions     Signed Prescriptions Disp Refills    amLODIPine (NORVASC) 10 MG tablet 90 tablet 1     Sig: Take 1 tablet by mouth daily         Return in about 3 months (around 4/26/2021) for check up.

## 2021-01-26 NOTE — PATIENT INSTRUCTIONS
SURVEY:    You may be receiving a survey from [employer] regarding your visit today. Please complete the survey to enable us to provide the highest quality of care to you and your family. If you cannot score us a very good on any question, please call the office to discuss how we could have made your experience a very good one. Thank you.

## 2021-02-03 ENCOUNTER — OFFICE VISIT (OUTPATIENT)
Dept: ONCOLOGY | Age: 61
End: 2021-02-03

## 2021-02-03 VITALS
SYSTOLIC BLOOD PRESSURE: 140 MMHG | RESPIRATION RATE: 18 BRPM | BODY MASS INDEX: 28.37 KG/M2 | DIASTOLIC BLOOD PRESSURE: 100 MMHG | WEIGHT: 233 LBS | TEMPERATURE: 98.5 F | HEART RATE: 70 BPM | HEIGHT: 76 IN

## 2021-02-03 DIAGNOSIS — I82.452 ACUTE DEEP VEIN THROMBOSIS (DVT) OF LEFT PERONEAL VEIN (HCC): Primary | ICD-10-CM

## 2021-02-03 DIAGNOSIS — D64.9 NORMOCYTIC ANEMIA: Primary | ICD-10-CM

## 2021-02-03 DIAGNOSIS — N17.9 ACUTE KIDNEY INJURY (HCC): ICD-10-CM

## 2021-02-03 DIAGNOSIS — I82.452 ACUTE DEEP VEIN THROMBOSIS (DVT) OF LEFT PERONEAL VEIN (HCC): ICD-10-CM

## 2021-02-03 PROCEDURE — 99214 OFFICE O/P EST MOD 30 MIN: CPT | Performed by: INTERNAL MEDICINE

## 2021-02-08 ENCOUNTER — HOSPITAL ENCOUNTER (OUTPATIENT)
Dept: PREADMISSION TESTING | Age: 61
Setting detail: SPECIMEN
Discharge: HOME | End: 2021-02-12

## 2021-02-08 DIAGNOSIS — Z01.818 PREOPERATIVE TESTING: ICD-10-CM

## 2021-02-08 DIAGNOSIS — Z01.818 PREOPERATIVE TESTING: Primary | ICD-10-CM

## 2021-02-08 PROCEDURE — U0003 INFECTIOUS AGENT DETECTION BY NUCLEIC ACID (DNA OR RNA); SEVERE ACUTE RESPIRATORY SYNDROME CORONAVIRUS 2 (SARS-COV-2) (CORONAVIRUS DISEASE [COVID-19]), AMPLIFIED PROBE TECHNIQUE, MAKING USE OF HIGH THROUGHPUT TECHNOLOGIES AS DESCRIBED BY CMS-2020-01-R: HCPCS

## 2021-02-08 PROCEDURE — U0005 INFEC AGEN DETEC AMPLI PROBE: HCPCS

## 2021-02-08 PROCEDURE — C9803 HOPD COVID-19 SPEC COLLECT: HCPCS

## 2021-02-09 LAB
SARS-COV-2, RAPID: NORMAL
SARS-COV-2: NORMAL
SARS-COV-2: NOT DETECTED
SOURCE: NORMAL

## 2021-02-12 ENCOUNTER — HOSPITAL ENCOUNTER (OUTPATIENT)
Age: 61
Setting detail: OUTPATIENT SURGERY
Discharge: HOME | End: 2021-02-12
Attending: UROLOGY | Admitting: UROLOGY

## 2021-02-12 VITALS
RESPIRATION RATE: 16 BRPM | HEIGHT: 76 IN | HEART RATE: 73 BPM | BODY MASS INDEX: 28.37 KG/M2 | WEIGHT: 233 LBS | OXYGEN SATURATION: 100 % | DIASTOLIC BLOOD PRESSURE: 96 MMHG | TEMPERATURE: 98.6 F | SYSTOLIC BLOOD PRESSURE: 148 MMHG

## 2021-02-12 PROCEDURE — 7100000040 HC SPAR PHASE II RECOVERY - FIRST 15 MIN: Performed by: UROLOGY

## 2021-02-12 PROCEDURE — 2580000003 HC RX 258: Performed by: UROLOGY

## 2021-02-12 PROCEDURE — 3600000013 HC SURGERY LEVEL 3 ADDTL 15MIN: Performed by: UROLOGY

## 2021-02-12 PROCEDURE — 3600000003 HC SURGERY LEVEL 3 BASE: Performed by: UROLOGY

## 2021-02-12 PROCEDURE — 7100000041 HC SPAR PHASE II RECOVERY - ADDTL 15 MIN: Performed by: UROLOGY

## 2021-02-12 PROCEDURE — 2500000003 HC RX 250 WO HCPCS: Performed by: UROLOGY

## 2021-02-12 PROCEDURE — 2709999900 HC NON-CHARGEABLE SUPPLY: Performed by: UROLOGY

## 2021-02-12 PROCEDURE — 6370000000 HC RX 637 (ALT 250 FOR IP): Performed by: UROLOGY

## 2021-02-12 RX ORDER — LIDOCAINE HYDROCHLORIDE 20 MG/ML
JELLY TOPICAL PRN
Status: DISCONTINUED | OUTPATIENT
Start: 2021-02-12 | End: 2021-02-12 | Stop reason: ALTCHOICE

## 2021-02-12 RX ORDER — MAGNESIUM HYDROXIDE 1200 MG/15ML
LIQUID ORAL CONTINUOUS PRN
Status: COMPLETED | OUTPATIENT
Start: 2021-02-12 | End: 2021-02-12

## 2021-02-12 RX ORDER — WATER 1000 ML/1000ML
INJECTION, SOLUTION INTRAVENOUS PRN
Status: DISCONTINUED | OUTPATIENT
Start: 2021-02-12 | End: 2021-02-12 | Stop reason: ALTCHOICE

## 2021-02-12 RX ADMIN — METOPROLOL TARTRATE 25 MG: 25 TABLET ORAL at 15:01

## 2021-02-12 ASSESSMENT — PAIN DESCRIPTION - DESCRIPTORS: DESCRIPTORS: ACHING;BURNING

## 2021-02-12 ASSESSMENT — PAIN SCALES - GENERAL
PAINLEVEL_OUTOF10: 5
PAINLEVEL_OUTOF10: 0

## 2021-02-12 ASSESSMENT — PAIN DESCRIPTION - PAIN TYPE: TYPE: SURGICAL PAIN

## 2021-02-12 NOTE — H&P
Pre-op History and Physical  [id number] Lala PA-C    Patient:  Sunshine Marlow  MRN: [id number]  YOB: 1960    HISTORY OF PRESENT ILLNESS:     The patient is a 59 y.o. male who presents with casas catheter. Had retention for 8L in December. Here for urodynamics and cytosocpy. Patient's old records, notes and chart reviewed and summarized above. [address] Lala PA-C independently reviewed the images and verified the radiology reports from:    No results found. Past Medical History:    Past Medical History:   Diagnosis Date    [facility]ICOLE [facility]) [facility]     DVT (deep vein thrombosis) in pregnancy     Family history of thyroid disease     History of pneumonia     Hx of blood clots     Hypertension     Pulmonary embolism ([facility]     \"Approximately 8 years ago. \"     Wellness examination     pcp=Farhad Levy office-last visit jan 2021   [facility] Wellness examination     hematology-Dr Tobin-last visit jan 2021   [facility] Wellness examination     nephrology-Dr Villafana-last visit jan 2021       Past Surgical History:    Past Surgical History:   Procedure Laterality Date    CHOLECYSTECTOMY      KNEE SURGERY      When pt was early 17's.  TONSILLECTOMY      As a child. Medications Prior to Admission:    Prior to Admission medications    Medication Sig Start Date End Date Taking?  Authorizing Provider   rivaroxaban (XARELTO) 20 MG TABS tablet Take 1 tablet by mouth daily (with breakfast) 2/3/21   Abigail Avina MD   amLODIPine (NORVASC) 10 MG tablet Take 1 tablet by mouth daily 1/26/21   SAHRON Stratton CNP   tamsulosin (FLOMAX) 0.4 MG capsule Take 1 capsule by mouth daily 1/21/21   Snow EdwardschSHARON - CNP   Multiple Vitamins-Minerals (THERAPEUTIC MULTIVITAMIN-MINERALS) tablet Take 1 tablet by mouth daily    Historical Provider, MD   Ferrous Gluconate (IRON 27 PO) Take by mouth daily    Historical Provider, MD       Allergies:  Patient has no known allergies. Social History:    Social History     Socioeconomic History    Marital status:      Spouse name: Not on file    Number of children: Not on file    Years of education: Not on file    Highest education level: Not on file   Occupational History    Not on file   Social Needs    Financial resource strain: Not on file    Food insecurity     Worry: Not on file     Inability: Not on file    Transportation needs     Medical: Not on file     Non-medical: Not on file   Tobacco Use    Smoking status: Never Smoker    Smokeless tobacco: Never Used   Substance and Sexual Activity    Alcohol use: Not Currently    Drug use: Not Currently    Sexual activity: Not on file   Lifestyle    Physical activity     Days per week: Not on file     Minutes per session: Not on file    Stress: Not on file   Relationships    Social connections     Talks on phone: Not on file     Gets together: Not on file     Attends Cheondo[employer] service: Not on file     Active member of club or organization: Not on file     Attends meetings of clubs or organizations: Not on file     Relationship status: Not on file    Intimate partner violence     Fear of current or ex partner: Not on file     Emotionally abused: Not on file     Physically abused: Not on file     Forced sexual activity: Not on file   Other Topics Concern    Not on file   Social History Narrative    Not on file       Family History:    Family History   Problem Relation Age of Onset    Thyroid Disease Mother     Arthritis Mother     Cirrhosis Father         Genetic Cirrhosis    Pacemaker Father        REVIEW OF SYSTEMS:  Constitutional: negative  Eyes: negative  Respiratory: negative  Cardiovascular: negative  Gastrointestinal: negative  Genitourinary: no acute issues  Musculoskeletal: negative  Skin: negative   Neurological: negative  Hematological/Lymphatic: negative  Psychological: negative    PHYSICAL EXAM:    No data found.   Constitutional: Patient in

## 2021-02-13 PROBLEM — D64.9 NORMOCYTIC ANEMIA: Status: ACTIVE | Noted: 2021-02-13

## 2021-02-14 NOTE — PROGRESS NOTES
Chief Complaint   Patient presents with    Follow-up     Inpt. Stay at [facility]. V's anemia CKD     DIAGNOSIS:       1. Anemia: Most likely anemia of chronic disease also renal disease  2. Kidney injury: Evaluated by nephrology and received hemodialysis  3. Bilateral hydronephrosis  4. Bladder obstruction  5. History of PE in the past     CURRENT THERAPY:         Xarelto  Observation for anemia. Plan for Aranesp for Hb below 10. BRIEF CASE HISTORY:      Amara Yates is a 59 y.o.  male who is admitted to the hospital for flank/abdominal pain. Patient also has decreased urine output. Patient had elevated creatinine at 7.8. He was seen by nephrology and started on hemodialysis. Patient has history of PE remotely and states he is  He denies any chest, drenching night sweats or fever chills. He denies any alcohol or tobacco use. Also his hemoglobin noted to be 10.6 therefore hematology consulted for evaluation of his anemia. His WBC 12.3, platelets 720. INTERIM HISTORY:   The patient is seen for follow up after recent hospitalization. Clinically stable. No active bleeding. She has weakness and fatigue. Continues follow up with nephrology. Continues Xarelto. Tolerated well. Past Medical History:   has a past medical history of NICOLE (acute kidney injury) ([address] Family history of thyroid disease, History of pneumonia, Hx of blood clots, Hypertension, Pulmonary embolism ([address] Wellness examination, Wellness examination, and Wellness examination. Past Surgical History:   has a past surgical history that includes Cholecystectomy; knee surgery; Tonsillectomy; and Cystoscopy. Medications:    Prior to Admission medications    Medication Sig Start Date End Date Taking?  Authorizing Provider   rivaroxaban (XARELTO) 20 MG TABS tablet Take 1 tablet by mouth daily (with breakfast) 2/3/21  Yes Jeison Marie MD   amLODIPine (NORVASC) 10 MG tablet Take 1 tablet by mouth daily 1/26/21  Yes Farhad LIMA SHARON Patino CNP   tamsulosin (FLOMAX) 0.4 MG capsule Take 1 capsule by mouth daily 1/21/21  Yes SHARON Toledo CNP   Multiple Vitamins-Minerals (THERAPEUTIC MULTIVITAMIN-MINERALS) tablet Take 1 tablet by mouth daily   Yes Historical Provider, MD   Ferrous Gluconate (IRON 27 PO) Take by mouth daily   Yes Historical Provider, MD     Current Outpatient Medications   Medication Sig Dispense Refill    rivaroxaban (XARELTO) 20 MG TABS tablet Take 1 tablet by mouth daily (with breakfast) 30 tablet 5    amLODIPine (NORVASC) 10 MG tablet Take 1 tablet by mouth daily 90 tablet 1    tamsulosin (FLOMAX) 0.4 MG capsule Take 1 capsule by mouth daily 30 capsule 0    Multiple Vitamins-Minerals (THERAPEUTIC MULTIVITAMIN-MINERALS) tablet Take 1 tablet by mouth daily      Ferrous Gluconate (IRON 27 PO) Take by mouth daily       No current facility-administered medications for this visit. Allergies:  Patient has no known allergies. Social History:   reports that he has never smoked. He has never used smokeless tobacco. He reports previous alcohol use. He reports previous drug use. Family History: family history includes Arthritis in his mother; Cirrhosis in his father; Pacemaker in his father; Thyroid Disease in his mother. REVIEW OF SYSTEMS:    Constitutional: No fever or chills. No night sweats, no weight loss   Eyes: No eye discharge, double vision, or eye pain   HEENT: negative for sore mouth, sore throat, hoarseness and voice change   Respiratory: negative for cough , sputum, dyspnea, wheezing, hemoptysis, chest pain   Cardiovascular: negative for chest pain, dyspnea, palpitations, orthopnea, PND   Gastrointestinal: negative for nausea, vomiting, diarrhea, constipation, abdominal pain, Dysphagia, hematemesis and hematochezia   Genitourinary: negative for frequency, dysuria, nocturia, urinary incontinence, and hematuria   Integument: negative for rash, skin lesions, bruises. Hematologic/Lymphatic: negative for easy bruising, bleeding, lymphadenopathy, or petechiae   Endocrine: negative for heat or cold intolerance,weight changes, change in bowel habits and hair loss   Musculoskeletal: negative for myalgias, arthralgias, pain, joint swelling,and bone pain   Neurological: negative for headaches, dizziness, seizures, weakness, numbness    PHYSICAL EXAM:      BP (!) 140/100 (Site: Right Upper Arm, Position: Sitting, Cuff Size: Medium Adult)   Pulse 70   Temp 98.5 °F (36.9 °C) (Temporal)   Resp 18   Ht 6' 4\" (1.93 m)   Wt 233 lb (105.7 kg)   BMI 28.36 kg/m²    Temp (24hrs), Av.8 °F (37.1 °C), Min:97.1 °F (36.2 °C), Max:99.7 °F (37.6 °C)    General appearance - well appearing, no in pain or distress   Mental status - alert and cooperative   Eyes - pupils equal and reactive, extraocular eye movements intact   Ears - bilateral TM's and external ear canals normal   Mouth - mucous membranes moist, pharynx normal without lesions   Neck - supple, no significant adenopathy   Lymphatics - no palpable lymphadenopathy, no hepatosplenomegaly   Chest - clear to auscultation, no wheezes, rales or rhonchi, symmetric air entry   Heart - normal rate, regular rhythm, normal S1, S2, no murmurs  Abdomen - soft, nontender, nondistended, no masses or organomegaly   Neurological - alert, oriented, normal speech, no focal findings or movement disorder noted   Musculoskeletal - no joint tenderness, deformity or swelling   Extremities - peripheral pulses normal, no pedal edema, no clubbing or cyanosis   Skin - normal coloration and turgor, no rashes, no suspicious skin lesions noted ,    DATA:    Lab Results   Component Value Date    WBC 8.2 2021    HGB 12.9 (L) 2021    HCT 40.3 (L) 2021    MCV 93.3 2021     2021       Chemistry        Component Value Date/Time     2021 1009    K 4.1 2021 1009     2021 1009    CO2 28 2021 1009    BUN 34 (H) 01/25/2021 1009    CREATININE 2.30 (H) 01/25/2021 1009        Component Value Date/Time    CALCIUM 9.5 01/25/2021 1009    ALKPHOS 73 12/20/2020 0607    AST 7 12/20/2020 0607    ALT 10 12/20/2020 0607    BILITOT 0.37 12/20/2020 0607            IMPRESSION:   6. Anemia: Most likely anemia of chronic disease also renal disease  7. Kidney injury: Evaluated by nephrology and received hemodialysis  8. Bilateral hydronephrosis  9. Bladder obstruction  10. History of PE in the past    RECOMMENDATIONS:  1. I reviewed the laboratory data, imaging studies, diagnosis, prognosis and treatment options with patient  2. His anemia appears most likely multifactorial from chronic disease as well as renal disease  3. Repeated  Labs showed Hb 12. 8.we will monitor labs. Will use Aranesp for Hb below 10.  4. Continues follow up with nephrology. 5. RV 4-6 months with labs,  6. [facility]                            This note is created with the assistance of a speech recognition program.  While intending to generate a document that actually reflects the content of the visit, the document can still have some errors including those of syntax and sound a like substitutions which may escape proof reading. It such instances, actual meaning can be extrapolated by contextual diversion.

## 2021-02-15 ENCOUNTER — HOSPITAL ENCOUNTER (OUTPATIENT)
Age: 61
Discharge: HOME | End: 2021-02-15

## 2021-02-15 DIAGNOSIS — I10 ESSENTIAL (PRIMARY) HYPERTENSION: ICD-10-CM

## 2021-02-15 DIAGNOSIS — N17.9 ACUTE KIDNEY INJURY (HCC): ICD-10-CM

## 2021-02-15 LAB
ANION GAP SERPL CALCULATED.3IONS-SCNC: 8 MMOL/L (ref 9–17)
BUN BLDV-MCNC: 32 MG/DL (ref 8–23)
BUN/CREAT BLD: 15 (ref 9–20)
CALCIUM SERPL-MCNC: 9.5 MG/DL (ref 8.6–10.4)
CHLORIDE BLD-SCNC: 101 MMOL/L (ref 98–107)
CHOLESTEROL/HDL RATIO: 4.6
CHOLESTEROL: 208 MG/DL
CO2: 26 MMOL/L (ref 20–31)
CREAT SERPL-MCNC: 2.14 MG/DL (ref 0.7–1.2)
CREATININE URINE: 73.4 MG/DL (ref 39–259)
GFR AFRICAN AMERICAN: 38 ML/MIN
GFR NON-AFRICAN AMERICAN: 31 ML/MIN
GFR SERPL CREATININE-BSD FRML MDRD: ABNORMAL ML/MIN/{1.73_M2}
GFR SERPL CREATININE-BSD FRML MDRD: ABNORMAL ML/MIN/{1.73_M2}
GLUCOSE BLD-MCNC: 83 MG/DL (ref 70–99)
HDLC SERPL-MCNC: 45 MG/DL
LDL CHOLESTEROL: 139 MG/DL (ref 0–130)
POTASSIUM SERPL-SCNC: 3.9 MMOL/L (ref 3.7–5.3)
SODIUM BLD-SCNC: 135 MMOL/L (ref 135–144)
TOTAL PROTEIN, URINE: 75 MG/DL
TRIGL SERPL-MCNC: 121 MG/DL
VLDLC SERPL CALC-MCNC: ABNORMAL MG/DL (ref 1–30)

## 2021-02-15 PROCEDURE — 80061 LIPID PANEL: CPT

## 2021-02-15 PROCEDURE — 80048 BASIC METABOLIC PNL TOTAL CA: CPT

## 2021-02-15 PROCEDURE — 82570 ASSAY OF URINE CREATININE: CPT

## 2021-02-15 PROCEDURE — 84156 ASSAY OF PROTEIN URINE: CPT

## 2021-02-15 PROCEDURE — 36415 COLL VENOUS BLD VENIPUNCTURE: CPT

## 2021-02-17 ENCOUNTER — TELEPHONE (OUTPATIENT)
Dept: PRIMARY CARE CLINIC | Age: 61
End: 2021-02-17

## 2021-02-17 RX ORDER — ASPIRIN 81 MG/1
81 TABLET ORAL DAILY
Qty: 30 TABLET | Refills: 5 | Status: SHIPPED | OUTPATIENT
Start: 2021-02-17

## 2021-02-17 RX ORDER — ATORVASTATIN CALCIUM 20 MG/1
20 TABLET, FILM COATED ORAL DAILY
Qty: 30 TABLET | Refills: 5 | Status: SHIPPED | OUTPATIENT
Start: 2021-02-17 | End: 2021-10-28 | Stop reason: SINTOL

## 2021-02-17 NOTE — TELEPHONE ENCOUNTER
----- Message from SHARON Cazares CNP sent at 2/17/2021  8:24 AM EST -----  Please notify patient that cholesterol is elevated. I would recommend starting cholesterol medication and baby aspirin to help prevent a heart attack or stroke. Will send prescription to the pharmacy. If he has any questions please let me know. Thank you.

## 2021-02-18 NOTE — OP NOTE
Urodynamic Evaluation (2/18/21)  Pre-operative Diagnosis: atonic bladder    Post-operative Diagnosis: Same    Procedure: Video Urodynamics     Anesthesia: Local    Surgeons/Assistants: Rosalia Culp    Estimated Blood Loss: less than 50     Complications: None    Specimens: Was Not Obtained    INDICATIONS FOR THE PROCEDURE:  The patient is a Male with history of urinary retention (8000 ml). He has not been on anticholinergic. The patient is here for diagnostic evaluation. NARRATIVE SUMMARY OF PROCEDURE:  The patient was identified in the pre operative area and after a thorough informed consent was obtained, the patient was moved back to the operative suite. In the room the patient had urodynamics catheters placed in the bladder and rectum in the usual sterile fashion. EMG electrodes were placed on the skin. The patient was then positioned on the urodynamics commode in sitting position. We started filling the bladder with cystograffin contrast at a rate of 60 cc / minute. Using fluoroscopy we were able to visualize the bladder filling. Straight catheterization for Postvoid Residual: 400 ml  Uroflow Study:  Amount Voided: 0 mL  Time to Max Flow: 0 sec  Maximum Flow Rate: 0 mL/sec  Average Flow Rate: 0 mL/sec    CMG with Voiding Pressure Study with intraabdominal probe:  First Sensation: 200 mL  First Urge: 260 mL  Capacity: 280 mL  Leak: @ 260 ml  Compliant bladder? - yes  Max detrusor pressure  < 40 cm H2O  Leaking with Valsalva/coughing? Yes    Interpretation: bladder may retain some function    Surface EMG Normal: yes    Cystoscopy Operative Note:  Surgeon:  Rosalia Culp  2/18/21  Anesthesia: Urethral 2% Xylocaine  Position: Dorsal Lithotomy  Findings:   The patient was prepped and draped in the usual sterile fashion. The flexible cystoscope was advanced through the urethra and into the bladder.   The bladder was thoroughly inspected and the following was noted:  Residual Urine: moderate  Urethra: unermarkable  Bladder: No tumors or CIS noted. No bladder diverticulum. There was severe trabeculation noted. Ureters: Clear efflux from both ureters. Orifices with normal configuration and location. The cystoscope was removed. The patient tolerated the procedure well. Pt unable to void after procedure. Catheter replaced.     Plan  Follow up for [facility]IC teaching

## 2021-02-22 ENCOUNTER — HOSPITAL ENCOUNTER (OUTPATIENT)
Age: 61
Discharge: HOME | End: 2021-02-22

## 2021-02-22 DIAGNOSIS — N17.9 ACUTE KIDNEY INJURY (HCC): ICD-10-CM

## 2021-02-22 LAB
ANION GAP SERPL CALCULATED.3IONS-SCNC: 11 MMOL/L (ref 9–17)
BUN BLDV-MCNC: 37 MG/DL (ref 8–23)
BUN/CREAT BLD: 17 (ref 9–20)
CALCIUM SERPL-MCNC: 9.6 MG/DL (ref 8.6–10.4)
CHLORIDE BLD-SCNC: 105 MMOL/L (ref 98–107)
CO2: 23 MMOL/L (ref 20–31)
CREAT SERPL-MCNC: 2.22 MG/DL (ref 0.7–1.2)
GFR AFRICAN AMERICAN: 36 ML/MIN
GFR NON-AFRICAN AMERICAN: 30 ML/MIN
GFR SERPL CREATININE-BSD FRML MDRD: ABNORMAL ML/MIN/{1.73_M2}
GFR SERPL CREATININE-BSD FRML MDRD: ABNORMAL ML/MIN/{1.73_M2}
GLUCOSE BLD-MCNC: 120 MG/DL (ref 70–99)
POTASSIUM SERPL-SCNC: 4.1 MMOL/L (ref 3.7–5.3)
SODIUM BLD-SCNC: 139 MMOL/L (ref 135–144)

## 2021-02-22 PROCEDURE — 36415 COLL VENOUS BLD VENIPUNCTURE: CPT

## 2021-02-22 PROCEDURE — 80048 BASIC METABOLIC PNL TOTAL CA: CPT

## 2021-02-26 ENCOUNTER — HOSPITAL ENCOUNTER (OUTPATIENT)
Age: 61
Discharge: HOME | End: 2021-02-26

## 2021-02-26 LAB
ANION GAP SERPL CALCULATED.3IONS-SCNC: 9 MMOL/L (ref 9–17)
BUN BLDV-MCNC: 35 MG/DL (ref 8–23)
BUN/CREAT BLD: 15 (ref 9–20)
CALCIUM SERPL-MCNC: 9 MG/DL (ref 8.6–10.4)
CHLORIDE BLD-SCNC: 102 MMOL/L (ref 98–107)
CO2: 27 MMOL/L (ref 20–31)
CREAT SERPL-MCNC: 2.32 MG/DL (ref 0.7–1.2)
GFR AFRICAN AMERICAN: 35 ML/MIN
GFR NON-AFRICAN AMERICAN: 29 ML/MIN
GFR SERPL CREATININE-BSD FRML MDRD: ABNORMAL ML/MIN/{1.73_M2}
GFR SERPL CREATININE-BSD FRML MDRD: ABNORMAL ML/MIN/{1.73_M2}
GLUCOSE BLD-MCNC: 111 MG/DL (ref 70–99)
POTASSIUM SERPL-SCNC: 4.2 MMOL/L (ref 3.7–5.3)
SODIUM BLD-SCNC: 138 MMOL/L (ref 135–144)

## 2021-02-26 PROCEDURE — 80048 BASIC METABOLIC PNL TOTAL CA: CPT

## 2021-02-26 PROCEDURE — 36415 COLL VENOUS BLD VENIPUNCTURE: CPT

## 2021-04-01 ENCOUNTER — HOSPITAL ENCOUNTER (OUTPATIENT)
Dept: ULTRASOUND IMAGING | Age: 61
Discharge: HOME | End: 2021-04-03

## 2021-04-01 DIAGNOSIS — N40.1 BENIGN PROSTATIC HYPERPLASIA WITH URINARY RETENTION: ICD-10-CM

## 2021-04-01 DIAGNOSIS — R33.8 BENIGN PROSTATIC HYPERPLASIA WITH URINARY RETENTION: ICD-10-CM

## 2021-04-01 DIAGNOSIS — N32.0 HYDRONEPHROSIS DUE TO OBSTRUCTION OF BLADDER: ICD-10-CM

## 2021-04-01 DIAGNOSIS — N13.30 HYDRONEPHROSIS DUE TO OBSTRUCTION OF BLADDER: ICD-10-CM

## 2021-04-01 PROCEDURE — 76770 US EXAM ABDO BACK WALL COMP: CPT

## 2021-04-09 ENCOUNTER — OFFICE VISIT (OUTPATIENT)
Dept: UROLOGY | Age: 61
End: 2021-04-09

## 2021-04-09 VITALS
TEMPERATURE: 97.2 F | DIASTOLIC BLOOD PRESSURE: 70 MMHG | WEIGHT: 234 LBS | HEART RATE: 77 BPM | HEIGHT: 76 IN | SYSTOLIC BLOOD PRESSURE: 120 MMHG | BODY MASS INDEX: 28.49 KG/M2

## 2021-04-09 DIAGNOSIS — N40.1 BENIGN PROSTATIC HYPERPLASIA WITH URINARY RETENTION: ICD-10-CM

## 2021-04-09 DIAGNOSIS — N13.30 HYDRONEPHROSIS DUE TO OBSTRUCTION OF BLADDER: Primary | ICD-10-CM

## 2021-04-09 DIAGNOSIS — R33.8 BENIGN PROSTATIC HYPERPLASIA WITH URINARY RETENTION: ICD-10-CM

## 2021-04-09 DIAGNOSIS — N32.0 HYDRONEPHROSIS DUE TO OBSTRUCTION OF BLADDER: Primary | ICD-10-CM

## 2021-04-09 DIAGNOSIS — R33.9 URINARY RETENTION: ICD-10-CM

## 2021-04-09 PROCEDURE — 99214 OFFICE O/P EST MOD 30 MIN: CPT | Performed by: UROLOGY

## 2021-04-09 RX ORDER — TAMSULOSIN HYDROCHLORIDE 0.4 MG/1
0.4 CAPSULE ORAL DAILY
Qty: 30 CAPSULE | Refills: 6 | Status: SHIPPED | OUTPATIENT
Start: 2021-04-09 | End: 2022-11-01 | Stop reason: ALTCHOICE

## 2021-04-09 NOTE — PROGRESS NOTES
Maggie Peng, Gera Colby, Maverick Casillas, Rusty Mann. [facility] Consultation / New Patient Visit    Patient:  Ashok Purcell  YOB: 1960  Date: 4/9/2021  Consult requested from Beth 15 for purpose of evaluation of Bladder obstruction. HISTORY OF PRESENT ILLNESS:   The patient is a 59 y.o. male who presents today for follow-up for the following problem(s): High volume urinary retention  Overall the problem(s) : are worsening. Associated Symptoms: No dysuria, gross hematuria. Pain Severity: Pain Score:   0 - No pain0    4/9/21  Presents after work up of high volume urinary retention. He had cysto UDS which did not show an obstructive prostate. He has poor function of bladder, and has indwelling casas. He needs to set up a home supply for straight catheters, as he has no insurance. RJ reviewed - BL hydronephrosis, which is residual from his high volume retention. 1/8/21  Presents after high volume retention of 8 L. Has catheter in place. 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 Off dialysis, and appears to have residual CKD. Summary of old records:   (Patient's old records, notes and chart reviewed and summarized above.)    Last several PSA's:  No results found for: PSA    Last total testosterone:  No results found for: TESTOSTERONE    Urinalysis today:  No results found for this visit on 04/09/21.       Last BUN and creatinine:  Lab Results   Component Value Date    BUN 35 (H) 02/26/2021     Lab Results   Component Value Date    CREATININE 2.32 (H) 02/26/2021       Imaging Reviewed during this Office Visit:   (results were independently reviewed by physician and radiology report verified)    PAST MEDICAL, FAMILY AND SOCIAL HISTORY:  Past Medical History:   Diagnosis Date    [facility]ICOLE [facility]) [facility]     Family history of thyroid disease     History of pneumonia     Hx of blood clots     BLE DVTs    Hypertension     Pulmonary embolism ([address]     \"Approximately 8 years ago. \"     Wellness examination     pcp=Farhad Levy office-last visit jan 2021   Ella Morgan Wellness examination     hematology-Dr Tobin-last visit jan 2021   Ella Morgan Wellness examination     nephrology- [address] [place] [address] Road visit jan 2021     Past Surgical History:   Procedure Laterality Date    CHOLECYSTECTOMY      CYSTOGRAPHY N/A 2/12/2021    URODYNAMICS performed by Estelita Altamirano MD at [address] [facility]      with videourodynamics    CYSTOSCOPY N/A 2/12/2021    CYSTOSCOPY performed by Estelita Altamirano MD at [place] [address]      When pt was early 17's.  TONSILLECTOMY      As a child. Family History   Problem Relation Age of Onset    Thyroid Disease Mother     Arthritis Mother     Cirrhosis Father         Genetic Cirrhosis    Pacemaker Father      Outpatient Medications Marked as Taking for the 4/9/21 encounter (Office Visit) with Estelita Altamirano MD   Medication Sig Dispense Refill    atorvastatin (LIPITOR) 20 MG tablet Take 1 tablet by mouth daily 30 tablet 5    aspirin EC 81 MG EC tablet Take 1 tablet by mouth daily 30 tablet 5    rivaroxaban (XARELTO) 20 MG TABS tablet Take 1 tablet by mouth daily (with breakfast) 30 tablet 5    amLODIPine (NORVASC) 10 MG tablet Take 1 tablet by mouth daily 90 tablet 1    Multiple Vitamins-Minerals (THERAPEUTIC MULTIVITAMIN-MINERALS) tablet Take 1 tablet by mouth daily      Ferrous Gluconate (IRON 27 PO) Take by mouth daily         Patient has no known allergies.   Social History     Tobacco Use   Smoking Status Never Smoker   Smokeless Tobacco Never Used       Social History     Substance and Sexual Activity   Alcohol Use Not Currently       REVIEW OF SYSTEMS:  Constitutional: negative  Eyes: negative  Respiratory: negative  Cardiovascular: negative  Gastrointestinal: negative  Musculoskeletal: negative  Genitourinary: negative  Skin: negative   Neurological: negative  Hematological/Lymphatic: negative  Psychological: negative    Physical Exam:    This a 59 y.o. male   Vitals:    04/09/21 1004   BP: 120/70   Pulse: 77   Temp: 97.2 °F (36.2 °C)     Constitutional: Patient in no acute distress   Neuro: alert and oriented to person place and time. Psych: Mood and affect normal.  Head: atraumatic normocephalic  Eyes: EOMi  HEENT: neck supple, trachea midline  Lungs: Respiratory effort normal  Cardiovascular:  Normal peripheral pulses  Abdomen: Soft, non-tender, non-distended, No CVA  Bladder: non-tender and not distended. FROMx4, no cyanosis clubbing edema, No calf pain, EPCs in place  Skin: warm and dry  Trevino: lear yellow      Assessment and Plan      1. Hydronephrosis due to obstruction of bladder    2. Urinary retention    3. Benign prostatic hyperplasia with urinary retention           Plan:      No follow-ups on file. Cysto UDS - small prostate, not obstructing, poor function. Flomax  Will exchange catheter today and set up home supply of Straight catheter. He may need financial assistance as he has no insurance.      Most likely has atonic bladder and will need bladder management regimen in future

## 2021-04-12 ENCOUNTER — HOSPITAL ENCOUNTER (OUTPATIENT)
Age: 61
Discharge: HOME | End: 2021-04-12

## 2021-04-12 DIAGNOSIS — N17.9 ACUTE KIDNEY INJURY (HCC): ICD-10-CM

## 2021-04-12 LAB
ABSOLUTE EOS #: 0.15 K/UL (ref 0–0.44)
ABSOLUTE IMMATURE GRANULOCYTE: <0.03 K/UL (ref 0–0.3)
ABSOLUTE LYMPH #: 1.93 K/UL (ref 1.1–3.7)
ABSOLUTE MONO #: 0.82 K/UL (ref 0.1–1.2)
ANION GAP SERPL CALCULATED.3IONS-SCNC: 9 MMOL/L (ref 9–17)
BASOPHILS # BLD: 1 % (ref 0–2)
BASOPHILS ABSOLUTE: 0.05 K/UL (ref 0–0.2)
BUN BLDV-MCNC: 35 MG/DL (ref 8–23)
BUN/CREAT BLD: 15 (ref 9–20)
CALCIUM IONIZED: 1.28 MMOL/L (ref 1.13–1.33)
CALCIUM SERPL-MCNC: 9.7 MG/DL (ref 8.6–10.4)
CHLORIDE BLD-SCNC: 101 MMOL/L (ref 98–107)
CO2: 25 MMOL/L (ref 20–31)
CREAT SERPL-MCNC: 2.26 MG/DL (ref 0.7–1.2)
DIFFERENTIAL TYPE: NORMAL
EOSINOPHILS RELATIVE PERCENT: 2 % (ref 1–4)
GFR AFRICAN AMERICAN: 36 ML/MIN
GFR NON-AFRICAN AMERICAN: 29 ML/MIN
GFR SERPL CREATININE-BSD FRML MDRD: ABNORMAL ML/MIN/{1.73_M2}
GFR SERPL CREATININE-BSD FRML MDRD: ABNORMAL ML/MIN/{1.73_M2}
GLUCOSE BLD-MCNC: 84 MG/DL (ref 70–99)
HCT VFR BLD CALC: 44.4 % (ref 40.7–50.3)
HEMOGLOBIN: 14.5 G/DL (ref 13–17)
IMMATURE GRANULOCYTES: 0 %
LYMPHOCYTES # BLD: 26 % (ref 24–43)
MCH RBC QN AUTO: 28.3 PG (ref 25.2–33.5)
MCHC RBC AUTO-ENTMCNC: 32.7 G/DL (ref 28.4–34.8)
MCV RBC AUTO: 86.5 FL (ref 82.6–102.9)
MONOCYTES # BLD: 11 % (ref 3–12)
NRBC AUTOMATED: 0 PER 100 WBC
PDW BLD-RTO: 13.3 % (ref 11.8–14.4)
PHOSPHORUS: 3 MG/DL (ref 2.5–4.5)
PLATELET # BLD: 421 K/UL (ref 138–453)
PLATELET ESTIMATE: NORMAL
PMV BLD AUTO: 8.7 FL (ref 8.1–13.5)
POTASSIUM SERPL-SCNC: 4.2 MMOL/L (ref 3.7–5.3)
PTH INTACT: 158 PG/ML (ref 15–65)
RBC # BLD: 5.13 M/UL (ref 4.21–5.77)
RBC # BLD: NORMAL 10*6/UL
SEG NEUTROPHILS: 60 % (ref 36–65)
SEGMENTED NEUTROPHILS ABSOLUTE COUNT: 4.55 K/UL (ref 1.5–8.1)
SODIUM BLD-SCNC: 135 MMOL/L (ref 135–144)
VITAMIN D 25-HYDROXY: 34.6 NG/ML (ref 30–100)
WBC # BLD: 7.5 K/UL (ref 3.5–11.3)
WBC # BLD: NORMAL 10*3/UL

## 2021-04-12 PROCEDURE — 36415 COLL VENOUS BLD VENIPUNCTURE: CPT

## 2021-04-12 PROCEDURE — 85025 COMPLETE CBC W/AUTO DIFF WBC: CPT

## 2021-04-12 PROCEDURE — 80048 BASIC METABOLIC PNL TOTAL CA: CPT

## 2021-04-12 PROCEDURE — 82330 ASSAY OF CALCIUM: CPT

## 2021-04-12 PROCEDURE — 82306 VITAMIN D 25 HYDROXY: CPT

## 2021-04-12 PROCEDURE — 84100 ASSAY OF PHOSPHORUS: CPT

## 2021-04-12 PROCEDURE — 83970 ASSAY OF PARATHORMONE: CPT

## 2021-04-28 ENCOUNTER — OFFICE VISIT (OUTPATIENT)
Dept: PRIMARY CARE CLINIC | Age: 61
End: 2021-04-28

## 2021-04-28 VITALS
HEART RATE: 79 BPM | WEIGHT: 237.4 LBS | SYSTOLIC BLOOD PRESSURE: 122 MMHG | BODY MASS INDEX: 28.91 KG/M2 | RESPIRATION RATE: 18 BRPM | DIASTOLIC BLOOD PRESSURE: 86 MMHG | OXYGEN SATURATION: 97 % | TEMPERATURE: 98.8 F | HEIGHT: 76 IN

## 2021-04-28 DIAGNOSIS — E78.5 DYSLIPIDEMIA: ICD-10-CM

## 2021-04-28 DIAGNOSIS — I10 ESSENTIAL HYPERTENSION: ICD-10-CM

## 2021-04-28 DIAGNOSIS — I82.4Y3 DEEP VEIN THROMBOSIS (DVT) OF PROXIMAL VEIN OF BOTH LOWER EXTREMITIES, UNSPECIFIED CHRONICITY (HCC): ICD-10-CM

## 2021-04-28 DIAGNOSIS — N18.9 CHRONIC KIDNEY DISEASE, UNSPECIFIED CKD STAGE: ICD-10-CM

## 2021-04-28 DIAGNOSIS — I10 ESSENTIAL (PRIMARY) HYPERTENSION: Primary | ICD-10-CM

## 2021-04-28 DIAGNOSIS — Z23 NEED FOR VACCINATION: ICD-10-CM

## 2021-04-28 PROCEDURE — 99214 OFFICE O/P EST MOD 30 MIN: CPT | Performed by: NURSE PRACTITIONER

## 2021-04-28 PROCEDURE — 90471 IMMUNIZATION ADMIN: CPT | Performed by: NURSE PRACTITIONER

## 2021-04-28 PROCEDURE — 90715 TDAP VACCINE 7 YRS/> IM: CPT | Performed by: NURSE PRACTITIONER

## 2021-04-28 RX ORDER — ZOSTER VACCINE RECOMBINANT, ADJUVANTED 50 MCG/0.5
0.5 KIT INTRAMUSCULAR SEE ADMIN INSTRUCTIONS
Qty: 0.5 ML | Refills: 0 | Status: SHIPPED | OUTPATIENT
Start: 2021-04-28 | End: 2021-09-10

## 2021-04-28 RX ORDER — ZOSTER VACCINE RECOMBINANT, ADJUVANTED 50 MCG/0.5
0.5 KIT INTRAMUSCULAR SEE ADMIN INSTRUCTIONS
Qty: 0.5 ML | Refills: 0 | Status: SHIPPED | OUTPATIENT
Start: 2021-04-28 | End: 2021-04-28 | Stop reason: SDUPTHER

## 2021-04-28 RX ORDER — ZOSTER VACCINE RECOMBINANT, ADJUVANTED 50 MCG/0.5
0.5 KIT INTRAMUSCULAR SEE ADMIN INSTRUCTIONS
Qty: 0.5 ML | Refills: 0 | Status: SHIPPED | OUTPATIENT
Start: 2021-04-28 | End: 2021-04-28

## 2021-04-28 ASSESSMENT — ENCOUNTER SYMPTOMS
ANAL BLEEDING: 0
NAUSEA: 0
BLOOD IN STOOL: 0
SINUS PRESSURE: 0
CHEST TIGHTNESS: 0
WHEEZING: 0
SORE THROAT: 0
COUGH: 0
TROUBLE SWALLOWING: 0
DIARRHEA: 0
VOMITING: 0
ABDOMINAL PAIN: 0
RHINORRHEA: 0
SHORTNESS OF BREATH: 0

## 2021-04-28 NOTE — PROGRESS NOTES
Name: Bruce Stanley  : 1960         Chief Complaint:     Chief Complaint   Patient presents with    Check-Up     3 month.  Hypertension    Hyperlipidemia       History of Present Illness:      Bruce Stanley is a 59 y.o.  male who presents with Check-Up (3 month. ), Hypertension, and Hyperlipidemia      Nathalie Esaon is here today for a routine office visit. He reports overall he is doing well and is slowly getting back to normal.  He has been getting up and doing more he reports fatigue has improved. He denies any chest pain, shortness of breath dizziness or lightheadedness. CKD-following with nephrology. Had appoint with them 2 days ago. Volumes and renal function has been stable. He does have secondary hyperparathyroidism and they added calcitriol. He is going to be picking the medication up today. DVT- Swelling in right leg continues to improve. He is currently on Xarelto and will follow up with hematology in . He denies any gross hematuria, dark tarry stools or bright red blood per rectum. He reports hematology told him if nothing is going well they will consider discontinuing anticoagulation in . BPH/urinary retention-stable. Patient following with urology. Last appointment with them was on . At that time the replaced his Trevino catheter. The plan is that he is going to have Trevino catheter removed in May and he is going to start self cathing. Hypertension  This is a new problem. Episode onset: While hospitalized in December. The problem has been waxing and waning since onset. The problem is controlled. Associated symptoms include peripheral edema. Pertinent negatives include no chest pain, headaches, neck pain, palpitations or shortness of breath. There are no associated agents to hypertension. Risk factors for coronary artery disease include male gender. Past treatments include calcium channel blockers. There are no compliance problems.   Hypertensive end-organ damage includes kidney disease. There is no history of angina, CAD/MI or heart failure. Identifiable causes of hypertension include chronic renal disease and hyperparathyroidism. There is no history of coarctation of the aorta, sleep apnea or a thyroid problem. Hyperlipidemia  This is a new problem. The current episode started more than 1 month ago. The problem is controlled. Recent lipid tests were reviewed and are variable. Exacerbating diseases include chronic renal disease. He has no history of diabetes, hypothyroidism, liver disease, obesity or nephrotic syndrome. Pertinent negatives include no chest pain or shortness of breath. Current antihyperlipidemic treatment includes statins. Compliance problems include adherence to exercise. Risk factors for coronary artery disease include male sex, hypertension and dyslipidemia. Past Medical History:     Past Medical History:   Diagnosis Date    NICOLE (acute kidney injury) ([facility] [facility]     Family history of thyroid disease     History of pneumonia     Hx of blood clots     BLE DVTs    Hypertension     Pulmonary embolism ([facility] [address]     \"Approximately 8 years ago. \"     Wellness examination     pcp=Farhad Levy office-last visit jan 2021   [facility] Wellness examination     hematology-Dr Tobin-last visit jan 2021   [facility] Wellness examination     nephrology- [address] [place] [address] Road visit jan 2021      Reviewed all health maintenance requirements and ordered appropriate tests  Health Maintenance Due   Topic Date Due    Shingles Vaccine (1 of 2) Never done    Colon cancer screen colonoscopy  Never done       Past Surgical History:     Past Surgical History:   Procedure Laterality Date    CHOLECYSTECTOMY      CYSTOGRAPHY N/A 2/12/2021    URODYNAMICS performed by Robert Cervantes MD at [address] [place] [address]      with videourodynamics    CYSTOSCOPY N/A 2/12/2021    CYSTOSCOPY performed by [facility]ervantes MD at [facility]      When pt was early 20's.  TONSILLECTOMY      As a child. Medications:       Prior to Admission medications    Medication Sig Start Date End Date Taking? Authorizing Provider   zoster recombinant adjuvanted vaccine [facility]) 50 MCG/0.5ML SUSR injection Inject 0.5 mLs into the muscle See Admin Instructions 1 dose now and repeat in 2-6 months 4/28/21 10/25/21 Yes Farhad Patino APRN - CNP   calcitRIOL (ROCALTROL) 0.25 MCG capsule Take 1 capsule by mouth three times a week Monday/Wednesday/Friday 4/26/21 5/26/21 Yes Blanquita Rainey MD   tamsulosin (FLOMAX) 0.4 MG capsule Take 1 capsule by mouth daily Take one capsule daily to facilitate passage of ureteral stone 4/9/21  Yes Estelita Altamirano MD   aspirin EC 81 MG EC tablet Take 1 tablet by mouth daily 2/17/21  Yes Farhad Patino APRN - CNP   rivaroxaban (XARELTO) 20 MG TABS tablet Take 1 tablet by mouth daily (with breakfast) 2/3/21  Yes Bhupinder Llamas MD   amLODIPine (NORVASC) 10 MG tablet Take 1 tablet by mouth daily 1/26/21  Yes Farhad Patino APRN - CNP   Multiple Vitamins-Minerals (THERAPEUTIC MULTIVITAMIN-MINERALS) tablet Take 1 tablet by mouth daily   Yes Historical Provider, MD   Ferrous Gluconate (IRON 27 PO) Take by mouth daily   Yes Historical Provider, MD   furosemide (LASIX) 20 MG tablet Take 1 tablet by mouth daily  Patient not taking: Reported on 4/22/2021 2/22/21 3/24/21  Blanquita Rainey MD   atorvastatin (LIPITOR) 20 MG tablet Take 1 tablet by mouth daily  Patient not taking: Reported on 4/28/2021 2/17/21   Valencia Patino, APRN - CNP        Allergies:       Patient has no known allergies. Social History:     Tobacco:    reports that he has never smoked. He has never used smokeless tobacco.  Alcohol:      reports previous alcohol use. Drug Use:  reports previous drug use.     Family History:     Family History   Problem Relation Age of Onset    Thyroid Disease Mother     Arthritis Mother     Cirrhosis Father         Genetic Cirrhosis    Pacemaker Father        Review of Systems:     Positive and Negative as described in HPI    Review of Systems   Constitutional: Negative for activity change, appetite change, chills, fatigue, fever and unexpected weight change. HENT: Negative for congestion, postnasal drip, rhinorrhea, sinus pressure, sore throat and trouble swallowing. Respiratory: Negative for cough, chest tightness, shortness of breath and wheezing. Cardiovascular: Positive for leg swelling (Right leg improving). Negative for chest pain and palpitations. Gastrointestinal: Negative for abdominal pain, anal bleeding, blood in stool, diarrhea, nausea and vomiting. Genitourinary: Negative for flank pain, hematuria, penile swelling and testicular pain. Trevino catheter in place   Musculoskeletal: Negative for arthralgias, gait problem and neck pain. Skin: Negative for rash and wound. Neurological: Negative for dizziness, syncope, weakness, light-headedness and headaches. Hematological: Does not bruise/bleed easily. Psychiatric/Behavioral: Negative for agitation, decreased concentration and sleep disturbance. The patient is not nervous/anxious. Physical Exam:   Vitals:  /86 (Site: Left Upper Arm, Position: Sitting, Cuff Size: Large Adult)   Pulse 79   Temp 98.8 °F (37.1 °C) (Temporal)   Resp 18   Ht 6' 4\" (1.93 m)   Wt 237 lb 6.4 oz (107.7 kg)   SpO2 97%   BMI 28.90 kg/m²     Physical Exam  Vitals signs and nursing note reviewed. Constitutional:       General: He is not in acute distress. Appearance: Normal appearance. He is not ill-appearing, toxic-appearing or diaphoretic. HENT:      Head: Normocephalic and atraumatic. Right Ear: There is no impacted cerumen. Left Ear: There is no impacted cerumen. Nose: No congestion or rhinorrhea. Mouth/Throat:      Mouth: Mucous membranes are moist.      Pharynx: No oropharyngeal exudate or posterior oropharyngeal erythema.    Eyes: General:         Right eye: No discharge. Left eye: No discharge. Conjunctiva/sclera: Conjunctivae normal.   Neck:      Musculoskeletal: Normal range of motion and neck supple. Cardiovascular:      Rate and Rhythm: Normal rate and regular rhythm. Heart sounds: No murmur. Pulmonary:      Effort: Pulmonary effort is normal.      Breath sounds: Normal breath sounds. No wheezing, rhonchi or rales. Abdominal:      General: There is no distension. Palpations: Abdomen is soft. Tenderness: There is no abdominal tenderness. Hernia: No hernia is present. Genitourinary:     Comments: cathether in place with yellow urine  Musculoskeletal:      Right lower leg: Edema (Trace) present. Left lower leg: No edema. Lymphadenopathy:      Cervical: No cervical adenopathy. Skin:     Findings: No bruising or erythema. Neurological:      General: No focal deficit present. Mental Status: He is alert and oriented to person, place, and time. Motor: No weakness. Psychiatric:         Mood and Affect: Mood normal.         Behavior: Behavior normal.         Data:     Lab Results   Component Value Date     04/12/2021    K 4.2 04/12/2021     04/12/2021    CO2 25 04/12/2021    BUN 35 04/12/2021    CREATININE 2.26 04/12/2021    GLUCOSE 84 04/12/2021    PROT 6.2 12/20/2020    LABALBU 3.1 12/20/2020    BILITOT 0.37 12/20/2020    ALKPHOS 73 12/20/2020    AST 7 12/20/2020    ALT 10 12/20/2020     Lab Results   Component Value Date    WBC 7.5 04/12/2021    RBC 5.13 04/12/2021    HGB 14.5 04/12/2021    HCT 44.4 04/12/2021    MCV 86.5 04/12/2021    MCH 28.3 04/12/2021    MCHC 32.7 04/12/2021    RDW 13.3 04/12/2021     04/12/2021    MPV 8.7 04/12/2021     No results found for: TSH  Lab Results   Component Value Date    CHOL 208 02/15/2021    HDL 45 02/15/2021       Assessment/Plan:      Diagnosis Orders   1. Essential (primary) hypertension     2. Dyslipidemia     3.  Essential hypertension     4. Chronic kidney disease, unspecified CKD stage     5. Deep vein thrombosis (DVT) of proximal vein of both lower extremities, unspecified chronicity ([facility] [address]     6. Need for vaccination  Tdap (age 6y and older) IM ([address])     Essential hypertensionstable. Continue amlodipine 10 mg daily. Dyslipidemiastable. ASCVD score 12.9 patient was started on baby aspirin and atorvastatin and is tolerating these well. DVTstable. He has been tolerating Xarelto well denies any signs of bleeding. He will follow up with hematology in June. Chronic kidney diseasestable. He will continue to follow with nephrology. BPH with urinary retentionstable. He will continue to follow with urology. Plan is to discontinue Trevino catheter in May and start straight cath. Patient would like to hold off on colorectal cancer screening at this time. Will readdress at next appointment. 1.  Manuel received counseling on the following healthy behaviors: nutrition, exercise and medication adherence  2. Patient given educational materials - see patient instructions  3. Was a self-tracking handout given in paper form or via [employer]? No  If yes, see orders or list here. 4.  Discussed use, benefit, and side effects of prescribed medications. Barriers to medication compliance addressed. All patient questions answered. Pt voiced understanding. 5.  Reviewed prior labs and health maintenance  6. Continue current medications, diet and exercise. Completed Refills   Requested Prescriptions     Signed Prescriptions Disp Refills    zoster recombinant adjuvanted vaccine (SHINGRIX) 50 MCG/0.5ML SUSR injection 0.5 mL 0     Sig: Inject 0.5 mLs into the muscle See Admin Instructions 1 dose now and repeat in 2-6 months         Return in about 6 months (around 10/28/2021).

## 2021-04-28 NOTE — PATIENT INSTRUCTIONS
SURVEY:    You may be receiving a survey from [employer] regarding your visit today. Please complete the survey to enable us to provide the highest quality of care to you and your family. If you cannot score us a very good on any question, please call the office to discuss how we could have made your experience a very good one. Thank you. Patient Education        High Blood Pressure: Care Instructions  Overview     It's normal for blood pressure to go up and down throughout the day. But if it stays up, you have high blood pressure. Another name for high blood pressure is hypertension. Despite what a lot of people think, high blood pressure usually doesn't cause headaches or make you feel dizzy or lightheaded. It usually has no symptoms. But it does increase your risk of stroke, heart attack, and other problems. You and your doctor will talk about your risks of these problems based on your blood pressure. Your doctor will give you a goal for your blood pressure. Your goal will be based on your health and your age. Lifestyle changes, such as eating healthy and being active, are always important to help lower blood pressure. You might also take medicine to reach your blood pressure goal.  Follow-up care is a key part of your treatment and safety. Be sure to make and go to all appointments, and call your doctor if you are having problems. It's also a good idea to know your test results and keep a list of the medicines you take. How can you care for yourself at home? Medical treatment  · If you stop taking your medicine, your blood pressure will go back up. You may take one or more types of medicine to lower your blood pressure. Be safe with medicines. Take your medicine exactly as prescribed. Call your doctor if you think you are having a problem with your medicine. · Talk to your doctor before you start taking aspirin every day.  Aspirin can help certain people lower their risk of a heart attack or [facility] ([facility]). If you have questions about a medical condition or this instruction, always ask your healthcare professional. [place] [address] any warranty or liability for your use of this information.

## 2021-05-14 ENCOUNTER — OFFICE VISIT (OUTPATIENT)
Dept: UROLOGY | Age: 61
End: 2021-05-14

## 2021-05-14 VITALS
BODY MASS INDEX: 29.35 KG/M2 | SYSTOLIC BLOOD PRESSURE: 132 MMHG | DIASTOLIC BLOOD PRESSURE: 89 MMHG | HEART RATE: 74 BPM | HEIGHT: 76 IN | TEMPERATURE: 97.3 F | WEIGHT: 241 LBS

## 2021-05-14 DIAGNOSIS — R33.8 BENIGN PROSTATIC HYPERPLASIA WITH URINARY RETENTION: Primary | ICD-10-CM

## 2021-05-14 DIAGNOSIS — N31.2 ATONIC BLADDER: ICD-10-CM

## 2021-05-14 DIAGNOSIS — N40.1 BENIGN PROSTATIC HYPERPLASIA WITH URINARY RETENTION: Primary | ICD-10-CM

## 2021-05-14 PROCEDURE — 99024 POSTOP FOLLOW-UP VISIT: CPT | Performed by: UROLOGY

## 2021-05-14 NOTE — PROGRESS NOTES
To to undergo [facility] teaching today  Catheter removed  Supplies given  He needs to [place] [employer] [facility] home supplies - information and Rx given at last appointment.

## 2021-06-09 ENCOUNTER — HOSPITAL ENCOUNTER (OUTPATIENT)
Age: 61
Discharge: HOME | End: 2021-06-09

## 2021-06-09 DIAGNOSIS — I82.452 ACUTE DEEP VEIN THROMBOSIS (DVT) OF LEFT PERONEAL VEIN (HCC): ICD-10-CM

## 2021-06-09 LAB
ABSOLUTE EOS #: 0.17 K/UL (ref 0–0.44)
ABSOLUTE IMMATURE GRANULOCYTE: <0.03 K/UL (ref 0–0.3)
ABSOLUTE LYMPH #: 1.94 K/UL (ref 1.1–3.7)
ABSOLUTE MONO #: 0.55 K/UL (ref 0.1–1.2)
BASOPHILS # BLD: 1 % (ref 0–2)
BASOPHILS ABSOLUTE: 0.06 K/UL (ref 0–0.2)
DIFFERENTIAL TYPE: ABNORMAL
EOSINOPHILS RELATIVE PERCENT: 2 % (ref 1–4)
HCT VFR BLD CALC: 43.1 % (ref 40.7–50.3)
HEMOGLOBIN: 14.4 G/DL (ref 13–17)
IMMATURE GRANULOCYTES: 0 %
LYMPHOCYTES # BLD: 24 % (ref 24–43)
MCH RBC QN AUTO: 29.4 PG (ref 25.2–33.5)
MCHC RBC AUTO-ENTMCNC: 33.4 G/DL (ref 28.4–34.8)
MCV RBC AUTO: 88.1 FL (ref 82.6–102.9)
MONOCYTES # BLD: 7 % (ref 3–12)
NRBC AUTOMATED: 0 PER 100 WBC
PDW BLD-RTO: 14 % (ref 11.8–14.4)
PLATELET # BLD: 287 K/UL (ref 138–453)
PLATELET ESTIMATE: ABNORMAL
PMV BLD AUTO: 10.4 FL (ref 8.1–13.5)
RBC # BLD: 4.89 M/UL (ref 4.21–5.77)
RBC # BLD: ABNORMAL 10*6/UL
SEG NEUTROPHILS: 66 % (ref 36–65)
SEGMENTED NEUTROPHILS ABSOLUTE COUNT: 5.53 K/UL (ref 1.5–8.1)
WBC # BLD: 8.3 K/UL (ref 3.5–11.3)
WBC # BLD: ABNORMAL 10*3/UL

## 2021-06-09 PROCEDURE — 36415 COLL VENOUS BLD VENIPUNCTURE: CPT

## 2021-06-09 PROCEDURE — 85025 COMPLETE CBC W/AUTO DIFF WBC: CPT

## 2021-06-23 ENCOUNTER — OFFICE VISIT (OUTPATIENT)
Dept: ONCOLOGY | Age: 61
End: 2021-06-23

## 2021-06-23 VITALS
TEMPERATURE: 98.5 F | WEIGHT: 241 LBS | HEIGHT: 76 IN | RESPIRATION RATE: 18 BRPM | SYSTOLIC BLOOD PRESSURE: 122 MMHG | BODY MASS INDEX: 29.35 KG/M2 | DIASTOLIC BLOOD PRESSURE: 94 MMHG | HEART RATE: 75 BPM

## 2021-06-23 DIAGNOSIS — I82.452 ACUTE DEEP VEIN THROMBOSIS (DVT) OF LEFT PERONEAL VEIN (HCC): ICD-10-CM

## 2021-06-23 DIAGNOSIS — D64.9 NORMOCYTIC ANEMIA: Primary | ICD-10-CM

## 2021-06-23 PROCEDURE — 99211 OFF/OP EST MAY X REQ PHY/QHP: CPT | Performed by: INTERNAL MEDICINE

## 2021-06-23 PROCEDURE — 99214 OFFICE O/P EST MOD 30 MIN: CPT | Performed by: INTERNAL MEDICINE

## 2021-06-23 NOTE — PROGRESS NOTES
4/9/21  Yes Gogo Calix MD   atorvastatin (LIPITOR) 20 MG tablet Take 1 tablet by mouth daily 2/17/21  Yes SHARON Koenig CNP   aspirin EC 81 MG EC tablet Take 1 tablet by mouth daily 2/17/21  Yes SHARON Koenig CNP   rivaroxaban (XARELTO) 20 MG TABS tablet Take 1 tablet by mouth daily (with breakfast) 2/3/21  Yes Zachary Mondragon MD   amLODIPine (NORVASC) 10 MG tablet Take 1 tablet by mouth daily 1/26/21  Yes SHARON Koenig CNP   Multiple Vitamins-Minerals (THERAPEUTIC MULTIVITAMIN-MINERALS) tablet Take 1 tablet by mouth daily   Yes Historical Provider, MD   Ferrous Gluconate (IRON 27 PO) Take by mouth daily   Yes Historical Provider, MD   zoster recombinant adjuvanted vaccine [facility]) 50 MCG/0.5ML SUSR injection Inject 0.5 mLs into the muscle See Admin Instructions 1 dose now and repeat in 2-6 months  Patient not taking: Reported on 6/23/2021 4/28/21 10/25/21  SHARON Flowers CNP   calcitRIOL (ROCALTROL) 0.25 MCG capsule Take 1 capsule by mouth three times a week Monday/Wednesday/Friday 4/26/21 5/26/21  Jessica Mortensen MD   furosemide (LASIX) 20 MG tablet Take 1 tablet by mouth daily  Patient not taking: Reported on 4/22/2021 2/22/21 3/24/21  Jessica Mortensen MD     Current Outpatient Medications   Medication Sig Dispense Refill    tamsulosin (FLOMAX) 0.4 MG capsule Take 1 capsule by mouth daily Take one capsule daily to facilitate passage of ureteral stone 30 capsule 6    atorvastatin (LIPITOR) 20 MG tablet Take 1 tablet by mouth daily 30 tablet 5    aspirin EC 81 MG EC tablet Take 1 tablet by mouth daily 30 tablet 5    rivaroxaban (XARELTO) 20 MG TABS tablet Take 1 tablet by mouth daily (with breakfast) 30 tablet 5    amLODIPine (NORVASC) 10 MG tablet Take 1 tablet by mouth daily 90 tablet 1    Multiple Vitamins-Minerals (THERAPEUTIC MULTIVITAMIN-MINERALS) tablet Take 1 tablet by mouth daily      Ferrous Gluconate (IRON 27 PO) Take by mouth daily      zoster recombinant adjuvanted vaccine [facility]) 50 MCG/0.5ML SUSR injection Inject 0.5 mLs into the muscle See Admin Instructions 1 dose now and repeat in 2-6 months (Patient not taking: Reported on 6/23/2021) 0.5 mL 0    calcitRIOL (ROCALTROL) 0.25 MCG capsule Take 1 capsule by mouth three times a week Monday/Wednesday/Friday 12 capsule 3    furosemide (LASIX) 20 MG tablet Take 1 tablet by mouth daily (Patient not taking: Reported on 4/22/2021) 30 tablet 3     No current facility-administered medications for this visit. Allergies:  Patient has no known allergies. Social History:   reports that he has never smoked. He has never used smokeless tobacco. He reports previous alcohol use. He reports previous drug use. Family History: family history includes Arthritis in his mother; Cirrhosis in his father; Pacemaker in his father; Thyroid Disease in his mother. REVIEW OF SYSTEMS:    Constitutional: No fever or chills. No night sweats, no weight loss   Eyes: No eye discharge, double vision, or eye pain   HEENT: negative for sore mouth, sore throat, hoarseness and voice change   Respiratory: negative for cough , sputum, dyspnea, wheezing, hemoptysis, chest pain   Cardiovascular: negative for chest pain, dyspnea, palpitations, orthopnea, PND   Gastrointestinal: negative for nausea, vomiting, diarrhea, constipation, abdominal pain, Dysphagia, hematemesis and hematochezia   Genitourinary: negative for frequency, dysuria, nocturia, urinary incontinence, and hematuria   Integument: negative for rash, skin lesions, bruises.    Hematologic/Lymphatic: negative for easy bruising, bleeding, lymphadenopathy, or petechiae   Endocrine: negative for heat or cold intolerance,weight changes, change in bowel habits and hair loss   Musculoskeletal: negative for myalgias, arthralgias, pain, joint swelling,and bone pain   Neurological: negative for headaches, dizziness, seizures, weakness, numbness    PHYSICAL EXAM:      BP (!) 122/94 (Site: Left Upper Arm, Position: Sitting, Cuff Size: Medium Adult)   Pulse 75   Temp 98.5 °F (36.9 °C) (Temporal)   Resp 18   Ht 6' 4\" (1.93 m)   Wt 241 lb (109.3 kg)   BMI 29.34 kg/m²    Temp (24hrs), Av.8 °F (37.1 °C), Min:97.1 °F (36.2 °C), Max:99.7 °F (37.6 °C)    General appearance - well appearing, no in pain or distress   Mental status - alert and cooperative   Eyes - pupils equal and reactive, extraocular eye movements intact   Ears - bilateral TM's and external ear canals normal   Mouth - mucous membranes moist, pharynx normal without lesions   Neck - supple, no significant adenopathy   Lymphatics - no palpable lymphadenopathy, no hepatosplenomegaly   Chest - clear to auscultation, no wheezes, rales or rhonchi, symmetric air entry   Heart - normal rate, regular rhythm, normal S1, S2, no murmurs  Abdomen - soft, nontender, nondistended, no masses or organomegaly   Neurological - alert, oriented, normal speech, no focal findings or movement disorder noted   Musculoskeletal - no joint tenderness, deformity or swelling   Extremities - peripheral pulses normal, no pedal edema, no clubbing or cyanosis   Skin - normal coloration and turgor, no rashes, no suspicious skin lesions noted ,    DATA:    Lab Results   Component Value Date    WBC 8.3 2021    HGB 14.4 2021    HCT 43.1 2021    MCV 88.1 2021     2021       Chemistry        Component Value Date/Time     2021 1111    K 4.2 2021 1111     2021 1111    CO2 25 2021 1111    BUN 35 (H) 2021 1111    CREATININE 2.26 (H) 2021 1111        Component Value Date/Time    CALCIUM 9.7 2021 1111    ALKPHOS 73 2020 0607    AST 7 2020 0607    ALT 10 2020 0607    BILITOT 0.37 2020 0607            IMPRESSION:   6. Anemia: Most likely anemia of chronic disease also renal disease  7.  Kidney injury: Evaluated by nephrology and received

## 2021-07-13 ENCOUNTER — HOSPITAL ENCOUNTER (OUTPATIENT)
Age: 61
Discharge: HOME | End: 2021-07-13

## 2021-07-13 DIAGNOSIS — N17.9 ACUTE KIDNEY INJURY (HCC): ICD-10-CM

## 2021-07-13 LAB
ANION GAP SERPL CALCULATED.3IONS-SCNC: 13 MMOL/L (ref 9–17)
BUN BLDV-MCNC: 29 MG/DL (ref 8–23)
BUN/CREAT BLD: 15 (ref 9–20)
CALCIUM IONIZED: 1.27 MMOL/L (ref 1.13–1.33)
CALCIUM SERPL-MCNC: 9.5 MG/DL (ref 8.6–10.4)
CHLORIDE BLD-SCNC: 100 MMOL/L (ref 98–107)
CO2: 24 MMOL/L (ref 20–31)
CREAT SERPL-MCNC: 1.89 MG/DL (ref 0.7–1.2)
GFR AFRICAN AMERICAN: 44 ML/MIN
GFR NON-AFRICAN AMERICAN: 36 ML/MIN
GFR SERPL CREATININE-BSD FRML MDRD: ABNORMAL ML/MIN/{1.73_M2}
GFR SERPL CREATININE-BSD FRML MDRD: ABNORMAL ML/MIN/{1.73_M2}
GLUCOSE BLD-MCNC: 98 MG/DL (ref 70–99)
POTASSIUM SERPL-SCNC: 4.6 MMOL/L (ref 3.7–5.3)
PTH INTACT: 128 PG/ML (ref 15–65)
SODIUM BLD-SCNC: 137 MMOL/L (ref 135–144)

## 2021-07-13 PROCEDURE — 80048 BASIC METABOLIC PNL TOTAL CA: CPT

## 2021-07-13 PROCEDURE — 82330 ASSAY OF CALCIUM: CPT

## 2021-07-13 PROCEDURE — 83970 ASSAY OF PARATHORMONE: CPT

## 2021-07-13 PROCEDURE — 36415 COLL VENOUS BLD VENIPUNCTURE: CPT

## 2021-07-19 DIAGNOSIS — I10 ESSENTIAL (PRIMARY) HYPERTENSION: ICD-10-CM

## 2021-07-19 RX ORDER — AMLODIPINE BESYLATE 10 MG/1
TABLET ORAL
Qty: 90 TABLET | Refills: 0 | Status: SHIPPED | OUTPATIENT
Start: 2021-07-19 | End: 2021-10-28 | Stop reason: SDUPTHER

## 2021-07-19 NOTE — TELEPHONE ENCOUNTER
Health Maintenance   Topic Date Due    Colon cancer screen colonoscopy  Never done    Shingles Vaccine (1 of 2) Never done    HIV screen  04/28/2022 (Originally 2/12/1975)    Flu vaccine (1) 09/01/2021    Lipid screen  02/15/2022    Potassium monitoring  07/13/2022    Creatinine monitoring  07/13/2022    DTaP/Tdap/Td vaccine (2 - Td or Tdap) 04/28/2031    COVID-19 Vaccine  Completed    Hepatitis C screen  Completed    Hepatitis A vaccine  Aged Out    Hepatitis B vaccine  Aged Out    Hib vaccine  Aged Out    Meningococcal (ACWY) vaccine  Aged Out    Pneumococcal 0-64 years Vaccine  Aged Out             (applicable per patient's age: Cancer Screenings, Depression Screening, Fall Risk Screening, Immunizations)    LDL Cholesterol (mg/dL)   Date Value   02/15/2021 139 (H)     AST (U/L)   Date Value   12/20/2020 7     ALT (U/L)   Date Value   12/20/2020 10     BUN (mg/dL)   Date Value   07/13/2021 29 (H)      (goal A1C is < 7)   (goal LDL is <100) need 30-50% reduction from baseline     BP Readings from Last 3 Encounters:   06/23/21 (!) 122/94   05/14/21 132/89   04/28/21 122/86    (goal /80)      All Future Testing planned in [facility]:  Lab Frequency Next Occurrence   US RETROPERITONEAL COMPLETE Once 12/17/2021   CBC Auto Differential Once 12/06/2021       Next Visit Date:  Future Appointments   Date Time Provider Liliane Alves   8/13/2021 10:00 AM SCHEDULE, [facility] UROLOGY [facility]   8/23/2021  9:30 AM Jeb Bobby MD AFLNeph Tiff None   10/28/2021  9:00 AM Jodie Patino, APRN - CNP [facility]   12/22/2021  1:45 PM Elizabeth Feliciano MD tiff onc spe [facility]            Patient Active Problem List:     Bladder obstruction     Acute kidney injury ([address]     Leg edema, right     Acute deep vein thrombosis (DVT) of left peroneal vein ([address]     Essential hypertension     Normocytic anemia

## 2021-08-23 ENCOUNTER — HOSPITAL ENCOUNTER (OUTPATIENT)
Age: 61
Discharge: HOME | End: 2021-08-23

## 2021-08-23 DIAGNOSIS — N17.9 ACUTE KIDNEY INJURY (HCC): ICD-10-CM

## 2021-08-23 LAB
ANION GAP SERPL CALCULATED.3IONS-SCNC: 11 MMOL/L (ref 9–17)
BUN BLDV-MCNC: 27 MG/DL (ref 8–23)
BUN/CREAT BLD: 14 (ref 9–20)
CALCIUM SERPL-MCNC: 9.1 MG/DL (ref 8.6–10.4)
CHLORIDE BLD-SCNC: 105 MMOL/L (ref 98–107)
CO2: 25 MMOL/L (ref 20–31)
CREAT SERPL-MCNC: 1.9 MG/DL (ref 0.7–1.2)
GFR AFRICAN AMERICAN: 44 ML/MIN
GFR NON-AFRICAN AMERICAN: 36 ML/MIN
GFR SERPL CREATININE-BSD FRML MDRD: ABNORMAL ML/MIN/{1.73_M2}
GFR SERPL CREATININE-BSD FRML MDRD: ABNORMAL ML/MIN/{1.73_M2}
GLUCOSE BLD-MCNC: 99 MG/DL (ref 70–99)
POTASSIUM SERPL-SCNC: 4 MMOL/L (ref 3.7–5.3)
SODIUM BLD-SCNC: 141 MMOL/L (ref 135–144)

## 2021-08-23 PROCEDURE — 80048 BASIC METABOLIC PNL TOTAL CA: CPT

## 2021-08-23 PROCEDURE — 36415 COLL VENOUS BLD VENIPUNCTURE: CPT

## 2021-09-10 ENCOUNTER — OFFICE VISIT (OUTPATIENT)
Dept: UROLOGY | Age: 61
End: 2021-09-10

## 2021-09-10 VITALS
SYSTOLIC BLOOD PRESSURE: 142 MMHG | DIASTOLIC BLOOD PRESSURE: 95 MMHG | BODY MASS INDEX: 29.94 KG/M2 | HEART RATE: 70 BPM | WEIGHT: 246 LBS

## 2021-09-10 DIAGNOSIS — N40.1 BENIGN PROSTATIC HYPERPLASIA WITH URINARY RETENTION: Primary | ICD-10-CM

## 2021-09-10 DIAGNOSIS — R33.8 BENIGN PROSTATIC HYPERPLASIA WITH URINARY RETENTION: Primary | ICD-10-CM

## 2021-09-10 PROCEDURE — 99213 OFFICE O/P EST LOW 20 MIN: CPT | Performed by: UROLOGY

## 2021-09-10 PROCEDURE — 99212 OFFICE O/P EST SF 10 MIN: CPT | Performed by: UROLOGY

## 2021-09-10 NOTE — PROGRESS NOTES
Shari Rianajay, [address] [facility], Peg Agarwal, Rusty Taylor. Jarrett Finney [facility] Urology Clinic Consultation / New Patient Visit    Patient:  Koby Colbert  YOB: 1960  Date: 9/10/2021  Consult requested from [facility] 15 for purpose of evaluation of Bladder obstruction. HISTORY OF PRESENT ILLNESS:   The patient is a 59 y.o. male who presents today for follow-up for the following problem(s): High volume urinary retention  Overall the problem(s) : are worsening. Associated Symptoms: No dysuria, gross hematuria. Pain Severity:  0    9/10/21  Presents after work up of high volume urinary retention. He had cysto UDS which did not show an obstructive prostate. He has poor function of bladder, and is doing well on CIC regimen. RJ demonstrates resolution of BL hydronephrosis. 1/8/21  Presents after high volume retention of 8 L. Has catheter in place. 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 Off dialysis, and appears to have residual CKD. Summary of old records:   (Patient's old records, notes and chart reviewed and summarized above.)    Last several PSA's:  No results found for: PSA    Last total testosterone:  No results found for: TESTOSTERONE    Urinalysis today:  No results found for this visit on 09/10/21. Last BUN and creatinine:  Lab Results   Component Value Date    BUN 27 (H) 08/23/2021     Lab Results   Component Value Date    CREATININE 1.90 (H) 08/23/2021       Imaging Reviewed during this Office Visit:   (results were independently reviewed by physician and radiology report verified)    PAST MEDICAL, FAMILY AND SOCIAL HISTORY:  Past Medical History:   Diagnosis Date    NICOLE (acute kidney injury) ([address]     Family history of thyroid disease     History of pneumonia     Hx of blood clots     BLE DVTs    Hypertension     Pulmonary embolism ([address]     \"Approximately 8 years ago. \"     Wellness examination     pcp=Farhad Levy office-last visit Rachelle Runner 2021    Wellness examination     hematology-Dr Tobin-last visit jan 2021   [facility] Wellness examination     [address]phrology- [address] visit jan 2021     Past Surgical History:   Procedure Laterality Date    CHOLECYSTECTOMY      CYSTOGRAPHY N/A 2/12/2021    URODYNAMICS performed by Woody Hicks MD at [address] [place] [address]      with videourodynamics    CYSTOSCOPY N/A 2/12/2021    CYSTOSCOPY performed by Woody Hicks MD at [facility]      When pt was early 17's.  TONSILLECTOMY      As a child. Family History   Problem Relation Age of Onset    Thyroid Disease Mother     Arthritis Mother     Cirrhosis Father         Genetic Cirrhosis    Pacemaker Father      Outpatient Medications Marked as Taking for the 9/10/21 encounter (Office Visit) with Woody Hicks MD   Medication Sig Dispense Refill    calcitRIOL (ROCALTROL) 0.25 MCG capsule Take 1 capsule by mouth three times a week Monday/Wednesday/Friday 12 capsule 3    furosemide (LASIX) 20 MG tablet Take 1 tablet by mouth See Admin Instructions As needed for LE swelling 30 tablet 0    amLODIPine (NORVASC) 10 MG tablet TAKE 1 TABLET BY MOUTH EVERY DAY  90 tablet 0    tamsulosin (FLOMAX) 0.4 MG capsule Take 1 capsule by mouth daily Take one capsule daily to facilitate passage of ureteral stone 30 capsule 6    atorvastatin (LIPITOR) 20 MG tablet Take 1 tablet by mouth daily 30 tablet 5    aspirin EC 81 MG EC tablet Take 1 tablet by mouth daily 30 tablet 5    Multiple Vitamins-Minerals (THERAPEUTIC MULTIVITAMIN-MINERALS) tablet Take 1 tablet by mouth daily      Ferrous Gluconate (IRON 27 PO) Take by mouth three times a week Three times a week         Patient has no known allergies.   Social History     Tobacco Use   Smoking Status Never Smoker   Smokeless Tobacco Never Used       Social History     Substance and Sexual Activity   Alcohol Use Not Currently       REVIEW OF SYSTEMS:  Constitutional: negative  Eyes: negative  Respiratory: negative  Cardiovascular: negative  Gastrointestinal: negative  Musculoskeletal: negative  Genitourinary: negative  Skin: negative   Neurological: negative  Hematological/Lymphatic: negative  Psychological: negative    Physical Exam:    This a 59 y.o. male   Vitals:    09/10/21 1057   BP: (!) 142/95   Pulse:      Constitutional: Patient in no acute distress   Neuro: alert and oriented to person place and time. Psych: Mood and affect normal.  Head: atraumatic normocephalic  Eyes: EOMi  HEENT: neck supple, trachea midline  Lungs: Respiratory effort normal  Cardiovascular:  Normal peripheral pulses  Abdomen: Soft, non-tender, non-distended, No CVA  Bladder: non-tender and not distended. FROMx4, no cyanosis clubbing edema, No calf pain, EPCs in place  Skin: warm and dry  Trevino: lear yellow      Assessment and Plan      1. Benign prostatic hyperplasia with urinary retention           Plan:      No follow-ups on file.   6 months with voiding diary  Continue cic

## 2021-10-28 ENCOUNTER — OFFICE VISIT (OUTPATIENT)
Dept: PRIMARY CARE CLINIC | Age: 61
End: 2021-10-28

## 2021-10-28 VITALS
SYSTOLIC BLOOD PRESSURE: 136 MMHG | HEART RATE: 81 BPM | RESPIRATION RATE: 18 BRPM | WEIGHT: 252.6 LBS | OXYGEN SATURATION: 99 % | HEIGHT: 76 IN | DIASTOLIC BLOOD PRESSURE: 90 MMHG | TEMPERATURE: 98.6 F | BODY MASS INDEX: 30.76 KG/M2

## 2021-10-28 DIAGNOSIS — E78.5 DYSLIPIDEMIA: ICD-10-CM

## 2021-10-28 DIAGNOSIS — I10 ESSENTIAL (PRIMARY) HYPERTENSION: Primary | ICD-10-CM

## 2021-10-28 PROCEDURE — 99214 OFFICE O/P EST MOD 30 MIN: CPT | Performed by: NURSE PRACTITIONER

## 2021-10-28 RX ORDER — AMLODIPINE BESYLATE 10 MG/1
10 TABLET ORAL DAILY
Qty: 90 TABLET | Refills: 2 | Status: SHIPPED | OUTPATIENT
Start: 2021-10-28 | End: 2022-07-06

## 2021-10-28 SDOH — ECONOMIC STABILITY: FOOD INSECURITY: WITHIN THE PAST 12 MONTHS, YOU WORRIED THAT YOUR FOOD WOULD RUN OUT BEFORE YOU GOT MONEY TO BUY MORE.: NEVER TRUE

## 2021-10-28 SDOH — ECONOMIC STABILITY: FOOD INSECURITY: WITHIN THE PAST 12 MONTHS, THE FOOD YOU BOUGHT JUST DIDN'T LAST AND YOU DIDN'T HAVE MONEY TO GET MORE.: NEVER TRUE

## 2021-10-28 ASSESSMENT — SOCIAL DETERMINANTS OF HEALTH (SDOH): HOW HARD IS IT FOR YOU TO PAY FOR THE VERY BASICS LIKE FOOD, HOUSING, MEDICAL CARE, AND HEATING?: NOT HARD AT ALL

## 2021-10-28 NOTE — PATIENT INSTRUCTIONS
SURVEY:    You may be receiving a survey from [employer] regarding your visit today. Please complete the survey to enable us to provide the highest quality of care to you and your family. If you cannot score us a very good on any question, please call the office to discuss how we could have made your experience a very good one. Thank you.   Jason Graves, APRN-EVELINE White, CNP  Carlo Jama, LPN  Alistair Marcus, LPN  Farrah Peters, RMARABELLA Bass, CLARIBEL Saeed, CMA  Delfina, PCA

## 2021-10-28 NOTE — PROGRESS NOTES
Name: Milan Mueller  : 1960         Chief Complaint:     Chief Complaint   Patient presents with    Check-Up     6 months - wanted to discuss the atorvastin and its side effects     Hypertension    Hyperlipidemia       History of Present Illness:      Milan Mueller is a 59 y.o.  male who presents with Check-Up (6 months - wanted to discuss the atorvastin and its side effects ), Hypertension, and Hyperlipidemia    Ginger Smith is here for a 6 month check up. He states he is doing well. He did stop taking his Atorvastatin due to side effects. He just states \"I didn't like how it made me feel\". He continues to follow up with Nephrology and Hematology. He has no new concerns today. Past Medical History:     Past Medical History:   Diagnosis Date    [facility]     Family history of thyroid disease     History of pneumonia     Hx of blood clots     BLE DVTs    Hypertension     Pulmonary embolism ([facility]     \"Approximately 8 years ago. \"     Wellness examination     pcp=Farhad Levy office-last visit 2021   [facility] Wellness examination     hematology-Dr Tobin-last visit 2021   [facility] Wellness examination     nephrology- [address] [place] [address] visit 2021      Reviewed all health maintenance requirements and ordered appropriate tests  Health Maintenance Due   Topic Date Due    Colon cancer screen colonoscopy  Never done       Past Surgical History:     Past Surgical History:   Procedure Laterality Date    CHOLECYSTECTOMY      CYSTOGRAPHY N/A 2021    URODYNAMICS performed by Shazia Plascencia MD at [address] [facility]      with videourodynamics    CYSTOSCOPY N/A 2021    CYSTOSCOPY performed by Shazia Plascencia MD at / [place]      When pt was early 17's.  TONSILLECTOMY      As a child. Medications:       Prior to Admission medications    Medication Sig Start Date End Date Taking?  Authorizing Provider   amLODIPine (NORVASC) 10 MG tablet Take 1 tablet by mouth daily 10/28/21 1/26/22 Yes SHARON Locke CNP   calcitRIOL (ROCALTROL) 0.25 MCG capsule Take 1 capsule by mouth three times a week Monday/Wednesday/Friday 8/23/21 10/28/21 Yes Vijay Reid MD   tamsulosin (FLOMAX) 0.4 MG capsule Take 1 capsule by mouth daily Take one capsule daily to facilitate passage of ureteral stone 4/9/21  Yes Vincent Ochoa MD   aspirin EC 81 MG EC tablet Take 1 tablet by mouth daily 2/17/21  Yes SHARON Koenig CNP   Multiple Vitamins-Minerals (THERAPEUTIC MULTIVITAMIN-MINERALS) tablet Take 1 tablet by mouth daily   Yes Historical Provider, MD   Ferrous Gluconate (IRON 27 PO) Take by mouth three times a week Three times a week   Yes Historical Provider, MD   furosemide (LASIX) 20 MG tablet Take 1 tablet by mouth See Admin Instructions As needed for LE swelling 8/23/21 9/22/21  Vijay Reid MD        Allergies:       Patient has no known allergies. Social History:     Tobacco:    reports that he has never smoked. He has never used smokeless tobacco.  Alcohol:      reports previous alcohol use. Drug Use:  reports previous drug use. Family History:     Family History   Problem Relation Age of Onset    Thyroid Disease Mother     Arthritis Mother     Cirrhosis Father         Genetic Cirrhosis    Pacemaker Father        Review of Systems:     Positive and Negative as described in HPI    Review of Systems   Constitutional: Negative. HENT: Negative. Eyes: Negative. Respiratory: Negative. Cardiovascular: Negative. Gastrointestinal: Negative. Endocrine: Negative. Genitourinary:        He self caths and states no difficulty   Musculoskeletal: Negative. Skin: Negative. Allergic/Immunologic: Negative. Neurological: Negative. Hematological: Negative. Psychiatric/Behavioral: Negative.         Physical Exam:   Vitals:  BP (!) 136/90 (Position: Sitting)   Pulse 81   Temp 98.6 °F (37 °C) (Temporal)   Resp 18   Ht 6' 4\" (1.93 m)   Wt 252 lb 9.6 oz (114.6 kg)   SpO2 99%   BMI 30.75 kg/m²     Physical Exam  Vitals and nursing note reviewed. Constitutional:       Appearance: Normal appearance. HENT:      Head: Normocephalic. Right Ear: Tympanic membrane normal.      Left Ear: Tympanic membrane normal.      Nose: Nose normal.      Mouth/Throat:      Mouth: Mucous membranes are moist.      Pharynx: Oropharynx is clear. Eyes:      Pupils: Pupils are equal, round, and reactive to light. Neck:      Vascular: No carotid bruit. Cardiovascular:      Rate and Rhythm: Normal rate and regular rhythm. Heart sounds: Normal heart sounds. Pulmonary:      Effort: Pulmonary effort is normal.      Breath sounds: Normal breath sounds. Musculoskeletal:         General: Normal range of motion. Cervical back: Normal range of motion. Lymphadenopathy:      Cervical: No cervical adenopathy. Skin:     General: Skin is warm. Capillary Refill: Capillary refill takes less than 2 seconds. Neurological:      General: No focal deficit present. Mental Status: He is alert and oriented to person, place, and time.    Psychiatric:         Mood and Affect: Mood normal.         Data:     Lab Results   Component Value Date     08/23/2021    K 4.0 08/23/2021     08/23/2021    CO2 25 08/23/2021    BUN 27 08/23/2021    CREATININE 1.90 08/23/2021    GLUCOSE 99 08/23/2021    PROT 6.2 12/20/2020    LABALBU 3.1 12/20/2020    BILITOT 0.37 12/20/2020    ALKPHOS 73 12/20/2020    AST 7 12/20/2020    ALT 10 12/20/2020     Lab Results   Component Value Date    WBC 8.3 06/09/2021    RBC 4.89 06/09/2021    HGB 14.4 06/09/2021    HCT 43.1 06/09/2021    MCV 88.1 06/09/2021    MCH 29.4 06/09/2021    MCHC 33.4 06/09/2021    RDW 14.0 06/09/2021     06/09/2021    MPV 10.4 06/09/2021     No results found for: TSH  Lab Results   Component Value Date    CHOL 208 02/15/2021    HDL 45 02/15/2021 Assessment/Plan:      Diagnosis Orders   1. Essential (primary) hypertension  amLODIPine (NORVASC) 10 MG tablet    Lipid Panel   2. Dyslipidemia     Essential hypertension-Patient will continue to take Norvasc as prescribed. He states he does monitor his blood pressure at home. He denies any headaches, chest pain, palpitations or shortness of breath. Dyslipidemia-Lipid panel ordered. Pt. States he does exercise and stays very busy. He stopped taking his Atorvastatin as he states he did not like the side effects. We will continue to monitor. The 10-year ASCVD risk score (Felix Terry, et al., 2013) is: 15.5%    Values used to calculate the score:      Age: 59 years      Sex: Male      Is Non- : No      Diabetic: No      Tobacco smoker: No      Systolic Blood Pressure: 288 mmHg      Is BP treated: Yes      HDL Cholesterol: 45 mg/dL      Total Cholesterol: 208 mg/dL     Chronic Kidney disease-stable. He will continue to follow up with nephrology. BPH with urinary retention-stable. He will continue to follow up with urology. He continues to straight cath as needed. 1.  Manuel received counseling on the following healthy behaviors: nutrition, exercise and medication adherence  2. Patient given educational materials - see patient instructions  3. Was a self-tracking handout given in paper form or via [employer]? No  If yes, see orders or list here. 4.  Discussed use, benefit, and side effects of prescribed medications. Barriers to medication compliance addressed. All patient questions answered. Pt voiced understanding. 5.  Reviewed prior labs and health maintenance  6. Continue current medications, diet and exercise. Completed Refills   Requested Prescriptions     Signed Prescriptions Disp Refills    amLODIPine (NORVASC) 10 MG tablet 90 tablet 2     Sig: Take 1 tablet by mouth daily         Return in about 6 months (around 4/28/2022).

## 2021-11-01 ASSESSMENT — ENCOUNTER SYMPTOMS
RESPIRATORY NEGATIVE: 1
EYES NEGATIVE: 1
ALLERGIC/IMMUNOLOGIC NEGATIVE: 1
GASTROINTESTINAL NEGATIVE: 1

## 2021-12-08 ENCOUNTER — HOSPITAL ENCOUNTER (OUTPATIENT)
Age: 61
Discharge: HOME | End: 2021-12-08

## 2021-12-08 DIAGNOSIS — N17.9 ACUTE KIDNEY INJURY (HCC): ICD-10-CM

## 2021-12-08 DIAGNOSIS — I82.452 ACUTE DEEP VEIN THROMBOSIS (DVT) OF LEFT PERONEAL VEIN (HCC): ICD-10-CM

## 2021-12-08 DIAGNOSIS — D64.9 NORMOCYTIC ANEMIA: ICD-10-CM

## 2021-12-08 DIAGNOSIS — I10 ESSENTIAL (PRIMARY) HYPERTENSION: ICD-10-CM

## 2021-12-08 LAB
ABSOLUTE EOS #: 0.2 K/UL (ref 0–0.44)
ABSOLUTE IMMATURE GRANULOCYTE: <0.03 K/UL (ref 0–0.3)
ABSOLUTE LYMPH #: 1.82 K/UL (ref 1.1–3.7)
ABSOLUTE MONO #: 0.64 K/UL (ref 0.1–1.2)
ANION GAP SERPL CALCULATED.3IONS-SCNC: 13 MMOL/L (ref 9–17)
BASOPHILS # BLD: 0 % (ref 0–2)
BASOPHILS ABSOLUTE: 0.03 K/UL (ref 0–0.2)
BUN BLDV-MCNC: 19 MG/DL (ref 8–23)
BUN/CREAT BLD: 12 (ref 9–20)
CALCIUM IONIZED: 1.27 MMOL/L (ref 1.13–1.33)
CALCIUM SERPL-MCNC: 9.5 MG/DL (ref 8.6–10.4)
CHLORIDE BLD-SCNC: 100 MMOL/L (ref 98–107)
CHOLESTEROL/HDL RATIO: 5.3
CHOLESTEROL: 200 MG/DL
CO2: 24 MMOL/L (ref 20–31)
CREAT SERPL-MCNC: 1.61 MG/DL (ref 0.7–1.2)
CREATININE URINE: 74.9 MG/DL (ref 39–259)
DIFFERENTIAL TYPE: ABNORMAL
EOSINOPHILS RELATIVE PERCENT: 2 % (ref 1–4)
GFR AFRICAN AMERICAN: 53 ML/MIN
GFR NON-AFRICAN AMERICAN: 44 ML/MIN
GFR SERPL CREATININE-BSD FRML MDRD: ABNORMAL ML/MIN/{1.73_M2}
GFR SERPL CREATININE-BSD FRML MDRD: ABNORMAL ML/MIN/{1.73_M2}
GLUCOSE BLD-MCNC: 88 MG/DL (ref 70–99)
HCT VFR BLD CALC: 49.8 % (ref 40.7–50.3)
HDLC SERPL-MCNC: 38 MG/DL
HEMOGLOBIN: 16.7 G/DL (ref 13–17)
IMMATURE GRANULOCYTES: 0 %
LDL CHOLESTEROL: 139 MG/DL (ref 0–130)
LYMPHOCYTES # BLD: 19 % (ref 24–43)
MCH RBC QN AUTO: 29.6 PG (ref 25.2–33.5)
MCHC RBC AUTO-ENTMCNC: 33.5 G/DL (ref 28.4–34.8)
MCV RBC AUTO: 88.3 FL (ref 82.6–102.9)
MONOCYTES # BLD: 7 % (ref 3–12)
NRBC AUTOMATED: 0 PER 100 WBC
PDW BLD-RTO: 13.3 % (ref 11.8–14.4)
PHOSPHORUS: 2.7 MG/DL (ref 2.5–4.5)
PLATELET # BLD: 492 K/UL (ref 138–453)
PLATELET ESTIMATE: ABNORMAL
PMV BLD AUTO: 9.3 FL (ref 8.1–13.5)
POTASSIUM SERPL-SCNC: 3.9 MMOL/L (ref 3.7–5.3)
PTH INTACT: 160.8 PG/ML (ref 15–65)
RBC # BLD: 5.64 M/UL (ref 4.21–5.77)
RBC # BLD: ABNORMAL 10*6/UL
SEG NEUTROPHILS: 72 % (ref 36–65)
SEGMENTED NEUTROPHILS ABSOLUTE COUNT: 6.81 K/UL (ref 1.5–8.1)
SODIUM BLD-SCNC: 137 MMOL/L (ref 135–144)
TOTAL PROTEIN, URINE: 41 MG/DL
TRIGL SERPL-MCNC: 116 MG/DL
VITAMIN D 25-HYDROXY: 39.3 NG/ML (ref 30–100)
VLDLC SERPL CALC-MCNC: ABNORMAL MG/DL (ref 1–30)
WBC # BLD: 9.5 K/UL (ref 3.5–11.3)
WBC # BLD: ABNORMAL 10*3/UL

## 2021-12-08 PROCEDURE — 84156 ASSAY OF PROTEIN URINE: CPT

## 2021-12-08 PROCEDURE — 36415 COLL VENOUS BLD VENIPUNCTURE: CPT

## 2021-12-08 PROCEDURE — 82570 ASSAY OF URINE CREATININE: CPT

## 2021-12-08 PROCEDURE — 80061 LIPID PANEL: CPT

## 2021-12-08 PROCEDURE — 85025 COMPLETE CBC W/AUTO DIFF WBC: CPT

## 2021-12-08 PROCEDURE — 83970 ASSAY OF PARATHORMONE: CPT

## 2021-12-08 PROCEDURE — 80048 BASIC METABOLIC PNL TOTAL CA: CPT

## 2021-12-08 PROCEDURE — 82330 ASSAY OF CALCIUM: CPT

## 2021-12-08 PROCEDURE — 82306 VITAMIN D 25 HYDROXY: CPT

## 2021-12-08 PROCEDURE — 84100 ASSAY OF PHOSPHORUS: CPT

## 2021-12-13 PROBLEM — N18.32 STAGE 3B CHRONIC KIDNEY DISEASE (HCC): Status: ACTIVE | Noted: 2021-12-13

## 2021-12-14 ENCOUNTER — TELEPHONE (OUTPATIENT)
Dept: PRIMARY CARE CLINIC | Age: 61
End: 2021-12-14

## 2021-12-14 NOTE — TELEPHONE ENCOUNTER
----- Message from SHARON Best CNP sent at 12/14/2021  9:50 AM EST -----  Please notify patient of normal lab results. Continue exercise and heart healthy diet.   Thanks Imani Boland

## 2022-01-02 NOTE — ED NOTES
Pt updated on transport, pt requested some pain meds for the ride.  Dr. Greg Early made aware and gives verbal order of 50mcg of 1001 Santino Martin RN  12/19/20 [id number] Negative

## 2022-03-11 ENCOUNTER — OFFICE VISIT (OUTPATIENT)
Dept: UROLOGY | Age: 62
End: 2022-03-11

## 2022-03-11 VITALS
HEART RATE: 67 BPM | BODY MASS INDEX: 31.17 KG/M2 | HEIGHT: 76 IN | DIASTOLIC BLOOD PRESSURE: 100 MMHG | SYSTOLIC BLOOD PRESSURE: 158 MMHG | WEIGHT: 256 LBS

## 2022-03-11 DIAGNOSIS — N31.2 ATONIC BLADDER: ICD-10-CM

## 2022-03-11 DIAGNOSIS — N40.1 BENIGN PROSTATIC HYPERPLASIA WITH URINARY RETENTION: Primary | ICD-10-CM

## 2022-03-11 DIAGNOSIS — N32.0 HYDRONEPHROSIS DUE TO OBSTRUCTION OF BLADDER: ICD-10-CM

## 2022-03-11 DIAGNOSIS — N13.30 HYDRONEPHROSIS DUE TO OBSTRUCTION OF BLADDER: ICD-10-CM

## 2022-03-11 DIAGNOSIS — R33.8 BENIGN PROSTATIC HYPERPLASIA WITH URINARY RETENTION: Primary | ICD-10-CM

## 2022-03-11 PROCEDURE — 99213 OFFICE O/P EST LOW 20 MIN: CPT | Performed by: UROLOGY

## 2022-03-11 NOTE — PROGRESS NOTES
Jordon Casanova, Kareen Topinabee, Daquan Fuentes, Rusty Mann. [facility] Consultation / New Patient Visit    Patient:  Latasha Flores  YOB: 1960  Date: 3/11/2022  Consult requested from Beth 15 for purpose of evaluation of Bladder obstruction. HISTORY OF PRESENT ILLNESS:   The patient is a 57 y.o. male who presents today for follow-up for the following problem(s): High volume urinary retention  Overall the problem(s) : are worsening. Associated Symptoms: No dysuria, gross hematuria. Pain Severity:  0    3/11/22   Presents after work up of high volume urinary retention, which caused him an atonic bladder. He had cysto UDS which did not show an obstructive prostate. He has poor function of bladder, and is doing well on CIC regimen. RJ demonstrates resolution of BL hydronephrosis. Cr continues to improve, 1.61      1/8/21  Presents after high volume retention of 8 L. Has catheter in place. 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 Off dialysis, and appears to have residual CKD. Summary of old records:   (Patient's old records, notes and chart reviewed and summarized above.)    Last several PSA's:  No results found for: PSA    Last total testosterone:  No results found for: TESTOSTERONE    Urinalysis today:  No results found for this visit on 03/11/22.       Last BUN and creatinine:  Lab Results   Component Value Date    BUN 19 12/08/2021     Lab Results   Component Value Date    CREATININE 1.61 (H) 12/08/2021       Imaging Reviewed during this Office Visit:   (results were independently reviewed by physician and radiology report verified)    PAST MEDICAL, FAMILY AND SOCIAL HISTORY:  Past Medical History:   Diagnosis Date    [facility] [address] [facility]     COVID-19 11/2021    Family history of thyroid disease     History of pneumonia     Hx of blood clots     BLE DVTs    Hypertension     Pulmonary embolism ([facility] [address]     \"Approximately 8 years ago. \"     Wellness examination     pcp=Farhad Levy office-last visit jan 2021   Nilam Snider Wellness examination     hematology-Dr Tobin-last visit jan 2021   Nilam Snider Wellness examination     nephrology- [address] [place] [address] Road visit jan 2021     Past Surgical History:   Procedure Laterality Date    CHOLECYSTECTOMY      CYSTOGRAPHY N/A 2/12/2021    URODYNAMICS performed by Sharyle Massy., MD at [facility]      with videourodynamics    CYSTOSCOPY N/A 2/12/2021    CYSTOSCOPY performed by Sharyle Massy., MD at [place] [id number]      When pt was early 17's.  TONSILLECTOMY      As a child. Family History   Problem Relation Age of Onset    Thyroid Disease Mother     Arthritis Mother     Cirrhosis Father         Genetic Cirrhosis    Pacemaker Father      Outpatient Medications Marked as Taking for the 3/11/22 encounter (Office Visit) with Sharyle Massy., MD   Medication Sig Dispense Refill    calcitRIOL (ROCALTROL) 0.25 MCG capsule TAKE 1 CAPSULE BY MOUTH THREE TIMES A WEEK ON MONDAY, WEDNESDAY AND FRIDAY 12 capsule 0    amLODIPine (NORVASC) 10 MG tablet Take 1 tablet by mouth daily 90 tablet 2    tamsulosin (FLOMAX) 0.4 MG capsule Take 1 capsule by mouth daily Take one capsule daily to facilitate passage of ureteral stone 30 capsule 6    aspirin EC 81 MG EC tablet Take 1 tablet by mouth daily 30 tablet 5    Multiple Vitamins-Minerals (THERAPEUTIC MULTIVITAMIN-MINERALS) tablet Take 1 tablet by mouth daily      Ferrous Gluconate (IRON 27 PO) Take by mouth three times a week Three times a week         Patient has no known allergies.   Social History     Tobacco Use   Smoking Status Never Smoker   Smokeless Tobacco Never Used       Social History     Substance and Sexual Activity   Alcohol Use Not Currently       REVIEW OF SYSTEMS:  Constitutional: negative  Eyes: negative  Respiratory: negative  Cardiovascular: negative  Gastrointestinal: negative  Musculoskeletal: negative  Genitourinary: negative  Skin: negative   Neurological: negative  Hematological/Lymphatic: negative  Psychological: negative    Physical Exam:    This a 57 y.o. male   Vitals:    03/11/22 0943   BP: (!) 158/100   Pulse:      Constitutional: Patient in no acute distress   Neuro: alert and oriented to person place and time. Psych: Mood and affect normal.  Head: atraumatic normocephalic  Eyes: EOMi  HEENT: neck supple, trachea midline  Lungs: Respiratory effort normal  Cardiovascular:  Normal peripheral pulses  Abdomen: Soft, non-tender, non-distended, No CVA  Bladder: non-tender and not distended. FROMx4, no cyanosis clubbing edema, No calf pain, EPCs in place  Skin: warm and dry  Trevino: lear yellow      Assessment and Plan      1. Benign prostatic hyperplasia with urinary retention    2. Atonic bladder    3. Hydronephrosis due to obstruction of bladder           Plan:      No follow-ups on file.   1 year with a renal US  Continue cic regimen  Cr continues to improve  Renal us demonstrates resolution of hydronephrosis  Discontinue flomax

## 2022-03-30 ENCOUNTER — HOSPITAL ENCOUNTER (OUTPATIENT)
Age: 62
Discharge: HOME | End: 2022-03-30

## 2022-03-30 DIAGNOSIS — N17.9 ACUTE KIDNEY INJURY (HCC): ICD-10-CM

## 2022-03-30 LAB
ANION GAP SERPL CALCULATED.3IONS-SCNC: 11 MMOL/L (ref 9–17)
BUN BLDV-MCNC: 27 MG/DL (ref 8–23)
BUN/CREAT BLD: 15 (ref 9–20)
CALCIUM SERPL-MCNC: 9.7 MG/DL (ref 8.6–10.4)
CHLORIDE BLD-SCNC: 100 MMOL/L (ref 98–107)
CO2: 25 MMOL/L (ref 20–31)
CREAT SERPL-MCNC: 1.81 MG/DL (ref 0.7–1.2)
GFR AFRICAN AMERICAN: 46 ML/MIN
GFR NON-AFRICAN AMERICAN: 38 ML/MIN
GFR SERPL CREATININE-BSD FRML MDRD: ABNORMAL ML/MIN/{1.73_M2}
GFR SERPL CREATININE-BSD FRML MDRD: ABNORMAL ML/MIN/{1.73_M2}
GLUCOSE BLD-MCNC: 102 MG/DL (ref 70–99)
POTASSIUM SERPL-SCNC: 4.2 MMOL/L (ref 3.7–5.3)
SODIUM BLD-SCNC: 136 MMOL/L (ref 135–144)

## 2022-03-30 PROCEDURE — 80048 BASIC METABOLIC PNL TOTAL CA: CPT

## 2022-03-30 PROCEDURE — 83970 ASSAY OF PARATHORMONE: CPT

## 2022-03-30 PROCEDURE — 36415 COLL VENOUS BLD VENIPUNCTURE: CPT

## 2022-03-30 PROCEDURE — 82330 ASSAY OF CALCIUM: CPT

## 2022-03-31 LAB
CALCIUM IONIZED: 1.31 MMOL/L (ref 1.13–1.33)
PTH INTACT: 134.4 PG/ML (ref 15–65)

## 2022-04-08 DIAGNOSIS — N17.9 ACUTE KIDNEY INJURY (HCC): ICD-10-CM

## 2022-04-08 DIAGNOSIS — D64.9 NORMOCYTIC ANEMIA: ICD-10-CM

## 2022-04-08 DIAGNOSIS — I82.452 ACUTE DEEP VEIN THROMBOSIS (DVT) OF LEFT PERONEAL VEIN (HCC): Primary | ICD-10-CM

## 2022-04-12 ENCOUNTER — HOSPITAL ENCOUNTER (OUTPATIENT)
Age: 62
Discharge: HOME | End: 2022-04-12

## 2022-04-12 DIAGNOSIS — N17.9 ACUTE KIDNEY INJURY (HCC): ICD-10-CM

## 2022-04-12 DIAGNOSIS — D64.9 NORMOCYTIC ANEMIA: ICD-10-CM

## 2022-04-12 DIAGNOSIS — I82.452 ACUTE DEEP VEIN THROMBOSIS (DVT) OF LEFT PERONEAL VEIN (HCC): ICD-10-CM

## 2022-04-12 LAB
ABSOLUTE EOS #: 0.41 K/UL (ref 0–0.44)
ABSOLUTE IMMATURE GRANULOCYTE: <0.03 K/UL (ref 0–0.3)
ABSOLUTE LYMPH #: 1.91 K/UL (ref 1.1–3.7)
ABSOLUTE MONO #: 0.71 K/UL (ref 0.1–1.2)
BASOPHILS # BLD: 1 % (ref 0–2)
BASOPHILS ABSOLUTE: 0.04 K/UL (ref 0–0.2)
EOSINOPHILS RELATIVE PERCENT: 5 % (ref 1–4)
HCT VFR BLD CALC: 47.3 % (ref 40.7–50.3)
HEMOGLOBIN: 15.7 G/DL (ref 13–17)
IMMATURE GRANULOCYTES: 0 %
LYMPHOCYTES # BLD: 24 % (ref 24–43)
MCH RBC QN AUTO: 29.8 PG (ref 25.2–33.5)
MCHC RBC AUTO-ENTMCNC: 33.2 G/DL (ref 28.4–34.8)
MCV RBC AUTO: 89.9 FL (ref 82.6–102.9)
MONOCYTES # BLD: 9 % (ref 3–12)
NRBC AUTOMATED: 0 PER 100 WBC
PDW BLD-RTO: 13.2 % (ref 11.8–14.4)
PLATELET # BLD: 261 K/UL (ref 138–453)
PMV BLD AUTO: 9.3 FL (ref 8.1–13.5)
RBC # BLD: 5.26 M/UL (ref 4.21–5.77)
SEG NEUTROPHILS: 61 % (ref 36–65)
SEGMENTED NEUTROPHILS ABSOLUTE COUNT: 4.94 K/UL (ref 1.5–8.1)
WBC # BLD: 8 K/UL (ref 3.5–11.3)

## 2022-04-12 PROCEDURE — 36415 COLL VENOUS BLD VENIPUNCTURE: CPT

## 2022-04-12 PROCEDURE — 85025 COMPLETE CBC W/AUTO DIFF WBC: CPT

## 2022-04-13 ENCOUNTER — OFFICE VISIT (OUTPATIENT)
Dept: ONCOLOGY | Age: 62
End: 2022-04-13

## 2022-04-13 VITALS
DIASTOLIC BLOOD PRESSURE: 105 MMHG | RESPIRATION RATE: 18 BRPM | TEMPERATURE: 98.8 F | WEIGHT: 256 LBS | HEIGHT: 76 IN | SYSTOLIC BLOOD PRESSURE: 151 MMHG | HEART RATE: 74 BPM | BODY MASS INDEX: 31.17 KG/M2

## 2022-04-13 DIAGNOSIS — I10 ESSENTIAL HYPERTENSION: Primary | ICD-10-CM

## 2022-04-13 DIAGNOSIS — N17.9 ACUTE KIDNEY INJURY (HCC): ICD-10-CM

## 2022-04-13 DIAGNOSIS — I82.452 ACUTE DEEP VEIN THROMBOSIS (DVT) OF LEFT PERONEAL VEIN (HCC): ICD-10-CM

## 2022-04-13 PROCEDURE — 99214 OFFICE O/P EST MOD 30 MIN: CPT | Performed by: INTERNAL MEDICINE

## 2022-04-13 NOTE — PROGRESS NOTES
Chief Complaint   Patient presents with    Follow-up     DVT anemia     DIAGNOSIS:       1. Anemia: Most likely anemia of chronic disease also renal disease  2. Kidney injury: Evaluated by nephrology and received hemodialysis  3. Bilateral hydronephrosis  4. Bladder obstruction  5. History of PE in the past     CURRENT THERAPY:         Xarelto since 2012. Discontinued June 2021. Observation for anemia. Plan for Aranesp for Hb below 10. BRIEF CASE HISTORY:      Oralia Camargo is a 57 y.o.  male who is admitted to the hospital for flank/abdominal pain. Patient also has decreased urine output. Patient had elevated creatinine at 7.8. He was seen by nephrology and started on hemodialysis. Patient has history of PE remotely and states he is  He denies any chest, drenching night sweats or fever chills. He denies any alcohol or tobacco use. Also his hemoglobin noted to be 10.6 therefore hematology consulted for evaluation of his anemia. His WBC 12.3, platelets 938. INTERIM HISTORY:   The patient is seen for follow up anemia. Clinically stable. No active bleeding. His blood pressure slightly elevated. He is on amlodipine 5 mg. I asked him to adjusted to 10 mg by taking 2 tablets. He has an appointment with nephrology in 2 weeks. Continues follow up with nephrology. Past Medical History:   has a past medical history of NICOLE (acute kidney injury) ([address] COVID-19, Family history of thyroid disease, History of pneumonia, Hx of blood clots, Hypertension, Pulmonary embolism ([address] Wellness examination, Wellness examination, and Wellness examination. Past Surgical History:   has a past surgical history that includes Cholecystectomy; knee surgery; Tonsillectomy; Cystoscopy; Cystography (N/A, 2/12/2021); and Cystoscopy (N/A, 2/12/2021). Medications:    Prior to Admission medications    Medication Sig Start Date End Date Taking?  Authorizing Provider   calcitRIOL (ROCALTROL) 0.25 MCG capsule TAKE 1 CAPSULE BY MOUTH [facility]ES [facility] AND FRIDAY 2/15/22  Yes Velma Wilson MD   amLODIPine (NORVASC) 10 MG tablet Take 1 tablet by mouth daily 10/28/21 4/13/22 Yes Jada Yepez, APRN - CNP   aspirin EC 81 MG EC tablet Take 1 tablet by mouth daily 2/17/21  Yes Farhad Patino, APRN - CNP   Multiple Vitamins-Minerals (THERAPEUTIC MULTIVITAMIN-MINERALS) tablet Take 1 tablet by mouth daily   Yes Historical Provider, MD   Ferrous Gluconate (IRON 27 PO) Take by mouth three times a week Three times a week   Yes Historical Provider, MD   furosemide (LASIX) 20 MG tablet Take 1 tablet by mouth See Admin Instructions As needed for LE swelling  Patient not taking: Reported on 12/13/2021 8/23/21 9/22/21  Velma Wilson MD   tamsulosin (FLOMAX) 0.4 MG capsule Take 1 capsule by mouth daily Take one capsule daily to facilitate passage of ureteral stone 4/9/21   Solange Green MD     Current Outpatient Medications   Medication Sig Dispense Refill    calcitRIOL (ROCALTROL) 0.25 MCG capsule TAKE 1 CAPSULE BY MOUTH THREE TIMES A WEEK ON MONDAY, WEDNESDAY AND FRIDAY 12 capsule 0    amLODIPine (NORVASC) 10 MG tablet Take 1 tablet by mouth daily 90 tablet 2    aspirin EC 81 MG EC tablet Take 1 tablet by mouth daily 30 tablet 5    Multiple Vitamins-Minerals (THERAPEUTIC MULTIVITAMIN-MINERALS) tablet Take 1 tablet by mouth daily      Ferrous Gluconate (IRON 27 PO) Take by mouth three times a week Three times a week      furosemide (LASIX) 20 MG tablet Take 1 tablet by mouth See Admin Instructions As needed for LE swelling (Patient not taking: Reported on 12/13/2021) 30 tablet 0    tamsulosin (FLOMAX) 0.4 MG capsule Take 1 capsule by mouth daily Take one capsule daily to facilitate passage of ureteral stone 30 capsule 6     No current facility-administered medications for this visit. Allergies:  Patient has no known allergies. Social History:   reports that he has never smoked.  He has never used smokeless tobacco. He reports previous alcohol use. He reports previous drug use. Family History: family history includes Arthritis in his mother; Cirrhosis in his father; Pacemaker in his father; Thyroid Disease in his mother. REVIEW OF SYSTEMS:    Constitutional: No fever or chills. No night sweats, no weight loss   Eyes: No eye discharge, double vision, or eye pain   HEENT: negative for sore mouth, sore throat, hoarseness and voice change   Respiratory: negative for cough , sputum, dyspnea, wheezing, hemoptysis, chest pain   Cardiovascular: negative for chest pain, dyspnea, palpitations, orthopnea, PND   Gastrointestinal: negative for nausea, vomiting, diarrhea, constipation, abdominal pain, Dysphagia, hematemesis and hematochezia   Genitourinary: negative for frequency, dysuria, nocturia, urinary incontinence, and hematuria   Integument: negative for rash, skin lesions, bruises.    Hematologic/Lymphatic: negative for easy bruising, bleeding, lymphadenopathy, or petechiae   Endocrine: negative for heat or cold intolerance,weight changes, change in bowel habits and hair loss   Musculoskeletal: negative for myalgias, arthralgias, pain, joint swelling,and bone pain   Neurological: negative for headaches, dizziness, seizures, weakness, numbness    PHYSICAL EXAM:      BP (!) 151/105 (Site: Right Upper Arm, Position: Sitting, Cuff Size: Medium Adult)   Pulse 74   Temp 98.8 °F (37.1 °C) (Temporal)   Resp 18   Ht 6' 4\" (1.93 m)   Wt 256 lb (116.1 kg)   BMI 31.16 kg/m²    Temp (24hrs), Av.8 °F (37.1 °C), Min:97.1 °F (36.2 °C), Max:99.7 °F (37.6 °C)    General appearance - well appearing, no in pain or distress   Mental status - alert and cooperative   Eyes - pupils equal and reactive, extraocular eye movements intact   Ears - bilateral TM's and external ear canals normal   Mouth - mucous membranes moist, pharynx normal without lesions   Neck - supple, no significant adenopathy   Lymphatics - no palpable lymphadenopathy, no hepatosplenomegaly   Chest - clear to auscultation, no wheezes, rales or rhonchi, symmetric air entry   Heart - normal rate, regular rhythm, normal S1, S2, no murmurs  Abdomen - soft, nontender, nondistended, no masses or organomegaly   Neurological - alert, oriented, normal speech, no focal findings or movement disorder noted   Musculoskeletal - no joint tenderness, deformity or swelling   Extremities - peripheral pulses normal, no pedal edema, no clubbing or cyanosis   Skin - normal coloration and turgor, no rashes, no suspicious skin lesions noted ,    DATA:    Lab Results   Component Value Date    WBC 8.0 04/12/2022    HGB 15.7 04/12/2022    HCT 47.3 04/12/2022    MCV 89.9 04/12/2022     04/12/2022       Chemistry        Component Value Date/Time     03/30/2022 1015    K 4.2 03/30/2022 1015     03/30/2022 1015    CO2 25 03/30/2022 1015    BUN 27 (H) 03/30/2022 1015    CREATININE 1.81 (H) 03/30/2022 1015        Component Value Date/Time    CALCIUM 9.7 03/30/2022 1015    ALKPHOS 73 12/20/2020 0607    AST 7 12/20/2020 0607    ALT 10 12/20/2020 0607    BILITOT 0.37 12/20/2020 0607            IMPRESSION:   6. Anemia: Most likely anemia of chronic disease also renal disease, resolved   7. Kidney injury: Evaluated by nephrology and received hemodialysis  8. Bilateral hydronephrosis  9. Bladder obstruction  10. History of PE in the past  11. Hypertension    RECOMMENDATIONS:  1. I reviewed the laboratory data, imaging studies, diagnosis, prognosis and treatment options with patient  2. Hemoglobin is returned to normal.  3. He has been off anticoagulant for almost a year without any problems  4. Continues follow up with nephrology.   5. Blood pressure is elevated, as discussed above, I asked him to adjust amlodipine to 10 mg and follow-up with nephrology  Return as needed    MARCO A HARRIST.[facility] MD Jony  Hematologist/Medical [id number] [facility] hematology oncology physicians This note is created with the assistance of a speech recognition program.  While intending to generate a document that actually reflects the content of the visit, the document can still have some errors including those of syntax and sound a like substitutions which may escape proof reading. It such instances, actual meaning can be extrapolated by contextual diversion.

## 2022-04-27 ENCOUNTER — OFFICE VISIT (OUTPATIENT)
Dept: PRIMARY CARE CLINIC | Age: 62
End: 2022-04-27

## 2022-04-27 VITALS
DIASTOLIC BLOOD PRESSURE: 84 MMHG | OXYGEN SATURATION: 98 % | SYSTOLIC BLOOD PRESSURE: 130 MMHG | RESPIRATION RATE: 18 BRPM | BODY MASS INDEX: 30.84 KG/M2 | TEMPERATURE: 98.1 F | WEIGHT: 253.4 LBS | HEART RATE: 71 BPM

## 2022-04-27 DIAGNOSIS — N18.9 CHRONIC KIDNEY DISEASE, UNSPECIFIED CKD STAGE: ICD-10-CM

## 2022-04-27 DIAGNOSIS — I82.4Y3 DEEP VEIN THROMBOSIS (DVT) OF PROXIMAL VEIN OF BOTH LOWER EXTREMITIES, UNSPECIFIED CHRONICITY (HCC): ICD-10-CM

## 2022-04-27 DIAGNOSIS — I10 ESSENTIAL (PRIMARY) HYPERTENSION: Primary | ICD-10-CM

## 2022-04-27 PROCEDURE — 99214 OFFICE O/P EST MOD 30 MIN: CPT | Performed by: NURSE PRACTITIONER

## 2022-04-27 ASSESSMENT — PATIENT HEALTH QUESTIONNAIRE - PHQ9
SUM OF ALL RESPONSES TO PHQ QUESTIONS 1-9: 0
SUM OF ALL RESPONSES TO PHQ9 QUESTIONS 1 & 2: 0
1. LITTLE INTEREST OR PLEASURE IN DOING THINGS: 0
2. FEELING DOWN, DEPRESSED OR HOPELESS: 0
SUM OF ALL RESPONSES TO PHQ QUESTIONS 1-9: 0

## 2022-04-27 ASSESSMENT — ENCOUNTER SYMPTOMS
ALLERGIC/IMMUNOLOGIC NEGATIVE: 1
RESPIRATORY NEGATIVE: 1
GASTROINTESTINAL NEGATIVE: 1
EYES NEGATIVE: 1

## 2022-04-27 NOTE — PROGRESS NOTES
by Haja Cleveland MD at [address] [facility]      with videourodynamics    CYSTOSCOPY N/A 2/12/2021    CYSTOSCOPY performed by Haja Cleveland MD at [address] [facility]      When pt was early 17's.  TONSILLECTOMY      As a child. Medications:       Prior to Admission medications    Medication Sig Start Date End Date Taking? Authorizing Provider   calcitRIOL (ROCALTROL) 0.25 MCG capsule TAKE 1 CAPSULE BY MOUTH THREE TIMES A WEEK ON MONDAY, [facility] AND FRIDAY 4/22/22  Yes Shayna Da Silva MD   amLODIPine (NORVASC) 10 MG tablet Take 1 tablet by mouth daily 10/28/21 4/27/22 Yes Chantal Sanderson APRN - CNP   aspirin EC 81 MG EC tablet Take 1 tablet by mouth daily 2/17/21  Yes Farhad Patino, APRN - CNP   Multiple Vitamins-Minerals (THERAPEUTIC MULTIVITAMIN-MINERALS) tablet Take 1 tablet by mouth daily   Yes Historical Provider, MD   Ferrous Gluconate (IRON 27 PO) Take by mouth three times a week Three times a week   Yes Historical Provider, MD   furosemide (LASIX) 20 MG tablet Take 1 tablet by mouth See Admin Instructions As needed for LE swelling  Patient not taking: Reported on 12/13/2021 8/23/21 9/22/21  Shayna Da Silva MD   tamsulosin (FLOMAX) 0.4 MG capsule Take 1 capsule by mouth daily Take one capsule daily to facilitate passage of ureteral stone 4/9/21   Haja Cleveland MD        Allergies:       Patient has no known allergies. Social History:     Tobacco:    reports that he has never smoked. He has never used smokeless tobacco.  Alcohol:      reports previous alcohol use. Drug Use:  reports previous drug use. Family History:     Family History   Problem Relation Age of Onset    Thyroid Disease Mother     Arthritis Mother     Cirrhosis Father         Genetic Cirrhosis    Pacemaker Father        Review of Systems:     Positive and Negative as described in HPI    Review of Systems   Constitutional: Negative. HENT: Negative. Eyes: Negative.   Negative for visual disturbance. Respiratory: Negative. Cardiovascular: Negative. Gastrointestinal: Negative. Endocrine: Negative. Genitourinary: Negative. Musculoskeletal: Negative. Skin: Negative. Allergic/Immunologic: Negative. Neurological: Negative. Negative for headaches. Hematological: Negative. Psychiatric/Behavioral: Negative. Physical Exam:   Vitals:  /84 (Site: Left Upper Arm)   Pulse 71   Temp 98.1 °F (36.7 °C) (Temporal)   Resp 18   Wt 253 lb 6.4 oz (114.9 kg)   SpO2 98%   BMI 30.84 kg/m²     Physical Exam  Vitals and nursing note reviewed. Constitutional:       Appearance: Normal appearance. HENT:      Head: Normocephalic. Right Ear: External ear normal.      Left Ear: External ear normal.      Nose: Nose normal.      Mouth/Throat:      Mouth: Mucous membranes are moist.      Pharynx: Oropharynx is clear. Eyes:      Conjunctiva/sclera: Conjunctivae normal.      Pupils: Pupils are equal, round, and reactive to light. Cardiovascular:      Rate and Rhythm: Normal rate and regular rhythm. Heart sounds: Normal heart sounds. Pulmonary:      Effort: Pulmonary effort is normal.      Breath sounds: Normal breath sounds. Musculoskeletal:         General: Normal range of motion. Cervical back: Normal range of motion. Skin:     General: Skin is warm. Capillary Refill: Capillary refill takes less than 2 seconds. Neurological:      General: No focal deficit present. Mental Status: He is alert and oriented to person, place, and time.    Psychiatric:         Mood and Affect: Mood normal.         Behavior: Behavior normal.         Data:     Lab Results   Component Value Date     03/30/2022    K 4.2 03/30/2022     03/30/2022    CO2 25 03/30/2022    BUN 27 03/30/2022    CREATININE 1.81 03/30/2022    GLUCOSE 102 03/30/2022    PROT 6.2 12/20/2020    LABALBU 3.1 12/20/2020    BILITOT 0.37 12/20/2020    ALKPHOS 73 12/20/2020    AST 7 12/20/2020    ALT 10 12/20/2020     Lab Results   Component Value Date    WBC 8.0 04/12/2022    RBC 5.26 04/12/2022    HGB 15.7 04/12/2022    HCT 47.3 04/12/2022    MCV 89.9 04/12/2022    MCH 29.8 04/12/2022    MCHC 33.2 04/12/2022    RDW 13.2 04/12/2022     04/12/2022    MPV 9.3 04/12/2022     No results found for: TSH  Lab Results   Component Value Date    CHOL 200 12/08/2021    HDL 38 12/08/2021       Assessment/Plan:      Diagnosis Orders   1. Essential (primary) hypertension     2. Chronic kidney disease, unspecified CKD stage     3. Deep vein thrombosis (DVT) of proximal vein of both lower extremities, unspecified chronicity (HCC)       Essential Hypertension-Continue Amlodipine 10 mg daily. Monitor blood pressures at home. Call office if blood pressures elevated at home. Continue with lifestyle modifications at home. 1.  Manuel received counseling on the following healthy behaviors: nutrition, exercise and medication adherence  2. Patient given educational materials - see patient instructions  3. Was a self-tracking handout given in paper form or via [employer]? No  If yes, see orders or list here. 4.  Discussed use, benefit, and side effects of prescribed medications. Barriers to medication compliance addressed. All patient questions answered. Pt voiced understanding. 5.  Reviewed prior labs and health maintenance  6. Continue current medications, diet and exercise. Completed Refills   Requested Prescriptions      No prescriptions requested or ordered in this encounter         Return in about 6 months (around 10/27/2022).

## 2022-04-27 NOTE — PATIENT INSTRUCTIONS
SURVEY:     You may be receiving a survey from [employer] regarding your visit today. Please complete the survey to enable us to provide the highest quality of care to you and your family. If you cannot score us a very good on any question, please call the office to discuss how we could have made your experience a very good one. Thank you.   Mehul Graves, APRN-EVELINE Henderson, CNP  Berta Dejesus, LPN  Jonathan Chaudhry, LPN  Rome Fothergill, CMA  Stephanie Cody, CMA  Darlin, CMA  Delfina, PCA

## 2022-07-06 DIAGNOSIS — I10 ESSENTIAL (PRIMARY) HYPERTENSION: ICD-10-CM

## 2022-07-06 RX ORDER — AMLODIPINE BESYLATE 10 MG/1
TABLET ORAL
Qty: 90 TABLET | Refills: 1 | Status: SHIPPED | OUTPATIENT
Start: 2022-07-06

## 2022-08-26 ENCOUNTER — HOSPITAL ENCOUNTER (OUTPATIENT)
Age: 62
Discharge: HOME | End: 2022-08-26

## 2022-08-26 LAB
ANION GAP SERPL CALCULATED.3IONS-SCNC: 10 MMOL/L (ref 9–17)
BUN BLDV-MCNC: 30 MG/DL (ref 8–23)
BUN/CREAT BLD: 19 (ref 9–20)
CALCIUM IONIZED: 1.3 MMOL/L (ref 1.13–1.33)
CALCIUM SERPL-MCNC: 9.5 MG/DL (ref 8.6–10.4)
CHLORIDE BLD-SCNC: 104 MMOL/L (ref 98–107)
CO2: 26 MMOL/L (ref 20–31)
CREAT SERPL-MCNC: 1.59 MG/DL (ref 0.7–1.2)
CREATININE URINE: 67.6 MG/DL (ref 39–259)
GFR AFRICAN AMERICAN: 53 ML/MIN
GFR NON-AFRICAN AMERICAN: 44 ML/MIN
GFR SERPL CREATININE-BSD FRML MDRD: ABNORMAL ML/MIN/{1.73_M2}
GFR SERPL CREATININE-BSD FRML MDRD: ABNORMAL ML/MIN/{1.73_M2}
GLUCOSE BLD-MCNC: 115 MG/DL (ref 70–99)
PHOSPHORUS: 2.7 MG/DL (ref 2.5–4.5)
POTASSIUM SERPL-SCNC: 4.2 MMOL/L (ref 3.7–5.3)
PTH INTACT: 146.8 PG/ML (ref 15–65)
SODIUM BLD-SCNC: 140 MMOL/L (ref 135–144)
TOTAL PROTEIN, URINE: 42 MG/DL
URINE TOTAL PROTEIN CREATININE RATIO: 0.62 (ref 0–0.2)
VITAMIN D 25-HYDROXY: 36.3 NG/ML

## 2022-08-26 PROCEDURE — 84156 ASSAY OF PROTEIN URINE: CPT

## 2022-08-26 PROCEDURE — 80048 BASIC METABOLIC PNL TOTAL CA: CPT

## 2022-08-26 PROCEDURE — 36415 COLL VENOUS BLD VENIPUNCTURE: CPT

## 2022-08-26 PROCEDURE — 84100 ASSAY OF PHOSPHORUS: CPT

## 2022-08-26 PROCEDURE — 82306 VITAMIN D 25 HYDROXY: CPT

## 2022-08-26 PROCEDURE — 83970 ASSAY OF PARATHORMONE: CPT

## 2022-08-26 PROCEDURE — 82330 ASSAY OF CALCIUM: CPT

## 2022-08-26 PROCEDURE — 82570 ASSAY OF URINE CREATININE: CPT

## 2022-11-01 ENCOUNTER — OFFICE VISIT (OUTPATIENT)
Dept: PRIMARY CARE CLINIC | Age: 62
End: 2022-11-01

## 2022-11-01 VITALS
RESPIRATION RATE: 18 BRPM | TEMPERATURE: 98.8 F | BODY MASS INDEX: 31.14 KG/M2 | HEART RATE: 78 BPM | SYSTOLIC BLOOD PRESSURE: 136 MMHG | WEIGHT: 255.8 LBS | DIASTOLIC BLOOD PRESSURE: 88 MMHG | OXYGEN SATURATION: 97 %

## 2022-11-01 DIAGNOSIS — Z23 NEED FOR SHINGLES VACCINE: ICD-10-CM

## 2022-11-01 DIAGNOSIS — I10 ESSENTIAL (PRIMARY) HYPERTENSION: Primary | ICD-10-CM

## 2022-11-01 DIAGNOSIS — Z12.11 COLON CANCER SCREENING: ICD-10-CM

## 2022-11-01 DIAGNOSIS — N18.9 CHRONIC KIDNEY DISEASE, UNSPECIFIED CKD STAGE: ICD-10-CM

## 2022-11-01 PROCEDURE — 99214 OFFICE O/P EST MOD 30 MIN: CPT | Performed by: NURSE PRACTITIONER

## 2022-11-01 PROCEDURE — 3078F DIAST BP <80 MM HG: CPT | Performed by: NURSE PRACTITIONER

## 2022-11-01 PROCEDURE — 3074F SYST BP LT 130 MM HG: CPT | Performed by: NURSE PRACTITIONER

## 2022-11-01 RX ORDER — ZOSTER VACCINE RECOMBINANT, ADJUVANTED 50 MCG/0.5
0.5 KIT INTRAMUSCULAR SEE ADMIN INSTRUCTIONS
Qty: 0.5 ML | Refills: 0 | Status: SHIPPED | OUTPATIENT
Start: 2022-11-01 | End: 2023-04-30

## 2022-11-01 SDOH — ECONOMIC STABILITY: FOOD INSECURITY: WITHIN THE PAST 12 MONTHS, YOU WORRIED THAT YOUR FOOD WOULD RUN OUT BEFORE YOU GOT MONEY TO BUY MORE.: NEVER TRUE

## 2022-11-01 SDOH — ECONOMIC STABILITY: FOOD INSECURITY: WITHIN THE PAST 12 MONTHS, THE FOOD YOU BOUGHT JUST DIDN'T LAST AND YOU DIDN'T HAVE MONEY TO GET MORE.: NEVER TRUE

## 2022-11-01 ASSESSMENT — PATIENT HEALTH QUESTIONNAIRE - PHQ9
SUM OF ALL RESPONSES TO PHQ QUESTIONS 1-9: 0
2. FEELING DOWN, DEPRESSED OR HOPELESS: 0
SUM OF ALL RESPONSES TO PHQ QUESTIONS 1-9: 0
SUM OF ALL RESPONSES TO PHQ QUESTIONS 1-9: 0
SUM OF ALL RESPONSES TO PHQ9 QUESTIONS 1 & 2: 0
1. LITTLE INTEREST OR PLEASURE IN DOING THINGS: 0
SUM OF ALL RESPONSES TO PHQ QUESTIONS 1-9: 0

## 2022-11-01 ASSESSMENT — ENCOUNTER SYMPTOMS
GASTROINTESTINAL NEGATIVE: 1
RESPIRATORY NEGATIVE: 1
ALLERGIC/IMMUNOLOGIC NEGATIVE: 1
EYES NEGATIVE: 1

## 2022-11-01 ASSESSMENT — SOCIAL DETERMINANTS OF HEALTH (SDOH): HOW HARD IS IT FOR YOU TO PAY FOR THE VERY BASICS LIKE FOOD, HOUSING, MEDICAL CARE, AND HEATING?: NOT HARD AT ALL

## 2022-11-01 NOTE — PATIENT INSTRUCTIONS
SURVEY:     You may be receiving a survey from [employer] regarding your visit today. Please complete the survey to enable us to provide the highest quality of care to you and your family. If you cannot score us a very good on any question, please call the office to discuss how we could have made your experience a very good one.      Thank you,    Chapin Graves, APRN-CNP  Miesha Dawkins, APRN-CNP  Lisha Loaiza, JUNN  Brock Thompson, CMA  Love Kline, CMA  Darlin, CMA  Delfina, PCA  Makenna, PM

## 2022-11-01 NOTE — PROGRESS NOTES
[facility]  Kassandra Angela, [address] [facility] PR[facility]  [address]  Dept: [phone]  Dept Fax: [phone]      Name: Juan Luis Baugh  : 1960         Chief Complaint:     Chief Complaint   Patient presents with    Hypertension     6 month check. No concerns. History of Present Illness:      Juan Luis Baugh is a 57 y.o.  male who presents with Hypertension (6 month check. No concerns. )    Elder Tillman is here today for a routine office visit. He does routinely follow up with Nephrology and is now on maintenance visits. In the spring he is having a ultra sound of his bladder. He is no longer taking lasix and flomax. He states his edema is minimal now and will resolve after elevation and rest.  He still does the self cath himself. He does not want to get on another medication for his BP. He is starting to increase his activity now that his edema has improved. He states he recently took a 12 hour car trip and did not have pain or swelling in his legs and he has been enjoying yard work again. He states that he does have white coat syndrome and does monitor his BP at home. Past Medical History:     Past Medical History:   Diagnosis Date    NICOLE (acute kidney injury) ([address]     COVID-19 2021    Family history of thyroid disease     History of pneumonia     Hx of blood clots     BLE DVTs    Hypertension     Pulmonary embolism ([facility] [address]     \"Approximately 8 years ago. \"     Wellness examination     pcp=[facility]had Levy office-last visit 2021    Wellness examination     hematology-Dr Tobin-last visit 2021    Wellness examination     nephrology- [address] [place] [address] visit 2021      Reviewed all health maintenance requirements and ordered appropriate tests  Health Maintenance Due   Topic Date Due    HIV screen  Never done    Diabetes screen  Never done    Colorectal Cancer Screen  Never done    Shingles vaccine (1 of 2) Never done Past Surgical History:     Past Surgical History:   Procedure Laterality Date    CHOLECYSTECTOMY      CYSTOGRAPHY N/A 2/12/2021    URODYNAMICS performed by Debera Severance., MD at [address] [place] [address] Drive      with videourodynamics    CYSTOSCOPY N/A 2/12/2021    CYSTOSCOPY performed by Debera Severance., MD at [place] [address]      When pt was early 17's. TONSILLECTOMY      As a child. Medications:       Prior to Admission medications    Medication Sig Start Date End Date Taking? Authorizing Provider   zoster recombinant adjuvanted vaccine [facility]) 50 MCG/0.5ML SUSR injection Inject 0.5 mLs into the muscle See Admin Instructions 1 dose now and repeat in 2-6 months 11/1/22 4/30/23 Yes SHARON Espinoza - CNP   calcitRIOL (ROCALTROL) 0.25 MCG capsule Take 1 capsule by mouth Five times weekly Monday, Tuesday/Wednesday/Thursday/and Friday 8/31/22 11/1/22 Yes Kamila Anguiano MD   amLODIPine (NORVASC) 10 MG tablet TAKE 1 TABLET BY MOUTH EVERY DAY 7/6/22  Yes Ma Flurry Star, APRN - CNP   aspirin EC 81 MG EC tablet Take 1 tablet by mouth daily 2/17/21  Yes Farhad Patino, APRN - CNP   Multiple Vitamins-Minerals (THERAPEUTIC MULTIVITAMIN-MINERALS) tablet Take 1 tablet by mouth daily   Yes Historical Provider, MD   Ferrous Gluconate (IRON 27 PO) Take by mouth three times a week Three times a week   Yes Historical Provider, MD        Allergies:       Patient has no known allergies. Social History:     Tobacco:    reports that he has never smoked. He has never used smokeless tobacco.  Alcohol:      reports that he does not currently use alcohol. Drug Use:  reports that he does not currently use drugs.     Family History:     Family History   Problem Relation Age of Onset    Thyroid Disease Mother     Arthritis Mother     Cirrhosis Father         Genetic Cirrhosis    Pacemaker Father        Review of Systems:     Positive and Negative as described in HPI    Review of Systems   Constitutional: Negative. HENT: Negative. Eyes: Negative. Respiratory: Negative. Cardiovascular:  Positive for leg swelling (improved). Negative for chest pain. Gastrointestinal: Negative. Endocrine: Negative. Genitourinary: Negative. Self caths at home   Musculoskeletal: Negative. Skin: Negative. Allergic/Immunologic: Negative. Neurological: Negative. Hematological: Negative. Psychiatric/Behavioral: Negative. Physical Exam:   Vitals:  /88   Pulse 78   Temp 98.8 °F (37.1 °C) (Temporal)   Resp 18   Wt 255 lb 12.8 oz (116 kg)   SpO2 97%   BMI 31.14 kg/m²     Physical Exam  Vitals and nursing note reviewed. Constitutional:       Appearance: Normal appearance. HENT:      Head: Normocephalic. Right Ear: External ear normal.      Left Ear: External ear normal.      Nose: Nose normal.      Mouth/Throat:      Mouth: Mucous membranes are moist.      Pharynx: Oropharynx is clear. Eyes:      Extraocular Movements: Extraocular movements intact. Pupils: Pupils are equal, round, and reactive to light. Neck:      Vascular: No carotid bruit. Cardiovascular:      Rate and Rhythm: Normal rate and regular rhythm. Heart sounds: Normal heart sounds. No murmur heard. Pulmonary:      Effort: Pulmonary effort is normal.      Breath sounds: Normal breath sounds. Musculoskeletal:         General: Normal range of motion. Cervical back: Normal range of motion and neck supple. Right lower leg: Normal. No swelling. No edema. Left lower leg: Normal. No swelling. No edema. Lymphadenopathy:      Cervical: No cervical adenopathy. Skin:     General: Skin is warm. Capillary Refill: Capillary refill takes less than 2 seconds. Neurological:      General: No focal deficit present. Mental Status: He is alert and oriented to person, place, and time.    Psychiatric:         Mood and Affect: Mood normal.         Behavior: Behavior normal.         Thought Content: Thought content normal.       Data:     Lab Results   Component Value Date/Time     08/26/2022 11:22 AM    K 4.2 08/26/2022 11:22 AM     08/26/2022 11:22 AM    CO2 26 08/26/2022 11:22 AM    BUN 30 08/26/2022 11:22 AM    CREATININE 1.59 08/26/2022 11:22 AM    GLUCOSE 115 08/26/2022 11:22 AM    PROT 6.2 12/20/2020 01:24 PM    LABALBU 3.1 12/20/2020 06:07 AM    BILITOT 0.37 12/20/2020 06:07 AM    ALKPHOS 73 12/20/2020 06:07 AM    AST 7 12/20/2020 06:07 AM    ALT 10 12/20/2020 06:07 AM     Lab Results   Component Value Date/Time    WBC 8.0 04/12/2022 09:54 AM    RBC 5.26 04/12/2022 09:54 AM    HGB 15.7 04/12/2022 09:54 AM    HCT 47.3 04/12/2022 09:54 AM    MCV 89.9 04/12/2022 09:54 AM    MCH 29.8 04/12/2022 09:54 AM    MCHC 33.2 04/12/2022 09:54 AM    RDW 13.2 04/12/2022 09:54 AM     04/12/2022 09:54 AM    MPV 9.3 04/12/2022 09:54 AM     No results found for: TSH  Lab Results   Component Value Date/Time    CHOL 200 12/08/2021 09:49 AM    HDL 38 12/08/2021 09:49 AM       Assessment/Plan:      Diagnosis Orders   1. Essential (primary) hypertension        2. Chronic kidney disease, unspecified CKD stage        3. Need for shingles vaccine  zoster recombinant adjuvanted vaccine [facility]) 50 MCG/0.5ML SUSR injection      4. Colon cancer screening  POCT Fecal Immunochemical Test (FIT)        Discussed hypertension today and Олег Joy does not want to take and additional blood pressure medication. He states he does routinely check his blood pressure at home and will continue to monitor. He is instructed to call our office if readings are above 140/90. He will decrease his salt intake and continue to increase activity as tolerated. 1.  Manuel received counseling on the following healthy behaviors: nutrition, exercise, and medication adherence  2. Patient given educational materials - see patient instructions  3. Was a self-tracking handout given in paper form or via [employer]?  No  If yes, see orders or list here. 4.  Discussed use, benefit, and side effects of prescribed medications. Barriers to medication compliance addressed. All patient questions answered. Pt voiced understanding. 5.  Reviewed prior labs and health maintenance  6. Continue current medications, diet and exercise. Completed Refills   Requested Prescriptions     Signed Prescriptions Disp Refills    zoster recombinant adjuvanted vaccine (SHINGRIX) 50 MCG/0.5ML SUSR injection 0.5 mL 0     Sig: Inject 0.5 mLs into the muscle See Admin Instructions 1 dose now and repeat in 2-6 months         Return in about 6 months (around 5/1/2023).

## 2022-12-22 ENCOUNTER — HOSPITAL ENCOUNTER (OUTPATIENT)
Age: 62
Discharge: HOME | End: 2022-12-22

## 2022-12-22 LAB
ANION GAP SERPL CALCULATED.3IONS-SCNC: 9 MMOL/L (ref 9–17)
BUN BLDV-MCNC: 27 MG/DL (ref 8–23)
BUN/CREAT BLD: 17 (ref 9–20)
CALCIUM SERPL-MCNC: 9.7 MG/DL (ref 8.6–10.4)
CHLORIDE BLD-SCNC: 104 MMOL/L (ref 98–107)
CO2: 26 MMOL/L (ref 20–31)
CREAT SERPL-MCNC: 1.59 MG/DL (ref 0.7–1.2)
GFR SERPL CREATININE-BSD FRML MDRD: 48 ML/MIN/1.73M2
GLUCOSE BLD-MCNC: 93 MG/DL (ref 70–99)
POTASSIUM SERPL-SCNC: 3.8 MMOL/L (ref 3.7–5.3)
SODIUM BLD-SCNC: 139 MMOL/L (ref 135–144)

## 2022-12-22 PROCEDURE — 36415 COLL VENOUS BLD VENIPUNCTURE: CPT

## 2022-12-22 PROCEDURE — 80048 BASIC METABOLIC PNL TOTAL CA: CPT

## 2023-01-23 ENCOUNTER — PATIENT MESSAGE (OUTPATIENT)
Dept: PRIMARY CARE CLINIC | Age: 63
End: 2023-01-23

## 2023-01-23 NOTE — TELEPHONE ENCOUNTER
From: Claus Dinero  To: Floridalma Oscartish  Sent: 1/23/2023 8:58 AM EST  Subject: Inis Cob[facility] Oncology    Florian Victoria,    If I read the results to date it looks like Richardine Dakins has a very serious health threat given the size of the tumor and the involvement of lymph nodes. Serious enough that I feel she needs the best in order to work toward the best possible outcome, whatever that may be. For that reason I want to seek treatment through the [facility] Is that a referral you can make, being [facility] is [facility] the [facility], or is it something I need to set up? Also, how long before the biopsy results will be in hand if I were to set up an appointment there? Thank you for not putting Richardine Dakins down for her fears and for getting us as far a we are as quickly as we are.      Santos Clements

## 2023-01-27 ENCOUNTER — TELEPHONE (OUTPATIENT)
Dept: PRIMARY CARE CLINIC | Age: 63
End: 2023-01-27

## 2023-01-30 DIAGNOSIS — I10 ESSENTIAL (PRIMARY) HYPERTENSION: ICD-10-CM

## 2023-01-30 RX ORDER — AMLODIPINE BESYLATE 10 MG/1
TABLET ORAL
Qty: 90 TABLET | Refills: 0 | Status: SHIPPED | OUTPATIENT
Start: 2023-01-30

## 2023-01-30 NOTE — TELEPHONE ENCOUNTER
Health Maintenance   Topic Date Due    HIV screen  Never done    Colorectal Cancer Screen  Never done    Shingles vaccine (1 of 2) Never done    Flu vaccine (1) 11/01/2023 (Originally 8/1/2022)    COVID-19 Vaccine (3 - Booster for Evan Buenrostro series) 11/01/2023 (Originally 7/7/2021)    Depression Screen  11/01/2023    GFR test (Diabetes, CKD 3-4, OR last GFR 15-59)  12/22/2023    Lipids  12/08/2026    DTaP/Tdap/Td vaccine (2 - Td or Tdap) 04/28/2031    Hepatitis C screen  Completed    Hepatitis A vaccine  Aged Out    Hib vaccine  Aged Out    Meningococcal (ACWY) vaccine  Aged Out    Pneumococcal 0-64 years Vaccine  Aged Out             (applicable per patient's age: Cancer Screenings, Depression Screening, Fall Risk Screening, Immunizations)    LDL Cholesterol (mg/dL)   Date Value   12/08/2021 139 (H)     AST (U/L)   Date Value   12/20/2020 7     ALT (U/L)   Date Value   12/20/2020 10     BUN (mg/dL)   Date Value   12/22/2022 27 (H)      (goal A1C is < 7)   (goal LDL is <100) need 30-50% reduction from baseline     BP Readings from Last 3 Encounters:   11/01/22 136/88   08/31/22 (!) 146/90   04/27/22 130/84    (goal /80)      All Future Testing planned in [facility]:  Lab Frequency Next Occurrence   US RENAL COMPLETE Once 03/02/2023   POCT Fecal Immunochemical Test (FIT) Once 11/22/2022       Next Visit Date:  Future Appointments   Date Time Provider Liliane Alves   3/17/2023  9:00 AM SCHEDULE, [facility]   5/1/2023  9:00 AM SHARON Melo - CNP [facility]            Patient Active Problem List:     Bladder obstruction     Acute kidney injury ([facility] [address]     Leg edema, right     Acute deep vein thrombosis (DVT) of left peroneal vein (HCC)     Essential hypertension     Normocytic anemia     Stage 3b chronic kidney disease ([facility] [address]

## 2023-02-23 ENCOUNTER — HOSPITAL ENCOUNTER (OUTPATIENT)
Dept: ULTRASOUND IMAGING | Age: 63
Discharge: HOME | End: 2023-02-25

## 2023-02-23 DIAGNOSIS — N32.0 HYDRONEPHROSIS DUE TO OBSTRUCTION OF BLADDER: ICD-10-CM

## 2023-02-23 DIAGNOSIS — N13.30 HYDRONEPHROSIS DUE TO OBSTRUCTION OF BLADDER: ICD-10-CM

## 2023-02-23 PROCEDURE — 76770 US EXAM ABDO BACK WALL COMP: CPT

## 2023-04-03 NOTE — PROGRESS NOTES
Physical Therapy    Facility/Department: [facility] 4B STEPDOWN  Initial Assessment    NAME: Cheryl Lo  : 1960  MRN: [id number]    Date of Service: 2020    Discharge Recommendations: No further therapy required at discharge. PT Equipment Recommendations  Equipment Needed: No    Assessment   Body structures, Functions, Activity limitations: Decreased functional mobility ; Decreased balance;Decreased endurance  Assessment: The pt ambulated 300ft with RW and CGA. Recommend continued acute PT to progress toward prior level of independence. Prognosis: Good  Decision Making: Medium Complexity  PT Education: Goals;PT Role;Plan of Care; Functional Mobility Training  REQUIRES PT FOLLOW UP: Yes  Activity Tolerance  Activity Tolerance: Patient limited by endurance       Patient Diagnosis(es): There were no encounter diagnoses. has no past medical history on file. has a past surgical history that includes Cholecystectomy. Restrictions  Restrictions/Precautions  Required Braces or Orthoses?: No  Position Activity Restriction  Other position/activity restrictions: up with assist  Vision/Hearing  Vision: Impaired  Vision Exceptions: Wears glasses at all times  Hearing: Within functional limits     Subjective  General  Patient assessed for rehabilitation services?: Yes  Response To Previous Treatment: Not applicable  Family / Caregiver Present: No  Follows Commands: Within Functional Limits  Subjective  Subjective: RN and pt agreeable to PT. Pt supine in bed upon arrival, pleasant and cooperative throughout. Pain Screening  Patient Currently in Pain: Yes  Pain Assessment  Pain Assessment: 0-10  Pain Level: 6  Pain Type: Acute pain  Pain Location: Abdomen  Pain Orientation: Lower  Pain Descriptors: Aching;Discomfort; Sore  Non-Pharmaceutical Pain Intervention(s): Ambulation/Increased Activity  Response to Pain Intervention: Patient Satisfied  Vital Signs  Patient Currently in Pain: Yes       Orientation Orientation  Overall Orientation Status: Within Functional Limits  Social/Functional History  Social/Functional History  Lives With: Spouse(in good health, able to assist as needed)  Type of Home: House  Home Layout: One level  Home Access: Stairs to enter without rails  Entrance Stairs - Number of Steps: 3  Bathroom Shower/Tub: Tub/Shower unit  Bathroom Toilet: Standard  Home Equipment: (no DME use at baseline)  ADL Assistance: [address]: Independent  Homemaking Responsibilities: Yes  Ambulation Assistance: Independent  Transfer Assistance: Independent  Active : Yes  Mode of Transportation: [employer]  Occupation: Full time employment  Type of occupation:   Leisure & Hobbies: pt enjoys reading, eating, bike ride, walk  Additional Comments: Pt reports wife is home majority of the time  Cognition   Cognition  Overall Cognitive Status: WFL    Objective          Joint Mobility  Spine: WFL  ROM RLE: WFL  ROM LLE: WFL  ROM RUE: WFL  ROM LUE: WFL  Strength RLE  Strength RLE: WFL  Strength LLE  Strength LLE: WFL  Strength RUE  Strength RUE: WFL  Strength LUE  Strength LUE: WFL  Tone RLE  RLE Tone: Normotonic  Tone LLE  LLE Tone: Normotonic  Motor Control  Gross Motor?: WFL  Sensation  Overall Sensation Status: WFL  Bed mobility  Supine to Sit: Modified independent  Sit to Supine: Modified independent  Scooting: Modified independent  Comment: HOB elevated ~40 degrees  Transfers  Sit to Stand: Contact guard assistance  Stand to sit: Contact guard assistance  Stand Pivot Transfers: Contact guard assistance  Comment: Pt c/o light headedness with standing, improves within ~1 minute of standing  Ambulation  Ambulation?: Yes  Ambulation 1  Surface: level tile  Device: Rolling Walker  Assistance: Contact guard assistance  Gait Deviations: Slow Maye;Decreased step length;Decreased step height  Distance: 300ft  Comments: Pt reports high reliance on RW  Stairs/Curb  Stairs?: No     Balance Posture: Good  Sitting - Static: Good  Sitting - Dynamic: Good  Standing - Static: Good;-  Standing - Dynamic: Fair;+  Comments: standing balance assessed with RW        Plan   Plan  Times per week: 3-5x/wk  Current Treatment Recommendations: Strengthening, ROM, Balance Training, Functional Mobility Training, Transfer Training, Stair training, Endurance Training, Home Exercise Program, Safety Education & Training, Patient/Caregiver Education & Training, Gait Training  Safety Devices  Type of devices: Nurse notified, Call light within reach, Gait belt, Left in chair  Restraints  Initially in place: No      AM-PAC Score  AM-PAC Inpatient Mobility Raw Score : 19 (12/22/20 1604)  AM-PAC Inpatient T-Scale Score : 45.44 (12/22/20 1604)  Mobility Inpatient CMS 0-100% Score: 41.77 (12/22/20 1604)  Mobility Inpatient CMS G-Code Modifier : CK (12/22/20 1604)          Goals  Short term goals  Time Frame for Short term goals: 14 visits  Short term goal 1: Perform bed mobility and functional transfers independently  Short term goal 2: Ambulate 300ft without AD independently  Short term goal 3: Ascend/descend 3 steps without HR independently  Short term goal 4: Demo Good dynamic standing balance to decrease risk of falls       Therapy Time   Individual Concurrent Group Co-treatment   Time In 1435         Time Out 1453         Minutes 18         Timed Code Treatment Minutes: 8 Minutes       Damari Dunne PT Yes

## 2023-04-30 SDOH — ECONOMIC STABILITY: HOUSING INSECURITY
IN THE LAST 12 MONTHS, WAS THERE A TIME WHEN YOU DID NOT HAVE A STEADY PLACE TO SLEEP OR SLEPT IN A SHELTER (INCLUDING NOW)?: NO

## 2023-04-30 SDOH — ECONOMIC STABILITY: FOOD INSECURITY: WITHIN THE PAST 12 MONTHS, THE FOOD YOU BOUGHT JUST DIDN'T LAST AND YOU DIDN'T HAVE MONEY TO GET MORE.: NEVER TRUE

## 2023-04-30 SDOH — ECONOMIC STABILITY: FOOD INSECURITY: WITHIN THE PAST 12 MONTHS, YOU WORRIED THAT YOUR FOOD WOULD RUN OUT BEFORE YOU GOT MONEY TO BUY MORE.: NEVER TRUE

## 2023-04-30 SDOH — ECONOMIC STABILITY: TRANSPORTATION INSECURITY
IN THE PAST 12 MONTHS, HAS LACK OF TRANSPORTATION KEPT YOU FROM MEETINGS, WORK, OR FROM GETTING THINGS NEEDED FOR DAILY LIVING?: NO

## 2023-04-30 SDOH — ECONOMIC STABILITY: INCOME INSECURITY: HOW HARD IS IT FOR YOU TO PAY FOR THE VERY BASICS LIKE FOOD, HOUSING, MEDICAL CARE, AND HEATING?: NOT HARD AT ALL

## 2023-05-01 ENCOUNTER — OFFICE VISIT (OUTPATIENT)
Dept: PRIMARY CARE CLINIC | Age: 63
End: 2023-05-01

## 2023-05-01 ENCOUNTER — TELEPHONE (OUTPATIENT)
Dept: PRIMARY CARE CLINIC | Age: 63
End: 2023-05-01

## 2023-05-01 VITALS
DIASTOLIC BLOOD PRESSURE: 98 MMHG | RESPIRATION RATE: 18 BRPM | SYSTOLIC BLOOD PRESSURE: 136 MMHG | BODY MASS INDEX: 31.51 KG/M2 | HEIGHT: 76 IN | OXYGEN SATURATION: 96 % | HEART RATE: 87 BPM | TEMPERATURE: 99 F | WEIGHT: 258.8 LBS

## 2023-05-01 DIAGNOSIS — E78.5 DYSLIPIDEMIA: ICD-10-CM

## 2023-05-01 DIAGNOSIS — Z12.11 COLON CANCER SCREENING: ICD-10-CM

## 2023-05-01 DIAGNOSIS — I10 ESSENTIAL (PRIMARY) HYPERTENSION: Primary | ICD-10-CM

## 2023-05-01 DIAGNOSIS — N18.9 CHRONIC KIDNEY DISEASE, UNSPECIFIED CKD STAGE: ICD-10-CM

## 2023-05-01 LAB
CONTROL: PRESENT
HEMOCCULT STL QL: NEGATIVE

## 2023-05-01 PROCEDURE — 82274 ASSAY TEST FOR BLOOD FECAL: CPT | Performed by: NURSE PRACTITIONER

## 2023-05-01 PROCEDURE — 99213 OFFICE O/P EST LOW 20 MIN: CPT | Performed by: NURSE PRACTITIONER

## 2023-05-01 PROCEDURE — 1123F ACP DISCUSS/DSCN MKR DOCD: CPT | Performed by: NURSE PRACTITIONER

## 2023-05-01 PROCEDURE — 3080F DIAST BP >= 90 MM HG: CPT | Performed by: NURSE PRACTITIONER

## 2023-05-01 PROCEDURE — 3075F SYST BP GE 130 - 139MM HG: CPT | Performed by: NURSE PRACTITIONER

## 2023-05-01 RX ORDER — AMLODIPINE BESYLATE 10 MG/1
10 TABLET ORAL DAILY
Qty: 90 TABLET | Refills: 1 | Status: SHIPPED | OUTPATIENT
Start: 2023-05-01

## 2023-05-01 ASSESSMENT — ENCOUNTER SYMPTOMS
GASTROINTESTINAL NEGATIVE: 1
ALLERGIC/IMMUNOLOGIC NEGATIVE: 1
RESPIRATORY NEGATIVE: 1
EYES NEGATIVE: 1

## 2023-05-01 ASSESSMENT — PATIENT HEALTH QUESTIONNAIRE - PHQ9
SUM OF ALL RESPONSES TO PHQ QUESTIONS 1-9: 0
2. FEELING DOWN, DEPRESSED OR HOPELESS: 0
1. LITTLE INTEREST OR PLEASURE IN DOING THINGS: 0
SUM OF ALL RESPONSES TO PHQ QUESTIONS 1-9: 0
SUM OF ALL RESPONSES TO PHQ QUESTIONS 1-9: 0
SUM OF ALL RESPONSES TO PHQ9 QUESTIONS 1 & 2: 0
SUM OF ALL RESPONSES TO PHQ QUESTIONS 1-9: 0

## 2023-05-01 NOTE — PROGRESS NOTES
[facility]  Juanito Person, [address] [facility] PRIMARY CARE  [address]  Dept: [phone]  Dept Fax: [phone]      Name: Azul Hargrove  : 1960         Chief Complaint:     Chief Complaint   Patient presents with    Hypertension     6 month check. No concerns. History of Present Illness:      Azul Hargrove is a 70 y.o.  male who presents with Hypertension (6 month check. No concerns. )      GUERLINE Dahlchai Leelissett is here today for a routine office visit. He states he has been doing well. He is aware of his blood pressure being elevated at this time. He states that he has been under a lot of stress recently. His wife was diagnosed with stage 4 breast cancer. He is moving this week and continues to work full time as a . He has recently missed his Nephrology appointment due to scheduling conflict. He is calling today to reschedule. Steve Hall states he has been feeling well and has no new concerns today. Past Medical History:     Past Medical History:   Diagnosis Date    NICOLE (acute kidney injury) ([address]     Chronic kidney disease     COVID-19 2021    Family history of thyroid disease     History of pneumonia     Hx of blood clots     BLE DVTs    Hypertension     Pulmonary embolism ([address]     \"Approximately 8 years ago. \"     Wellness examination     pcp=Farhad Patino-[facility] office-last visit 2021    Wellness examination     hematology-Dr Leblanc-[facility]-last visit Da 2021    Wellness examination     nephrology-Dr Villafana-last visit 2021      Reviewed all health maintenance requirements and ordered appropriate tests  There are no preventive care reminders to display for this patient.       Past Surgical History:     Past Surgical History:   Procedure Laterality Date    CHOLECYSTECTOMY      CYSTOGRAPHY N/A 2021    URODYNAMICS performed by Tomasa Suarez MD at [address]      with videourodynamics    CYSTOSCOPY N/A 2021

## 2023-05-01 NOTE — PATIENT INSTRUCTIONS
SURVEY:     You may be receiving a survey from [employer] regarding your visit today. Please complete the survey to enable us to provide the highest quality of care to you and your family. If you cannot score us a very good on any question, please call the office to discuss how we could have made your experience a very good one.      Thank you,    Ana M Graves, APRN-CNP  Nessa Uribe, APRN-CNP  Antonio Mcdonald, EDU Augustine, CLARIBEL Cornejo, CLARIBEL Saeed, CMA  Delfina, MATTHEW Mensah, PM

## 2023-05-01 NOTE — TELEPHONE ENCOUNTER
----- Message from SHARON Khalil - CNP sent at 5/1/2023 10:01 AM EDT -----  Please notify patient of normal lab results.   Thanks Donovan

## 2023-05-31 ENCOUNTER — OFFICE VISIT (OUTPATIENT)
Dept: PRIMARY CARE CLINIC | Age: 63
End: 2023-05-31
Payer: MEDICARE

## 2023-05-31 VITALS
WEIGHT: 252.2 LBS | SYSTOLIC BLOOD PRESSURE: 136 MMHG | RESPIRATION RATE: 16 BRPM | OXYGEN SATURATION: 98 % | BODY MASS INDEX: 30.7 KG/M2 | TEMPERATURE: 98.5 F | HEART RATE: 58 BPM | DIASTOLIC BLOOD PRESSURE: 88 MMHG

## 2023-05-31 DIAGNOSIS — I10 ESSENTIAL (PRIMARY) HYPERTENSION: Primary | ICD-10-CM

## 2023-05-31 PROBLEM — I82.452 ACUTE DEEP VEIN THROMBOSIS (DVT) OF LEFT PERONEAL VEIN (HCC): Status: RESOLVED | Noted: 2020-12-21 | Resolved: 2023-05-31

## 2023-05-31 PROCEDURE — G8417 CALC BMI ABV UP PARAM F/U: HCPCS | Performed by: NURSE PRACTITIONER

## 2023-05-31 PROCEDURE — 3079F DIAST BP 80-89 MM HG: CPT | Performed by: NURSE PRACTITIONER

## 2023-05-31 PROCEDURE — 99213 OFFICE O/P EST LOW 20 MIN: CPT | Performed by: NURSE PRACTITIONER

## 2023-05-31 PROCEDURE — 1036F TOBACCO NON-USER: CPT | Performed by: NURSE PRACTITIONER

## 2023-05-31 PROCEDURE — 3017F COLORECTAL CA SCREEN DOC REV: CPT | Performed by: NURSE PRACTITIONER

## 2023-05-31 PROCEDURE — G8427 DOCREV CUR MEDS BY ELIG CLIN: HCPCS | Performed by: NURSE PRACTITIONER

## 2023-05-31 PROCEDURE — 3075F SYST BP GE 130 - 139MM HG: CPT | Performed by: NURSE PRACTITIONER

## 2023-05-31 PROCEDURE — 1123F ACP DISCUSS/DSCN MKR DOCD: CPT | Performed by: NURSE PRACTITIONER

## 2023-05-31 ASSESSMENT — ENCOUNTER SYMPTOMS
GASTROINTESTINAL NEGATIVE: 1
RESPIRATORY NEGATIVE: 1
EYES NEGATIVE: 1
ALLERGIC/IMMUNOLOGIC NEGATIVE: 1

## 2023-05-31 ASSESSMENT — PATIENT HEALTH QUESTIONNAIRE - PHQ9
SUM OF ALL RESPONSES TO PHQ QUESTIONS 1-9: 0
1. LITTLE INTEREST OR PLEASURE IN DOING THINGS: 0
2. FEELING DOWN, DEPRESSED OR HOPELESS: 0
SUM OF ALL RESPONSES TO PHQ QUESTIONS 1-9: 0
SUM OF ALL RESPONSES TO PHQ9 QUESTIONS 1 & 2: 0
SUM OF ALL RESPONSES TO PHQ QUESTIONS 1-9: 0
SUM OF ALL RESPONSES TO PHQ QUESTIONS 1-9: 0

## 2023-05-31 NOTE — PATIENT INSTRUCTIONS
SURVEY:     You may be receiving a survey from [employer] regarding your visit today. Please complete the survey to enable us to provide the highest quality of care to you and your family. If you cannot score us a very good on any question, please call the office to discuss how we could have made your experience a very good one.      Thank you,    Aria Graves, APRN-EVELINE Joy, APRN-CNP  EDU Huggins, CMA  Rupa, CMA  Darlin, CMA  Delfina, PCA  Makenna, PM

## 2023-05-31 NOTE — PROGRESS NOTES
Results   Component Value Date/Time    CHOL 200 12/08/2021 09:49 AM    HDL 38 12/08/2021 09:49 AM       Assessment/Plan:      Diagnosis Orders   1. Essential (primary) hypertension          Continue Norvasc 10 mg daily. Continue lifestyle modifications. 1.  Manuel received counseling on the following healthy behaviors: nutrition, exercise, and medication adherence  2. Patient given educational materials - see patient instructions  3. Was a self-tracking handout given in paper form or via [employer]? No  If yes, see orders or list here. 4.  Discussed use, benefit, and side effects of prescribed medications. Barriers to medication compliance addressed. All patient questions answered. Pt voiced understanding. 5.  Reviewed prior labs and health maintenance  6. Continue current medications, diet and exercise. Completed Refills   Requested Prescriptions      No prescriptions requested or ordered in this encounter         Return in about 6 months (around 11/30/2023).

## 2023-06-09 ENCOUNTER — OFFICE VISIT (OUTPATIENT)
Dept: UROLOGY | Age: 63
End: 2023-06-09
Payer: MEDICARE

## 2023-06-09 VITALS
WEIGHT: 252 LBS | HEART RATE: 67 BPM | BODY MASS INDEX: 30.69 KG/M2 | HEIGHT: 76 IN | DIASTOLIC BLOOD PRESSURE: 100 MMHG | SYSTOLIC BLOOD PRESSURE: 158 MMHG

## 2023-06-09 DIAGNOSIS — N40.1 BENIGN PROSTATIC HYPERPLASIA WITH URINARY RETENTION: Primary | ICD-10-CM

## 2023-06-09 DIAGNOSIS — N31.2 ATONIC BLADDER: ICD-10-CM

## 2023-06-09 DIAGNOSIS — R33.8 BENIGN PROSTATIC HYPERPLASIA WITH URINARY RETENTION: Primary | ICD-10-CM

## 2023-06-09 DIAGNOSIS — N32.0 HYDRONEPHROSIS DUE TO OBSTRUCTION OF BLADDER: ICD-10-CM

## 2023-06-09 DIAGNOSIS — N13.30 HYDRONEPHROSIS DUE TO OBSTRUCTION OF BLADDER: ICD-10-CM

## 2023-06-09 PROCEDURE — 1036F TOBACCO NON-USER: CPT | Performed by: UROLOGY

## 2023-06-09 PROCEDURE — 3080F DIAST BP >= 90 MM HG: CPT | Performed by: UROLOGY

## 2023-06-09 PROCEDURE — 3017F COLORECTAL CA SCREEN DOC REV: CPT | Performed by: UROLOGY

## 2023-06-09 PROCEDURE — 1123F ACP DISCUSS/DSCN MKR DOCD: CPT | Performed by: UROLOGY

## 2023-06-09 PROCEDURE — 99214 OFFICE O/P EST MOD 30 MIN: CPT | Performed by: UROLOGY

## 2023-06-09 PROCEDURE — G8417 CALC BMI ABV UP PARAM F/U: HCPCS | Performed by: UROLOGY

## 2023-06-09 PROCEDURE — G8427 DOCREV CUR MEDS BY ELIG CLIN: HCPCS | Performed by: UROLOGY

## 2023-06-09 PROCEDURE — 3077F SYST BP >= 140 MM HG: CPT | Performed by: UROLOGY

## 2023-06-09 NOTE — PROGRESS NOTES
Rosalba Dexter, [address] [facility], Johnathan Mercado [place]. [facility] U[facility] Clinic Consultation / Follow up Visit    Patient:  Bert Gibbs  YOB: 1960  Date: 6/9/2023  Consult requested from [facility] for purpose of evaluation of Bladder obstruction. HISTORY OF PRESENT ILLNESS:   The patient is a 70 y.o. male who presents today for follow-up for the following problem(s): High volume urinary retention  Overall the problem(s) : are worsening. Associated Symptoms: No dysuria, gross hematuria. 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 He had cysto UDS which did not show an obstructive prostate. He has poor function of bladder, and is doing well on CIC regimen. RJ demonstrates resolution of BL hydronephrosis. Cr continues to improve, 1.59, 1.61.      1/8/21  Presents after high volume retention of 8 L. Has catheter in place. 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 Off dialysis, and appears to have residual CKD. Summary of old records:   (Patient's old records, notes and chart reviewed and summarized above.)    Last several PSA's:  No results found for: PSA    Last total testosterone:  No results found for: TESTOSTERONE    Urinalysis today:  No results found for this visit on 06/09/23.       Last BUN and creatinine:  Lab Results   Component Value Date    BUN 27 (H) 12/22/2022     Lab Results   Component Value Date    CREATININE 1.59 (H) 12/22/2022       Imaging Reviewed during this Office Visit:   (results were independently reviewed by physician and radiology report verified)    PAST MEDICAL, FAMILY AND SOCIAL HISTORY:  Past Medical History:   Diagnosis Date    NICOLE (acute kidney injury) ([address]     Chronic kidney disease     COVID-19 11/2021    Family history of thyroid disease     History of pneumonia     Hx of blood clots     BLE DVTs    Hypertension

## 2023-06-27 ENCOUNTER — HOSPITAL ENCOUNTER (OUTPATIENT)
Age: 63
Discharge: HOME | End: 2023-06-27
Payer: MEDICARE

## 2023-06-27 DIAGNOSIS — E78.5 DYSLIPIDEMIA: ICD-10-CM

## 2023-06-27 LAB
25(OH)D3 SERPL-MCNC: 36.9 NG/ML
ANION GAP SERPL CALCULATED.3IONS-SCNC: 9 MMOL/L (ref 9–17)
BUN SERPL-MCNC: 24 MG/DL (ref 8–23)
BUN/CREAT SERPL: 16 (ref 9–20)
CA-I BLD-SCNC: 1.25 MMOL/L (ref 1.13–1.33)
CALCIUM SERPL-MCNC: 9.4 MG/DL (ref 8.6–10.4)
CHLORIDE SERPL-SCNC: 102 MMOL/L (ref 98–107)
CHOLEST SERPL-MCNC: 185 MG/DL
CHOLESTEROL/HDL RATIO: 4.5
CO2 SERPL-SCNC: 28 MMOL/L (ref 20–31)
CREAT SERPL-MCNC: 1.52 MG/DL (ref 0.7–1.2)
CREAT UR-MCNC: 76 MG/DL (ref 39–259)
GFR SERPL CREATININE-BSD FRML MDRD: 51 ML/MIN/1.73M2
GLUCOSE SERPL-MCNC: 90 MG/DL (ref 70–99)
HDLC SERPL-MCNC: 41 MG/DL
LDLC SERPL CALC-MCNC: 124 MG/DL (ref 0–130)
PHOSPHATE SERPL-MCNC: 2.9 MG/DL (ref 2.5–4.5)
POTASSIUM SERPL-SCNC: 4.1 MMOL/L (ref 3.7–5.3)
PTH-INTACT SERPL-MCNC: 127.2 PG/ML (ref 14–72)
SODIUM SERPL-SCNC: 139 MMOL/L (ref 135–144)
TOTAL PROTEIN, URINE: 58 MG/DL
TRIGL SERPL-MCNC: 101 MG/DL

## 2023-06-27 PROCEDURE — 84156 ASSAY OF PROTEIN URINE: CPT

## 2023-06-27 PROCEDURE — 80061 LIPID PANEL: CPT

## 2023-06-27 PROCEDURE — 80048 BASIC METABOLIC PNL TOTAL CA: CPT

## 2023-06-27 PROCEDURE — 83970 ASSAY OF PARATHORMONE: CPT

## 2023-06-27 PROCEDURE — 36415 COLL VENOUS BLD VENIPUNCTURE: CPT

## 2023-06-27 PROCEDURE — 82570 ASSAY OF URINE CREATININE: CPT

## 2023-06-27 PROCEDURE — 82330 ASSAY OF CALCIUM: CPT

## 2023-06-27 PROCEDURE — 84100 ASSAY OF PHOSPHORUS: CPT

## 2023-06-27 PROCEDURE — 82306 VITAMIN D 25 HYDROXY: CPT

## 2023-06-28 ENCOUNTER — TELEPHONE (OUTPATIENT)
Dept: PRIMARY CARE CLINIC | Age: 63
End: 2023-06-28

## 2023-09-27 ENCOUNTER — OFFICE VISIT (OUTPATIENT)
Dept: PRIMARY CARE CLINIC | Age: 63
End: 2023-09-27
Payer: MEDICARE

## 2023-09-27 ENCOUNTER — HOSPITAL ENCOUNTER (OUTPATIENT)
Age: 63
Setting detail: SPECIMEN
Discharge: HOME | End: 2023-09-27

## 2023-09-27 VITALS
OXYGEN SATURATION: 100 % | BODY MASS INDEX: 31.86 KG/M2 | WEIGHT: 261.7 LBS | DIASTOLIC BLOOD PRESSURE: 92 MMHG | HEART RATE: 92 BPM | SYSTOLIC BLOOD PRESSURE: 152 MMHG | TEMPERATURE: 97.8 F

## 2023-09-27 DIAGNOSIS — J02.0 STREP PHARYNGITIS: ICD-10-CM

## 2023-09-27 DIAGNOSIS — T78.3XXA ANGIOEDEMA, INITIAL ENCOUNTER: Primary | ICD-10-CM

## 2023-09-27 DIAGNOSIS — T78.3XXA ANGIOEDEMA, INITIAL ENCOUNTER: ICD-10-CM

## 2023-09-27 LAB
ERYTHROCYTE [SEDIMENTATION RATE] IN BLOOD BY PHOTOMETRIC METHOD: 6 MM/HR (ref 0–20)
S PYO AG THROAT QL: POSITIVE

## 2023-09-27 PROCEDURE — 99214 OFFICE O/P EST MOD 30 MIN: CPT | Performed by: NURSE PRACTITIONER

## 2023-09-27 PROCEDURE — 1123F ACP DISCUSS/DSCN MKR DOCD: CPT | Performed by: NURSE PRACTITIONER

## 2023-09-27 PROCEDURE — G8427 DOCREV CUR MEDS BY ELIG CLIN: HCPCS | Performed by: NURSE PRACTITIONER

## 2023-09-27 PROCEDURE — 87880 STREP A ASSAY W/OPTIC: CPT | Performed by: NURSE PRACTITIONER

## 2023-09-27 PROCEDURE — 3078F DIAST BP <80 MM HG: CPT | Performed by: NURSE PRACTITIONER

## 2023-09-27 PROCEDURE — 1036F TOBACCO NON-USER: CPT | Performed by: NURSE PRACTITIONER

## 2023-09-27 PROCEDURE — 3017F COLORECTAL CA SCREEN DOC REV: CPT | Performed by: NURSE PRACTITIONER

## 2023-09-27 PROCEDURE — G8417 CALC BMI ABV UP PARAM F/U: HCPCS | Performed by: NURSE PRACTITIONER

## 2023-09-27 PROCEDURE — 3074F SYST BP LT 130 MM HG: CPT | Performed by: NURSE PRACTITIONER

## 2023-09-27 RX ORDER — PREDNISONE 20 MG/1
40 TABLET ORAL ONCE
Qty: 2 TABLET | Refills: 0 | Status: SHIPPED | OUTPATIENT
Start: 2023-09-27 | End: 2023-09-27

## 2023-09-27 RX ORDER — AMOXICILLIN 500 MG/1
500 CAPSULE ORAL 2 TIMES DAILY
Qty: 20 CAPSULE | Refills: 0 | Status: SHIPPED | OUTPATIENT
Start: 2023-09-27 | End: 2023-10-07

## 2023-09-27 SDOH — ECONOMIC STABILITY: FOOD INSECURITY: WITHIN THE PAST 12 MONTHS, YOU WORRIED THAT YOUR FOOD WOULD RUN OUT BEFORE YOU GOT MONEY TO BUY MORE.: PATIENT DECLINED

## 2023-09-27 SDOH — ECONOMIC STABILITY: INCOME INSECURITY: HOW HARD IS IT FOR YOU TO PAY FOR THE VERY BASICS LIKE FOOD, HOUSING, MEDICAL CARE, AND HEATING?: PATIENT DECLINED

## 2023-09-27 SDOH — ECONOMIC STABILITY: HOUSING INSECURITY
IN THE LAST 12 MONTHS, WAS THERE A TIME WHEN YOU DID NOT HAVE A STEADY PLACE TO SLEEP OR SLEPT IN A SHELTER (INCLUDING NOW)?: PATIENT REFUSED

## 2023-09-27 SDOH — ECONOMIC STABILITY: FOOD INSECURITY: WITHIN THE PAST 12 MONTHS, THE FOOD YOU BOUGHT JUST DIDN'T LAST AND YOU DIDN'T HAVE MONEY TO GET MORE.: PATIENT DECLINED

## 2023-09-27 ASSESSMENT — PATIENT HEALTH QUESTIONNAIRE - PHQ9
SUM OF ALL RESPONSES TO PHQ QUESTIONS 1-9: 0
SUM OF ALL RESPONSES TO PHQ QUESTIONS 1-9: 0
SUM OF ALL RESPONSES TO PHQ9 QUESTIONS 1 & 2: 0
SUM OF ALL RESPONSES TO PHQ QUESTIONS 1-9: 0
1. LITTLE INTEREST OR PLEASURE IN DOING THINGS: 0
2. FEELING DOWN, DEPRESSED OR HOPELESS: 0
SUM OF ALL RESPONSES TO PHQ QUESTIONS 1-9: 0

## 2023-09-27 NOTE — PATIENT INSTRUCTIONS
SURVEY:     You may be receiving a survey from [employer] regarding your visit today. Please complete the survey to enable us to provide the highest quality of care to you and your family. If you cannot score us a very good on any question, please call the office to discuss how we could have made your experience a very good one.      Thank you,    Evette Graves, APRN-CNP  Kellen Craig, APRN-CNP  Arlen Pickard, JUNN  Rosey Schlatter, CLARIBEL Mckee, CLARIBEL Saeed, CMA  Delfina, PCA  Makenna, PM

## 2023-09-27 NOTE — PROGRESS NOTES
issues. 1.  Manuel received counseling on the following healthy behaviors: medication adherence  2. Patient given educational materials - see patient instructions  3. Was a self-tracking handout given in paper form or via [employer]? No  If yes, see orders or list here. 4.  Discussed use, benefit, and side effects of prescribed medications. Barriers to medication compliance addressed. All patient questions answered. Pt voiced understanding. 5.  Reviewed prior labs and health maintenance  6. Continue current medications, diet and exercise. Completed Refills   Requested Prescriptions     Signed Prescriptions Disp Refills    amoxicillin (AMOXIL) 500 MG capsule 20 capsule 0     Sig: Take 1 capsule by mouth 2 times daily for 10 days    predniSONE (DELTASONE) 20 MG tablet 2 tablet 0     Sig: Take 2 tablets by mouth once for 1 dose         Return if symptoms worsen or fail to improve.

## 2023-09-28 ENCOUNTER — TELEPHONE (OUTPATIENT)
Dept: PRIMARY CARE CLINIC | Age: 63
End: 2023-09-28

## 2023-09-28 LAB
ALBUMIN SERPL-MCNC: 4.1 G/DL (ref 3.5–5.2)
ALBUMIN/GLOB SERPL: 1.3 {RATIO} (ref 1–2.5)
ALP SERPL-CCNC: 101 U/L (ref 40–129)
ALT SERPL-CCNC: 16 U/L (ref 5–41)
ANION GAP SERPL CALCULATED.3IONS-SCNC: 12 MMOL/L (ref 9–17)
AST SERPL-CCNC: 15 U/L
BILIRUB SERPL-MCNC: 1 MG/DL (ref 0.3–1.2)
BUN SERPL-MCNC: 25 MG/DL (ref 8–23)
C4 SERPL-MCNC: 31 MG/DL (ref 10–40)
CALCIUM SERPL-MCNC: 9.3 MG/DL (ref 8.6–10.4)
CHLORIDE SERPL-SCNC: 102 MMOL/L (ref 98–107)
CO2 SERPL-SCNC: 25 MMOL/L (ref 20–31)
CREAT SERPL-MCNC: 1.6 MG/DL (ref 0.7–1.2)
CRP SERPL HS-MCNC: 10.4 MG/L (ref 0–5)
GFR SERPL CREATININE-BSD FRML MDRD: 48 ML/MIN/1.73M2
GLUCOSE SERPL-MCNC: 92 MG/DL (ref 70–99)
POTASSIUM SERPL-SCNC: 4.4 MMOL/L (ref 3.7–5.3)
PROT SERPL-MCNC: 7.3 G/DL (ref 6.4–8.3)
SODIUM SERPL-SCNC: 139 MMOL/L (ref 135–144)

## 2023-09-28 ASSESSMENT — ENCOUNTER SYMPTOMS
VOMITING: 0
DIARRHEA: 0
SWOLLEN GLANDS: 0
SHORTNESS OF BREATH: 0
CHANGE IN BOWEL HABIT: 0
SORE THROAT: 1
TROUBLE SWALLOWING: 0
ABDOMINAL PAIN: 0
FACIAL SWELLING: 1
NAUSEA: 0
COUGH: 0
VISUAL CHANGE: 0

## 2023-09-28 NOTE — TELEPHONE ENCOUNTER
Patient contacted the office to let you know that his swelling has reduced tremendously and he is doing better!

## 2023-10-03 ENCOUNTER — TELEPHONE (OUTPATIENT)
Dept: PRIMARY CARE CLINIC | Age: 63
End: 2023-10-03

## 2023-10-03 NOTE — TELEPHONE ENCOUNTER
----- Message from [facility] Denia, APRN - CNP sent at 9/28/2023 11:27 AM EDT -----  Labs are stable. Some inflammation noted but nothing significant. How is he feeling today?

## 2023-11-02 DIAGNOSIS — I10 ESSENTIAL (PRIMARY) HYPERTENSION: ICD-10-CM

## 2023-11-02 RX ORDER — AMLODIPINE BESYLATE 10 MG/1
10 TABLET ORAL DAILY
Qty: 90 TABLET | Refills: 1 | Status: SHIPPED | OUTPATIENT
Start: 2023-11-02

## 2023-11-02 NOTE — TELEPHONE ENCOUNTER
Acute kidney injury ([address])     Leg edema, right     Essential hypertension     Normocytic anemia     Stage 3b chronic kidney disease ([address])

## 2023-11-27 SDOH — HEALTH STABILITY: PHYSICAL HEALTH: ON AVERAGE, HOW MANY MINUTES DO YOU ENGAGE IN EXERCISE AT THIS LEVEL?: 30 MIN

## 2023-11-27 SDOH — HEALTH STABILITY: PHYSICAL HEALTH: ON AVERAGE, HOW MANY DAYS PER WEEK DO YOU ENGAGE IN MODERATE TO STRENUOUS EXERCISE (LIKE A BRISK WALK)?: 4 DAYS

## 2023-11-27 ASSESSMENT — LIFESTYLE VARIABLES
HOW MANY STANDARD DRINKS CONTAINING ALCOHOL DO YOU HAVE ON A TYPICAL DAY: 0
HOW OFTEN DO YOU HAVE A DRINK CONTAINING ALCOHOL: 1
HOW OFTEN DO YOU HAVE A DRINK CONTAINING ALCOHOL: NEVER
HOW OFTEN DO YOU HAVE SIX OR MORE DRINKS ON ONE OCCASION: 1
HOW MANY STANDARD DRINKS CONTAINING ALCOHOL DO YOU HAVE ON A TYPICAL DAY: PATIENT DOES NOT DRINK

## 2023-11-27 ASSESSMENT — PATIENT HEALTH QUESTIONNAIRE - PHQ9
1. LITTLE INTEREST OR PLEASURE IN DOING THINGS: 1
SUM OF ALL RESPONSES TO PHQ9 QUESTIONS 1 & 2: 2
SUM OF ALL RESPONSES TO PHQ QUESTIONS 1-9: 2
2. FEELING DOWN, DEPRESSED OR HOPELESS: 1
SUM OF ALL RESPONSES TO PHQ QUESTIONS 1-9: 2

## 2023-11-30 ENCOUNTER — OFFICE VISIT (OUTPATIENT)
Dept: PRIMARY CARE CLINIC | Age: 63
End: 2023-11-30
Payer: MEDICARE

## 2023-11-30 VITALS
OXYGEN SATURATION: 96 % | RESPIRATION RATE: 16 BRPM | HEART RATE: 58 BPM | WEIGHT: 266.6 LBS | SYSTOLIC BLOOD PRESSURE: 128 MMHG | TEMPERATURE: 98.9 F | HEIGHT: 76 IN | DIASTOLIC BLOOD PRESSURE: 84 MMHG | BODY MASS INDEX: 32.47 KG/M2

## 2023-11-30 DIAGNOSIS — I10 ESSENTIAL (PRIMARY) HYPERTENSION: ICD-10-CM

## 2023-11-30 DIAGNOSIS — Z00.00 WELCOME TO MEDICARE PREVENTIVE VISIT: Primary | ICD-10-CM

## 2023-11-30 DIAGNOSIS — K42.9 UMBILICAL HERNIA WITHOUT OBSTRUCTION AND WITHOUT GANGRENE: ICD-10-CM

## 2023-11-30 DIAGNOSIS — N18.9 CHRONIC KIDNEY DISEASE, UNSPECIFIED CKD STAGE: ICD-10-CM

## 2023-11-30 PROCEDURE — G8484 FLU IMMUNIZE NO ADMIN: HCPCS | Performed by: NURSE PRACTITIONER

## 2023-11-30 PROCEDURE — G8417 CALC BMI ABV UP PARAM F/U: HCPCS | Performed by: NURSE PRACTITIONER

## 2023-11-30 PROCEDURE — 1123F ACP DISCUSS/DSCN MKR DOCD: CPT | Performed by: NURSE PRACTITIONER

## 2023-11-30 PROCEDURE — 1036F TOBACCO NON-USER: CPT | Performed by: NURSE PRACTITIONER

## 2023-11-30 PROCEDURE — 3074F SYST BP LT 130 MM HG: CPT | Performed by: NURSE PRACTITIONER

## 2023-11-30 PROCEDURE — 99214 OFFICE O/P EST MOD 30 MIN: CPT | Performed by: NURSE PRACTITIONER

## 2023-11-30 PROCEDURE — 3017F COLORECTAL CA SCREEN DOC REV: CPT | Performed by: NURSE PRACTITIONER

## 2023-11-30 PROCEDURE — 3079F DIAST BP 80-89 MM HG: CPT | Performed by: NURSE PRACTITIONER

## 2023-11-30 PROCEDURE — G8427 DOCREV CUR MEDS BY ELIG CLIN: HCPCS | Performed by: NURSE PRACTITIONER

## 2023-11-30 PROCEDURE — G0402 INITIAL PREVENTIVE EXAM: HCPCS | Performed by: NURSE PRACTITIONER

## 2024-01-08 ENCOUNTER — OFFICE VISIT (OUTPATIENT)
Dept: SURGERY | Age: 64
End: 2024-01-08
Payer: MEDICARE

## 2024-01-08 VITALS
DIASTOLIC BLOOD PRESSURE: 97 MMHG | SYSTOLIC BLOOD PRESSURE: 159 MMHG | WEIGHT: 260 LBS | HEART RATE: 86 BPM | BODY MASS INDEX: 31.65 KG/M2

## 2024-01-08 DIAGNOSIS — K43.2 VENTRAL INCISIONAL HERNIA: Primary | ICD-10-CM

## 2024-01-08 PROCEDURE — 1036F TOBACCO NON-USER: CPT | Performed by: SURGERY

## 2024-01-08 PROCEDURE — G8417 CALC BMI ABV UP PARAM F/U: HCPCS | Performed by: SURGERY

## 2024-01-08 PROCEDURE — 3077F SYST BP >= 140 MM HG: CPT | Performed by: SURGERY

## 2024-01-08 PROCEDURE — G8427 DOCREV CUR MEDS BY ELIG CLIN: HCPCS | Performed by: SURGERY

## 2024-01-08 PROCEDURE — 99203 OFFICE O/P NEW LOW 30 MIN: CPT | Performed by: SURGERY

## 2024-01-08 PROCEDURE — 99211 OFF/OP EST MAY X REQ PHY/QHP: CPT

## 2024-01-08 PROCEDURE — 3080F DIAST BP >= 90 MM HG: CPT | Performed by: SURGERY

## 2024-01-08 PROCEDURE — G8484 FLU IMMUNIZE NO ADMIN: HCPCS | Performed by: SURGERY

## 2024-01-08 PROCEDURE — 1123F ACP DISCUSS/DSCN MKR DOCD: CPT | Performed by: SURGERY

## 2024-01-08 PROCEDURE — 3017F COLORECTAL CA SCREEN DOC REV: CPT | Performed by: SURGERY

## 2024-01-08 ASSESSMENT — ENCOUNTER SYMPTOMS
VOMITING: 0
ABDOMINAL PAIN: 0
CHEST TIGHTNESS: 0
CHOKING: 0
SHORTNESS OF BREATH: 0
ABDOMINAL DISTENTION: 0
COUGH: 0
CONSTIPATION: 0
NAUSEA: 0
DIARRHEA: 0

## 2024-01-08 NOTE — PROGRESS NOTES
Patient has notice a bulge at his umbilicus.  This started after he had a laparoscopic cholecystectomy.  It is getting larger, but is not causing him any pain.  No change in his bowel habit, nausea or vomiting. It has not interfered with his daily activities.    Past Medical History:   Diagnosis Date    NICOLE (acute kidney injury) (HCC)     Chronic kidney disease     COVID-19 11/2021    Family history of thyroid disease     History of pneumonia     Hx of blood clots     BLE DVTs    Hypertension     Pulmonary embolism (HCC)     \"Approximately 8 years ago.\"     Stage 3b chronic kidney disease (HCC) 12/13/2021    Wellness examination     pcp=[facility] office-last visit jan 2021    Wellness examination     hematology-Dr Leblanc[place]-last visit jan 2021    Wellness examination     nephrology-Dr Villafana-last visit jan 2021     Past Surgical History:   Procedure Laterality Date    CHOLECYSTECTOMY      CYSTOGRAPHY N/A 2/12/2021    URODYNAMICS performed by Misael Brito Jr., MD at [facility] OR    CYSTOSCOPY      with videourodynamics    CYSTOSCOPY N/A 2/12/2021    CYSTOSCOPY performed by Misael Brito Jr., MD at [facility] OR    KNEE SURGERY      When pt was early 20's.    TONSILLECTOMY      As a child.     Current Outpatient Medications   Medication Sig Dispense Refill    amLODIPine (NORVASC) 10 MG tablet TAKE 1 TABLET BY MOUTH ONCE A DAY 90 tablet 1    calcitRIOL (ROCALTROL) 0.25 MCG capsule TAKE 1 CAPSULE BY MOUTH 5 TIMES A WEEK ON MONDAY, TUESDAY, WEDNESDAY, THURSDAY, FRIDAY 60 capsule 0    aspirin EC 81 MG EC tablet Take 1 tablet by mouth daily 30 tablet 5    Multiple Vitamins-Minerals (THERAPEUTIC MULTIVITAMIN-MINERALS) tablet Take 1 tablet by mouth daily      Ferrous Gluconate (IRON 27 PO) Take by mouth three times a week Three times a week       No current facility-administered medications for this visit.     No Known Allergies  Social History     Tobacco Use    Smoking status: Never    Smokeless tobacco: Never

## 2024-02-01 ENCOUNTER — HOSPITAL ENCOUNTER (OUTPATIENT)
Age: 64
Setting detail: SPECIMEN
Discharge: HOME | End: 2024-02-01

## 2024-02-01 LAB
BASOPHILS # BLD: 0.06 K/UL (ref 0–0.2)
BASOPHILS NFR BLD: 1 % (ref 0–2)
CA-I BLD-SCNC: 1.21 MMOL/L (ref 1.13–1.33)
CREAT UR-MCNC: 40 MG/DL (ref 39–259)
EOSINOPHIL # BLD: 0.28 K/UL (ref 0–0.44)
EOSINOPHILS RELATIVE PERCENT: 3 % (ref 1–4)
ERYTHROCYTE [DISTWIDTH] IN BLOOD BY AUTOMATED COUNT: 13.1 % (ref 11.8–14.4)
HCT VFR BLD AUTO: 54.5 % (ref 40.7–50.3)
HGB BLD-MCNC: 18.7 G/DL (ref 13–17)
IMM GRANULOCYTES # BLD AUTO: <0.03 K/UL (ref 0–0.3)
IMM GRANULOCYTES NFR BLD: 0 %
LYMPHOCYTES NFR BLD: 3.01 K/UL (ref 1.1–3.7)
LYMPHOCYTES RELATIVE PERCENT: 35 % (ref 24–43)
MCH RBC QN AUTO: 30.7 PG (ref 25.2–33.5)
MCHC RBC AUTO-ENTMCNC: 34.3 G/DL (ref 28.4–34.8)
MCV RBC AUTO: 89.3 FL (ref 82.6–102.9)
MONOCYTES NFR BLD: 0.55 K/UL (ref 0.1–1.2)
MONOCYTES NFR BLD: 6 % (ref 3–12)
NEUTROPHILS NFR BLD: 55 % (ref 36–65)
NEUTS SEG NFR BLD: 4.72 K/UL (ref 1.5–8.1)
NRBC BLD-RTO: 0 PER 100 WBC
PLATELET # BLD AUTO: 323 K/UL (ref 138–453)
PMV BLD AUTO: 9.8 FL (ref 8.1–13.5)
PTH-INTACT SERPL-MCNC: 133.6 PG/ML (ref 14–72)
RBC # BLD AUTO: 6.1 M/UL (ref 4.21–5.77)
TOTAL PROTEIN, URINE: 23 MG/DL
WBC OTHER # BLD: 8.6 K/UL (ref 3.5–11.3)

## 2024-02-02 LAB
25(OH)D3 SERPL-MCNC: 36.4 NG/ML (ref 30–100)
ANION GAP SERPL CALCULATED.3IONS-SCNC: 14 MMOL/L (ref 9–16)
BUN SERPL-MCNC: 24 MG/DL (ref 8–23)
CALCIUM SERPL-MCNC: 9.4 MG/DL (ref 8.6–10.4)
CHLORIDE SERPL-SCNC: 102 MMOL/L (ref 98–107)
CO2 SERPL-SCNC: 25 MMOL/L (ref 20–31)
CREAT SERPL-MCNC: 1.5 MG/DL (ref 0.7–1.2)
GFR SERPL CREATININE-BSD FRML MDRD: 50 ML/MIN/1.73M2
GLUCOSE SERPL-MCNC: 73 MG/DL (ref 74–99)
PHOSPHATE SERPL-MCNC: 2.9 MG/DL (ref 2.5–4.5)
POTASSIUM SERPL-SCNC: 3.7 MMOL/L (ref 3.7–5.3)
SODIUM SERPL-SCNC: 141 MMOL/L (ref 136–145)

## 2024-04-09 ENCOUNTER — OFFICE VISIT (OUTPATIENT)
Dept: PRIMARY CARE CLINIC | Age: 64
End: 2024-04-09
Payer: MEDICARE

## 2024-04-09 VITALS
TEMPERATURE: 99 F | WEIGHT: 260.2 LBS | BODY MASS INDEX: 31.67 KG/M2 | OXYGEN SATURATION: 96 % | HEART RATE: 62 BPM | DIASTOLIC BLOOD PRESSURE: 92 MMHG | RESPIRATION RATE: 16 BRPM | SYSTOLIC BLOOD PRESSURE: 146 MMHG

## 2024-04-09 DIAGNOSIS — J20.9 BRONCHITIS WITH BRONCHOSPASM: ICD-10-CM

## 2024-04-09 DIAGNOSIS — J01.40 ACUTE NON-RECURRENT PANSINUSITIS: Primary | ICD-10-CM

## 2024-04-09 PROCEDURE — 3017F COLORECTAL CA SCREEN DOC REV: CPT | Performed by: NURSE PRACTITIONER

## 2024-04-09 PROCEDURE — G8427 DOCREV CUR MEDS BY ELIG CLIN: HCPCS | Performed by: NURSE PRACTITIONER

## 2024-04-09 PROCEDURE — 1036F TOBACCO NON-USER: CPT | Performed by: NURSE PRACTITIONER

## 2024-04-09 PROCEDURE — 99214 OFFICE O/P EST MOD 30 MIN: CPT | Performed by: NURSE PRACTITIONER

## 2024-04-09 PROCEDURE — G8417 CALC BMI ABV UP PARAM F/U: HCPCS | Performed by: NURSE PRACTITIONER

## 2024-04-09 PROCEDURE — 3080F DIAST BP >= 90 MM HG: CPT | Performed by: NURSE PRACTITIONER

## 2024-04-09 PROCEDURE — 3077F SYST BP >= 140 MM HG: CPT | Performed by: NURSE PRACTITIONER

## 2024-04-09 PROCEDURE — 1123F ACP DISCUSS/DSCN MKR DOCD: CPT | Performed by: NURSE PRACTITIONER

## 2024-04-09 RX ORDER — AMOXICILLIN AND CLAVULANATE POTASSIUM 875; 125 MG/1; MG/1
1 TABLET, FILM COATED ORAL 2 TIMES DAILY
Qty: 20 TABLET | Refills: 0 | Status: SHIPPED | OUTPATIENT
Start: 2024-04-09 | End: 2024-04-19

## 2024-04-09 RX ORDER — BENZONATATE 100 MG/1
100-200 CAPSULE ORAL 3 TIMES DAILY PRN
Qty: 30 CAPSULE | Refills: 0 | Status: SHIPPED | OUTPATIENT
Start: 2024-04-09 | End: 2024-04-16

## 2024-04-09 ASSESSMENT — ENCOUNTER SYMPTOMS
SINUS PAIN: 1
ALLERGIC/IMMUNOLOGIC NEGATIVE: 1
SINUS PRESSURE: 1
CHEST TIGHTNESS: 1
VOICE CHANGE: 1
RHINORRHEA: 1
EYES NEGATIVE: 1
GASTROINTESTINAL NEGATIVE: 1
COUGH: 1

## 2024-04-09 ASSESSMENT — PATIENT HEALTH QUESTIONNAIRE - PHQ9
SUM OF ALL RESPONSES TO PHQ9 QUESTIONS 1 & 2: 0
2. FEELING DOWN, DEPRESSED OR HOPELESS: NOT AT ALL
SUM OF ALL RESPONSES TO PHQ QUESTIONS 1-9: 0
1. LITTLE INTEREST OR PLEASURE IN DOING THINGS: NOT AT ALL
SUM OF ALL RESPONSES TO PHQ QUESTIONS 1-9: 0

## 2024-04-09 NOTE — PATIENT INSTRUCTIONS
SURVEY:     You may be receiving a survey from [facility] [employer] regarding your visit today.     Please complete the survey to enable us to provide the highest quality of care to you and your family.     If you cannot score us a very good on any question, please call the office to discuss how we could have made your experience a very good one.     Thank you,    John Graves, APRN-CNP  Jana Duff, APRN-CNP  Kimberly, LPN  Richelle, CMA  Hank, CMA  Darlin, CMA  Delfina, PCA  Lina, CMA  Makenna, PM

## 2024-04-09 NOTE — PROGRESS NOTES
Pupils: Pupils are equal, round, and reactive to light.   Cardiovascular:      Rate and Rhythm: Normal rate.      Heart sounds: Normal heart sounds. No murmur heard.  Pulmonary:      Effort: Pulmonary effort is normal.      Breath sounds: Normal breath sounds.      Comments: Harsh cough with deep inspiration    Musculoskeletal:         General: Normal range of motion.      Cervical back: Normal range of motion and neck supple.   Lymphadenopathy:      Cervical: Cervical adenopathy (anterior) present.   Skin:     General: Skin is warm.      Capillary Refill: Capillary refill takes less than 2 seconds.   Neurological:      General: No focal deficit present.      Mental Status: He is alert and oriented to person, place, and time.   Psychiatric:         Mood and Affect: Mood normal.         Behavior: Behavior normal.         Thought Content: Thought content normal.         Judgment: Judgment normal.         Data:     Lab Results   Component Value Date/Time     02/01/2024 08:37 AM    K 3.7 02/01/2024 08:37 AM     02/01/2024 08:37 AM    CO2 25 02/01/2024 08:37 AM    BUN 24 02/01/2024 08:37 AM    CREATININE 1.5 02/01/2024 08:37 AM    GLUCOSE 73 02/01/2024 08:37 AM    PROT 7.3 09/27/2023 03:15 AM    LABALBU 4.1 09/27/2023 03:15 AM    BILITOT 1.0 09/27/2023 03:15 AM    ALKPHOS 101 09/27/2023 03:15 AM    AST 15 09/27/2023 03:15 AM    ALT 16 09/27/2023 03:15 AM     Lab Results   Component Value Date/Time    WBC 8.6 02/01/2024 08:37 AM    RBC 6.10 02/01/2024 08:37 AM    HGB 18.7 02/01/2024 08:37 AM    HCT 54.5 02/01/2024 08:37 AM    MCV 89.3 02/01/2024 08:37 AM    MCH 30.7 02/01/2024 08:37 AM    MCHC 34.3 02/01/2024 08:37 AM    RDW 13.1 02/01/2024 08:37 AM     02/01/2024 08:37 AM    MPV 9.8 02/01/2024 08:37 AM     No results found for: \"TSH\"  Lab Results   Component Value Date/Time    CHOL 185 06/27/2023 08:26 AM    HDL 41 06/27/2023 08:26 AM       Assessment/Plan:      Diagnosis Orders   1. Acute

## 2024-04-30 DIAGNOSIS — J20.9 BRONCHITIS WITH BRONCHOSPASM: Primary | ICD-10-CM

## 2024-04-30 RX ORDER — AZITHROMYCIN 250 MG/1
TABLET, FILM COATED ORAL
Qty: 6 TABLET | Refills: 0 | Status: SHIPPED | OUTPATIENT
Start: 2024-04-30 | End: 2024-05-10

## 2024-04-30 RX ORDER — PREDNISONE 20 MG/1
20 TABLET ORAL 2 TIMES DAILY
Qty: 10 TABLET | Refills: 0 | Status: SHIPPED | OUTPATIENT
Start: 2024-04-30 | End: 2024-05-05

## 2024-05-28 ENCOUNTER — HOSPITAL ENCOUNTER (OUTPATIENT)
Age: 64
Setting detail: SPECIMEN
Discharge: HOME | End: 2024-05-28

## 2024-05-28 DIAGNOSIS — R33.8 BENIGN PROSTATIC HYPERPLASIA WITH URINARY RETENTION: ICD-10-CM

## 2024-05-28 DIAGNOSIS — N40.1 BENIGN PROSTATIC HYPERPLASIA WITH URINARY RETENTION: ICD-10-CM

## 2024-05-28 DIAGNOSIS — N31.2 ATONIC BLADDER: ICD-10-CM

## 2024-05-28 LAB
ANION GAP SERPL CALCULATED.3IONS-SCNC: 13 MMOL/L (ref 9–16)
BUN SERPL-MCNC: 23 MG/DL (ref 8–23)
CALCIUM SERPL-MCNC: 9.2 MG/DL (ref 8.6–10.4)
CHLORIDE SERPL-SCNC: 102 MMOL/L (ref 98–107)
CO2 SERPL-SCNC: 24 MMOL/L (ref 20–31)
CREAT SERPL-MCNC: 1.6 MG/DL (ref 0.7–1.2)
GFR, ESTIMATED: 47 ML/MIN/1.73M2
GLUCOSE SERPL-MCNC: 112 MG/DL (ref 74–99)
POTASSIUM SERPL-SCNC: 4 MMOL/L (ref 3.7–5.3)
SODIUM SERPL-SCNC: 139 MMOL/L (ref 136–145)

## 2024-05-30 ENCOUNTER — OFFICE VISIT (OUTPATIENT)
Dept: PRIMARY CARE CLINIC | Age: 64
End: 2024-05-30
Payer: MEDICARE

## 2024-05-30 VITALS
SYSTOLIC BLOOD PRESSURE: 130 MMHG | OXYGEN SATURATION: 94 % | HEART RATE: 80 BPM | DIASTOLIC BLOOD PRESSURE: 86 MMHG | WEIGHT: 259 LBS | TEMPERATURE: 98.3 F | RESPIRATION RATE: 16 BRPM | BODY MASS INDEX: 31.53 KG/M2

## 2024-05-30 DIAGNOSIS — Z13.29 THYROID DISORDER SCREENING: ICD-10-CM

## 2024-05-30 DIAGNOSIS — I10 ESSENTIAL (PRIMARY) HYPERTENSION: Primary | ICD-10-CM

## 2024-05-30 DIAGNOSIS — E78.5 DYSLIPIDEMIA: ICD-10-CM

## 2024-05-30 DIAGNOSIS — J20.9 BRONCHITIS WITH BRONCHOSPASM: ICD-10-CM

## 2024-05-30 DIAGNOSIS — N18.32 STAGE 3B CHRONIC KIDNEY DISEASE (HCC): ICD-10-CM

## 2024-05-30 PROCEDURE — G8417 CALC BMI ABV UP PARAM F/U: HCPCS | Performed by: NURSE PRACTITIONER

## 2024-05-30 PROCEDURE — 1036F TOBACCO NON-USER: CPT | Performed by: NURSE PRACTITIONER

## 2024-05-30 PROCEDURE — 3017F COLORECTAL CA SCREEN DOC REV: CPT | Performed by: NURSE PRACTITIONER

## 2024-05-30 PROCEDURE — 3075F SYST BP GE 130 - 139MM HG: CPT | Performed by: NURSE PRACTITIONER

## 2024-05-30 PROCEDURE — 3079F DIAST BP 80-89 MM HG: CPT | Performed by: NURSE PRACTITIONER

## 2024-05-30 PROCEDURE — 99214 OFFICE O/P EST MOD 30 MIN: CPT | Performed by: NURSE PRACTITIONER

## 2024-05-30 PROCEDURE — 1123F ACP DISCUSS/DSCN MKR DOCD: CPT | Performed by: NURSE PRACTITIONER

## 2024-05-30 PROCEDURE — G8427 DOCREV CUR MEDS BY ELIG CLIN: HCPCS | Performed by: NURSE PRACTITIONER

## 2024-05-30 ASSESSMENT — PATIENT HEALTH QUESTIONNAIRE - PHQ9
2. FEELING DOWN, DEPRESSED OR HOPELESS: NOT AT ALL
1. LITTLE INTEREST OR PLEASURE IN DOING THINGS: NOT AT ALL
SUM OF ALL RESPONSES TO PHQ QUESTIONS 1-9: 0
SUM OF ALL RESPONSES TO PHQ9 QUESTIONS 1 & 2: 0
SUM OF ALL RESPONSES TO PHQ QUESTIONS 1-9: 0

## 2024-05-30 ASSESSMENT — ENCOUNTER SYMPTOMS
GASTROINTESTINAL NEGATIVE: 1
ALLERGIC/IMMUNOLOGIC NEGATIVE: 1
EYES NEGATIVE: 1
WHEEZING: 0
COUGH: 1
SHORTNESS OF BREATH: 0

## 2024-05-30 NOTE — PATIENT INSTRUCTIONS
SURVEY:     You may be receiving a survey from [facility] [employer] regarding your visit today.     Please complete the survey to enable us to provide the highest quality of care to you and your family.     If you cannot score us a very good on any question, please call the office to discuss how we could have made your experience a very good one.     Thank you,    John Graves, APRN-CNP  Jana Duff, APRN-CNP  Kimberly, LPN  Richelle, CMA  Hank, CMA  Darlin, CMA  Delfina, PCA  Lina, CMA  Makenna, PM

## 2024-05-30 NOTE — PROGRESS NOTES
[facility]  CONCHIS CROW CNP  [facility]  [address] [address]  Dept: [phone]  Dept Fax: [phone]      Name: Manuel Rendon  : 1960         Chief Complaint:     Chief Complaint   Patient presents with    Hypertension     6 month check.     Discuss Labs     Patient would like to discuss thyroid lab results.        History of Present Illness:      Manuel Rendon is a 64 y.o.  male who presents with Hypertension (6 month check. ) and Discuss Labs (Patient would like to discuss thyroid lab results. )      GUERLINE Jackson is here today for a routine office visit.  He has been feeling better but continues to have a dry cough.  He states he feels a tickle in his throat and will keep coughing.  He states that it is a coughing jag that lasts 30-40 seconds.  He has used cough suppressants that do not help.  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  He states they are random coughing fits.  No wheezing or shortness of breath.    Went back to his previous dose of Norvasc 10 mg due to having less stress in his life and feeling like his blood pressure would be down.  He did this a couple weeks ago.    His Mother and Brother have thyroid issues.  He is requesting thyroid labs be drawn due to his PTH has remaining elevated.  He states he has been doing research on this and it could be his thyroid.    Past Medical History:     Past Medical History:   Diagnosis Date    NICOLE (acute kidney injury) (HCC)     Chronic kidney disease     COVID-19 2021    Family history of thyroid disease     History of pneumonia     Hx of blood clots     BLE DVTs    Hypertension     Pulmonary embolism (HCC)     \"Approximately 8 years ago.\"     Stage 3b chronic kidney disease (HCC) 2021    Wellness examination     pcp=[facility]had Patino[facility] office-last visit 2021    Wellness examination     hematology-Dr Tobin-last visit 2021    Wellness examination

## 2024-07-12 ENCOUNTER — HOSPITAL ENCOUNTER (OUTPATIENT)
Age: 64
Setting detail: SPECIMEN
Discharge: HOME | End: 2024-07-12

## 2024-07-12 DIAGNOSIS — Z13.29 THYROID DISORDER SCREENING: ICD-10-CM

## 2024-07-12 DIAGNOSIS — E78.5 DYSLIPIDEMIA: ICD-10-CM

## 2024-07-12 LAB
CHOLEST SERPL-MCNC: 239 MG/DL (ref 0–199)
CHOLESTEROL/HDL RATIO: 6
HDLC SERPL-MCNC: 43 MG/DL
LDLC SERPL CALC-MCNC: 164 MG/DL (ref 0–100)
T3FREE SERPL-MCNC: 3.5 PG/ML (ref 2–4.4)
TRIGL SERPL-MCNC: 158 MG/DL
TSH SERPL DL<=0.05 MIU/L-ACNC: 2.29 UIU/ML (ref 0.27–4.2)
VLDLC SERPL CALC-MCNC: 32 MG/DL

## 2024-07-15 ENCOUNTER — TELEPHONE (OUTPATIENT)
Dept: PRIMARY CARE CLINIC | Age: 64
End: 2024-07-15

## 2024-07-15 DIAGNOSIS — E78.2 MIXED HYPERLIPIDEMIA: Primary | ICD-10-CM

## 2024-07-15 RX ORDER — ROSUVASTATIN CALCIUM 10 MG/1
10 TABLET, COATED ORAL NIGHTLY
Qty: 90 TABLET | Refills: 1 | Status: SHIPPED | OUTPATIENT
Start: 2024-07-15 | End: 2024-10-13

## 2024-07-15 NOTE — PROGRESS NOTES
The 10-year ASCVD risk score (Uri ANDERSEN, et al., 2019) is: 19.7%    Values used to calculate the score:      Age: 64 years      Sex: Male      Is Non- : No      Diabetic: No      Tobacco smoker: No      Systolic Blood Pressure: 130 mmHg      Is BP treated: Yes      HDL Cholesterol: 43 mg/dL      Total Cholesterol: 239 mg/dL    Creatine Clearance 64 ml/min no dose adjustments needed.

## 2024-07-15 NOTE — TELEPHONE ENCOUNTER
----- Message from SHARON Ryan - CNP sent at 7/15/2024 12:42 PM EDT -----  Please notify patient of  lab results.  Thyroid labs are all normal.  His Cholesterol and Lipids are elevated.  It is recommended that he start a statin medication.  I am sending a low dose medication to the pharmacy for him to start.  I also recommend lifestyle modifications.  Thanks Jana

## 2024-07-17 ENCOUNTER — TELEPHONE (OUTPATIENT)
Dept: SURGERY | Age: 64
End: 2024-07-17

## 2024-07-17 ENCOUNTER — PATIENT MESSAGE (OUTPATIENT)
Dept: PRIMARY CARE CLINIC | Age: 64
End: 2024-07-17

## 2024-07-17 DIAGNOSIS — I10 ESSENTIAL (PRIMARY) HYPERTENSION: ICD-10-CM

## 2024-07-17 RX ORDER — AMLODIPINE BESYLATE 10 MG/1
10 TABLET ORAL DAILY
Qty: 90 TABLET | Refills: 1 | Status: SHIPPED | OUTPATIENT
Start: 2024-07-17

## 2024-07-17 NOTE — TELEPHONE ENCOUNTER
Health Maintenance   Topic Date Due    Diabetes screen  Never done    Respiratory Syncytial Virus (RSV) Pregnant or age 60 yrs+ (1 - 1-dose 60+ series) Never done    Pneumococcal 65+ years Vaccine (1 of 1 - PCV) Never done    COVID-19 Vaccine (3 - 2023-24 season) 09/01/2023    Colorectal Cancer Screen  05/01/2024    Shingles vaccine (1 of 2) 05/30/2025 (Originally 2/12/2008)    Flu vaccine (1) 08/01/2024    Annual Wellness Visit (Medicare)  11/30/2024    GFR test (Diabetes, CKD 3-4, OR last GFR 15-59)  05/28/2025    Depression Screen  05/30/2025    Lipids  07/12/2025    DTaP/Tdap/Td vaccine (2 - Td or Tdap) 04/28/2031    Hepatitis C screen  Completed    Hepatitis A vaccine  Aged Out    Hepatitis B vaccine  Aged Out    Hib vaccine  Aged Out    Polio vaccine  Aged Out    Meningococcal (ACWY) vaccine  Aged Out             (applicable per patient's age: Cancer Screenings, Depression Screening, Fall Risk Screening, Immunizations)    AST (U/L)   Date Value   09/27/2023 15     ALT (U/L)   Date Value   09/27/2023 16     BUN (mg/dL)   Date Value   05/28/2024 23      (goal A1C is < 7)   (goal LDL is <100) need 30-50% reduction from baseline     BP Readings from Last 3 Encounters:   05/30/24 130/86   04/09/24 (!) 146/92   01/08/24 (!) 159/97    (goal /80)      All Future Testing planned in [facility]:  Lab Frequency Next Occurrence       Next Visit Date:  Future Appointments   Date Time Provider Department Center   12/2/2024  9:00 AM Jana Duff APRN - CNP [facility]            Patient Active Problem List:     Bladder obstruction     Acute kidney injury (HCC)     Leg edema, right     Essential hypertension     Normocytic anemia     Stage 3b chronic kidney disease (HCC)

## 2024-07-17 NOTE — TELEPHONE ENCOUNTER
From: Manuel Rendon  To: Jana Duff  Sent: 7/17/2024 10:44 AM EDT  Subject: Prescripton Refill    Jana,     I need a new prescription for the amplodine (sp) for my blood pressure as I am out of refills.     Also, I still have a persistent dry cough brought on by an occasional irritation in my throat. Just bringing you up to date.     Antonio

## 2024-07-17 NOTE — TELEPHONE ENCOUNTER
Left message for patient to return call regarding scheduling an appointment to update medical history and discuss scheduling surgery

## 2024-07-17 NOTE — TELEPHONE ENCOUNTER
----- Message from Manuel Rendon sent at 7/17/2024 10:46 AM EDT -----  Regarding: Surgery  Contact: [phone]  Dr. Ruelas,     You evaluated me for hernia surgery. I am prepared to schedule that with you. Please have your office contact me to provide available dates.     Antonio Rendon  [phone]

## 2024-08-19 ENCOUNTER — TELEPHONE (OUTPATIENT)
Dept: SURGERY | Age: 64
End: 2024-08-19

## 2024-08-19 ENCOUNTER — OFFICE VISIT (OUTPATIENT)
Dept: SURGERY | Age: 64
End: 2024-08-19
Payer: MEDICARE

## 2024-08-19 VITALS
BODY MASS INDEX: 32.14 KG/M2 | SYSTOLIC BLOOD PRESSURE: 146 MMHG | DIASTOLIC BLOOD PRESSURE: 83 MMHG | WEIGHT: 264 LBS | HEART RATE: 86 BPM

## 2024-08-19 DIAGNOSIS — N18.32 STAGE 3B CHRONIC KIDNEY DISEASE (HCC): ICD-10-CM

## 2024-08-19 DIAGNOSIS — N32.0 BLADDER OBSTRUCTION: ICD-10-CM

## 2024-08-19 DIAGNOSIS — K43.2 VENTRAL INCISIONAL HERNIA: Primary | ICD-10-CM

## 2024-08-19 DIAGNOSIS — Z01.818 PRE-OP TESTING: Primary | ICD-10-CM

## 2024-08-19 PROCEDURE — 1123F ACP DISCUSS/DSCN MKR DOCD: CPT | Performed by: SURGERY

## 2024-08-19 PROCEDURE — 99213 OFFICE O/P EST LOW 20 MIN: CPT | Performed by: SURGERY

## 2024-08-19 PROCEDURE — 3079F DIAST BP 80-89 MM HG: CPT | Performed by: SURGERY

## 2024-08-19 PROCEDURE — G8427 DOCREV CUR MEDS BY ELIG CLIN: HCPCS | Performed by: SURGERY

## 2024-08-19 PROCEDURE — 3077F SYST BP >= 140 MM HG: CPT | Performed by: SURGERY

## 2024-08-19 PROCEDURE — 3017F COLORECTAL CA SCREEN DOC REV: CPT | Performed by: SURGERY

## 2024-08-19 PROCEDURE — 1036F TOBACCO NON-USER: CPT | Performed by: SURGERY

## 2024-08-19 PROCEDURE — G8417 CALC BMI ABV UP PARAM F/U: HCPCS | Performed by: SURGERY

## 2024-08-19 NOTE — TELEPHONE ENCOUNTER
Manuel Rendon     1960        male    [address]  [place] [address]                    Legal Guardian No  If yes, Name:       Skilled Facility No     If yes, Name:                                             Home Phone: [phone]         Cell Phone:    Telephone Information:   Mobile [phone]                                           Surgeon: Suraj Surgery Date: 09/17/24                       Procedure: Robotic Ventral incisional hernia repair  Duration:    Diagnosis: Ventral incisional hernia   CPT Codes:19178    Important Medical History:  In Epic    First Assistant   Special Inst/Equip/Implants: Regular    Nickel allergy  No  Latex Allergy: No      Cardiac Device:  No  If yes, need most recent pacemaker interrogation from Cardiologist:  Type of pacemaker:    Anesthesia:    General                       Admission Type:  Same Day                        Admit Prior to Day of Surgery: No    Case Location:  Ambulatory            Preadmission Testing:  Phone Call             PAT Date and Time:     Need Preop Cardiac Clearance: *  Need Pre-op/Medical Clearance:    Does Patient have Cardiologist/physician? Name of Physician:        Special Needs Communication:  Ernie Lift No    needed No

## 2024-08-19 NOTE — PROGRESS NOTES
Name:  Manuel Rendon  Age:  64 y.o.   :  1960    Physician: NICOLASA KITCHEN MD       Chief Complaint: Ventral Incisional Hernia      HPI:  Patient saw me several months ago, January, with a ventral incisional hernia through a previous umbilical incision.  He is now ready to have it repair.  He has infrequent sharp pain.  Otherwise it does not bother him of impact his day to day acitivities.        MEDICAL HISTORY:    Past Medical History:        Diagnosis Date    NICOLE (acute kidney injury) (HCC)     Chronic kidney disease     COVID-19 2021    Family history of thyroid disease     History of pneumonia     Hx of blood clots     BLE DVTs    Hypertension     Pulmonary embolism (HCC)     \"Approximately 8 years ago.\"     Stage 3b chronic kidney disease (HCC) 2021    Wellness examination     pcp=[facility] office-last visit 2021    Wellness examination     hematology-Dr Tobin-last visit 2021    Wellness examination     nephrology-Dr Villafana-last visit 2021       Past Surgical History:        Procedure Laterality Date    CHOLECYSTECTOMY      CYSTOGRAPHY N/A 2021    URODYNAMICS performed by Misael Brito Jr., MD at [facility] OR    CYSTOSCOPY      with videourodynamics    CYSTOSCOPY N/A 2021    CYSTOSCOPY performed by Misael Brito Jr., MD at [facility] OR    KNEE SURGERY      When pt was early 20's.    TONSILLECTOMY      As a child.       Prior to Admission medications    Medication Sig Start Date End Date Taking? Authorizing Provider   amLODIPine (NORVASC) 10 MG tablet Take 1 tablet by mouth daily 24  Yes Jana Duff APRN - CNP   rosuvastatin (CRESTOR) 10 MG tablet Take 1 tablet by mouth nightly 7/15/24 10/13/24 Yes Jana Duff APRN - CNP   calcitRIOL (ROCALTROL) 0.25 MCG capsule TAKE 1 CAPSULE BY MOUTH 5 TIMES A WEEK ON MONDAY, TUESDAY, WEDNESDAY, THURSDAY, FRIDAY 10/20/23  Yes Chuy Villafana MD   aspirin EC 81 MG EC tablet Take 1 tablet by mouth

## 2024-08-19 NOTE — PATIENT INSTRUCTIONS
SURVEY:    You may be receiving a survey from [facility] regarding your visit today.    You may get this in the mail, through your MyChart, or in your email.     Please complete the survey to enable us to provide the highest quality of care to you and your family.    If you cannot score us a very good (5 Stars) on any question, please call the office to discuss how we could of made your experience exceptional.    Thank you!    MD Dr. Amber Goodman MD Wendy Williams, SHARON-CLARIBEL Frost LPN Brenda Boehler, LPN Jena Adams, MA    Phone: [phone]  Fax: [phone]    Office Hours:   M-TH 8-5, F: 8-12

## 2024-09-03 ENCOUNTER — HOSPITAL ENCOUNTER (OUTPATIENT)
Age: 64
Discharge: HOME | End: 2024-09-03
Payer: MEDICARE

## 2024-09-03 DIAGNOSIS — N32.0 BLADDER OBSTRUCTION: ICD-10-CM

## 2024-09-03 DIAGNOSIS — K43.2 VENTRAL INCISIONAL HERNIA: ICD-10-CM

## 2024-09-03 DIAGNOSIS — N18.32 STAGE 3B CHRONIC KIDNEY DISEASE (HCC): ICD-10-CM

## 2024-09-03 DIAGNOSIS — Z01.818 PRE-OP TESTING: ICD-10-CM

## 2024-09-03 LAB
ANION GAP SERPL CALCULATED.3IONS-SCNC: 10 MMOL/L (ref 9–16)
BASOPHILS # BLD: 0.05 K/UL (ref 0–0.2)
BASOPHILS NFR BLD: 1 % (ref 0–2)
BUN SERPL-MCNC: 21 MG/DL (ref 8–23)
BUN/CREAT SERPL: 12 (ref 9–20)
CALCIUM SERPL-MCNC: 9.4 MG/DL (ref 8.6–10.4)
CHLORIDE SERPL-SCNC: 104 MMOL/L (ref 98–107)
CO2 SERPL-SCNC: 24 MMOL/L (ref 20–31)
CREAT SERPL-MCNC: 1.8 MG/DL (ref 0.7–1.2)
EKG ATRIAL RATE: 58 BPM
EKG P AXIS: 46 DEGREES
EKG P-R INTERVAL: 178 MS
EKG Q-T INTERVAL: 446 MS
EKG QRS DURATION: 98 MS
EKG QTC CALCULATION (BAZETT): 437 MS
EKG R AXIS: -59 DEGREES
EKG T AXIS: 74 DEGREES
EKG VENTRICULAR RATE: 58 BPM
EOSINOPHIL # BLD: 0.32 K/UL (ref 0–0.44)
EOSINOPHILS RELATIVE PERCENT: 4 % (ref 1–4)
ERYTHROCYTE [DISTWIDTH] IN BLOOD BY AUTOMATED COUNT: 12.9 % (ref 11.8–14.4)
GFR, ESTIMATED: 41 ML/MIN/1.73M2
GLUCOSE SERPL-MCNC: 112 MG/DL (ref 74–99)
HCT VFR BLD AUTO: 50.9 % (ref 40.7–50.3)
HGB BLD-MCNC: 17.6 G/DL (ref 13–17)
IMM GRANULOCYTES # BLD AUTO: <0.03 K/UL (ref 0–0.3)
IMM GRANULOCYTES NFR BLD: 0 %
LYMPHOCYTES NFR BLD: 2.45 K/UL (ref 1.1–3.7)
LYMPHOCYTES RELATIVE PERCENT: 31 % (ref 24–43)
MCH RBC QN AUTO: 30.8 PG (ref 25.2–33.5)
MCHC RBC AUTO-ENTMCNC: 34.6 G/DL (ref 28.4–34.8)
MCV RBC AUTO: 89 FL (ref 82.6–102.9)
MONOCYTES NFR BLD: 0.69 K/UL (ref 0.1–1.2)
MONOCYTES NFR BLD: 9 % (ref 3–12)
NEUTROPHILS NFR BLD: 56 % (ref 36–65)
NEUTS SEG NFR BLD: 4.5 K/UL (ref 1.5–8.1)
NRBC BLD-RTO: 0 PER 100 WBC
PLATELET # BLD AUTO: 295 K/UL (ref 138–453)
PMV BLD AUTO: 9.5 FL (ref 8.1–13.5)
POTASSIUM SERPL-SCNC: 3.7 MMOL/L (ref 3.7–5.3)
RBC # BLD AUTO: 5.72 M/UL (ref 4.21–5.77)
SODIUM SERPL-SCNC: 138 MMOL/L (ref 136–145)
WBC OTHER # BLD: 8 K/UL (ref 3.5–11.3)

## 2024-09-03 PROCEDURE — 93010 ELECTROCARDIOGRAM REPORT: CPT | Performed by: FAMILY MEDICINE

## 2024-09-03 PROCEDURE — 93005 ELECTROCARDIOGRAM TRACING: CPT

## 2024-09-03 PROCEDURE — 80048 BASIC METABOLIC PNL TOTAL CA: CPT

## 2024-09-03 PROCEDURE — 36415 COLL VENOUS BLD VENIPUNCTURE: CPT

## 2024-09-03 PROCEDURE — 85025 COMPLETE CBC W/AUTO DIFF WBC: CPT

## 2024-09-05 ENCOUNTER — TELEPHONE (OUTPATIENT)
Dept: PRIMARY CARE CLINIC | Age: 64
End: 2024-09-05

## 2024-09-05 NOTE — TELEPHONE ENCOUNTER
----- Message from SHARON Ye CNP sent at 9/5/2024 12:41 PM EDT -----  Please notify patient of elevated creatine and decreased GFR results.  I would like him to follow up with Nephrology. Thanks Jana

## 2024-09-16 ENCOUNTER — ANESTHESIA EVENT (OUTPATIENT)
Dept: OPERATING ROOM | Age: 64
End: 2024-09-16
Payer: MEDICARE

## 2024-09-17 ENCOUNTER — ANESTHESIA (OUTPATIENT)
Dept: OPERATING ROOM | Age: 64
End: 2024-09-17
Payer: MEDICARE

## 2024-09-17 ENCOUNTER — HOSPITAL ENCOUNTER (OUTPATIENT)
Age: 64
Setting detail: OUTPATIENT SURGERY
Discharge: HOME | End: 2024-09-17
Attending: SURGERY | Admitting: SURGERY
Payer: MEDICARE

## 2024-09-17 VITALS
HEART RATE: 77 BPM | SYSTOLIC BLOOD PRESSURE: 152 MMHG | WEIGHT: 244.8 LBS | BODY MASS INDEX: 29.81 KG/M2 | DIASTOLIC BLOOD PRESSURE: 94 MMHG | TEMPERATURE: 97.4 F | RESPIRATION RATE: 16 BRPM | HEIGHT: 76 IN | OXYGEN SATURATION: 97 %

## 2024-09-17 DIAGNOSIS — K43.9 VENTRAL HERNIA WITHOUT OBSTRUCTION OR GANGRENE: ICD-10-CM

## 2024-09-17 PROCEDURE — 2580000003 HC RX 258: Performed by: NURSE ANESTHETIST, CERTIFIED REGISTERED

## 2024-09-17 PROCEDURE — 3700000000 HC ANESTHESIA ATTENDED CARE: Performed by: SURGERY

## 2024-09-17 PROCEDURE — 7100000011 HC PHASE II RECOVERY - ADDTL 15 MIN: Performed by: SURGERY

## 2024-09-17 PROCEDURE — C1781 MESH (IMPLANTABLE): HCPCS | Performed by: SURGERY

## 2024-09-17 PROCEDURE — 64488 TAP BLOCK BI INJECTION: CPT | Performed by: NURSE ANESTHETIST, CERTIFIED REGISTERED

## 2024-09-17 PROCEDURE — 7100000001 HC PACU RECOVERY - ADDTL 15 MIN: Performed by: SURGERY

## 2024-09-17 PROCEDURE — S2900 ROBOTIC SURGICAL SYSTEM: HCPCS | Performed by: SURGERY

## 2024-09-17 PROCEDURE — 3700000001 HC ADD 15 MINUTES (ANESTHESIA): Performed by: SURGERY

## 2024-09-17 PROCEDURE — 2500000003 HC RX 250 WO HCPCS: Performed by: NURSE ANESTHETIST, CERTIFIED REGISTERED

## 2024-09-17 PROCEDURE — 6370000000 HC RX 637 (ALT 250 FOR IP): Performed by: NURSE ANESTHETIST, CERTIFIED REGISTERED

## 2024-09-17 PROCEDURE — 6360000002 HC RX W HCPCS: Performed by: NURSE ANESTHETIST, CERTIFIED REGISTERED

## 2024-09-17 PROCEDURE — 3600000019 HC SURGERY ROBOT ADDTL 15MIN: Performed by: SURGERY

## 2024-09-17 PROCEDURE — 2709999900 HC NON-CHARGEABLE SUPPLY: Performed by: SURGERY

## 2024-09-17 PROCEDURE — 88302 TISSUE EXAM BY PATHOLOGIST: CPT

## 2024-09-17 PROCEDURE — 7100000010 HC PHASE II RECOVERY - FIRST 15 MIN: Performed by: SURGERY

## 2024-09-17 PROCEDURE — 6360000002 HC RX W HCPCS: Performed by: SURGERY

## 2024-09-17 PROCEDURE — 7100000000 HC PACU RECOVERY - FIRST 15 MIN: Performed by: SURGERY

## 2024-09-17 PROCEDURE — 49591 RPR AA HRN 1ST < 3 CM RDC: CPT | Performed by: SURGERY

## 2024-09-17 PROCEDURE — 3600000009 HC SURGERY ROBOT BASE: Performed by: SURGERY

## 2024-09-17 DEVICE — PATCH HERN L DIA3.2IN CIR W/ STRP SEPRA TECHNOLOGY ABSRB: Type: IMPLANTABLE DEVICE | Site: ABDOMEN | Status: FUNCTIONAL

## 2024-09-17 RX ORDER — SODIUM CHLORIDE 9 MG/ML
INJECTION, SOLUTION INTRAMUSCULAR; INTRAVENOUS; SUBCUTANEOUS
Status: DISCONTINUED | OUTPATIENT
Start: 2024-09-17 | End: 2024-09-17 | Stop reason: SDUPTHER

## 2024-09-17 RX ORDER — SODIUM CHLORIDE 9 MG/ML
INJECTION, SOLUTION INTRAVENOUS PRN
Status: DISCONTINUED | OUTPATIENT
Start: 2024-09-17 | End: 2024-09-17 | Stop reason: HOSPADM

## 2024-09-17 RX ORDER — DEXAMETHASONE SODIUM PHOSPHATE 4 MG/ML
INJECTION, SOLUTION INTRA-ARTICULAR; INTRALESIONAL; INTRAMUSCULAR; INTRAVENOUS; SOFT TISSUE
Status: DISCONTINUED | OUTPATIENT
Start: 2024-09-17 | End: 2024-09-17 | Stop reason: SDUPTHER

## 2024-09-17 RX ORDER — ROPIVACAINE HYDROCHLORIDE 5 MG/ML
INJECTION, SOLUTION EPIDURAL; INFILTRATION; PERINEURAL
Status: DISCONTINUED | OUTPATIENT
Start: 2024-09-17 | End: 2024-09-17 | Stop reason: SDUPTHER

## 2024-09-17 RX ORDER — ROCURONIUM BROMIDE 10 MG/ML
INJECTION, SOLUTION INTRAVENOUS
Status: DISCONTINUED | OUTPATIENT
Start: 2024-09-17 | End: 2024-09-17 | Stop reason: SDUPTHER

## 2024-09-17 RX ORDER — GABAPENTIN 400 MG/1
400 CAPSULE ORAL ONCE
Status: COMPLETED | OUTPATIENT
Start: 2024-09-17 | End: 2024-09-17

## 2024-09-17 RX ORDER — FENTANYL CITRATE 50 UG/ML
INJECTION, SOLUTION INTRAMUSCULAR; INTRAVENOUS
Status: DISCONTINUED | OUTPATIENT
Start: 2024-09-17 | End: 2024-09-17 | Stop reason: SDUPTHER

## 2024-09-17 RX ORDER — CEFAZOLIN SODIUM IN 0.9 % NACL 2 G/100 ML
2000 PLASTIC BAG, INJECTION (ML) INTRAVENOUS
Status: DISCONTINUED | OUTPATIENT
Start: 2024-09-17 | End: 2024-09-17 | Stop reason: HOSPADM

## 2024-09-17 RX ORDER — PROPOFOL 10 MG/ML
INJECTION, EMULSION INTRAVENOUS
Status: DISCONTINUED | OUTPATIENT
Start: 2024-09-17 | End: 2024-09-17 | Stop reason: SDUPTHER

## 2024-09-17 RX ORDER — SODIUM CHLORIDE, SODIUM LACTATE, POTASSIUM CHLORIDE, CALCIUM CHLORIDE 600; 310; 30; 20 MG/100ML; MG/100ML; MG/100ML; MG/100ML
INJECTION, SOLUTION INTRAVENOUS CONTINUOUS
Status: DISCONTINUED | OUTPATIENT
Start: 2024-09-17 | End: 2024-09-17 | Stop reason: HOSPADM

## 2024-09-17 RX ORDER — LIDOCAINE HYDROCHLORIDE 20 MG/ML
INJECTION, SOLUTION EPIDURAL; INFILTRATION; INTRACAUDAL; PERINEURAL
Status: DISCONTINUED | OUTPATIENT
Start: 2024-09-17 | End: 2024-09-17 | Stop reason: SDUPTHER

## 2024-09-17 RX ORDER — HYDROCODONE BITARTRATE AND ACETAMINOPHEN 5; 325 MG/1; MG/1
1-2 TABLET ORAL EVERY 6 HOURS PRN
Qty: 30 TABLET | Refills: 0 | Status: SHIPPED | OUTPATIENT
Start: 2024-09-17 | End: 2024-09-24

## 2024-09-17 RX ORDER — EPHEDRINE SULFATE/0.9% NACL/PF 25 MG/5 ML
SYRINGE (ML) INTRAVENOUS
Status: DISCONTINUED | OUTPATIENT
Start: 2024-09-17 | End: 2024-09-17 | Stop reason: SDUPTHER

## 2024-09-17 RX ORDER — HYDRALAZINE HYDROCHLORIDE 20 MG/ML
10 INJECTION INTRAMUSCULAR; INTRAVENOUS
Status: DISCONTINUED | OUTPATIENT
Start: 2024-09-17 | End: 2024-09-17 | Stop reason: HOSPADM

## 2024-09-17 RX ORDER — FENTANYL CITRATE 50 UG/ML
50 INJECTION, SOLUTION INTRAMUSCULAR; INTRAVENOUS EVERY 5 MIN PRN
Status: DISCONTINUED | OUTPATIENT
Start: 2024-09-17 | End: 2024-09-17 | Stop reason: HOSPADM

## 2024-09-17 RX ORDER — SODIUM CHLORIDE 0.9 % (FLUSH) 0.9 %
5-40 SYRINGE (ML) INJECTION EVERY 12 HOURS SCHEDULED
Status: DISCONTINUED | OUTPATIENT
Start: 2024-09-17 | End: 2024-09-17 | Stop reason: HOSPADM

## 2024-09-17 RX ORDER — SODIUM CHLORIDE 0.9 % (FLUSH) 0.9 %
5-40 SYRINGE (ML) INJECTION PRN
Status: DISCONTINUED | OUTPATIENT
Start: 2024-09-17 | End: 2024-09-17 | Stop reason: HOSPADM

## 2024-09-17 RX ORDER — DEXAMETHASONE SODIUM PHOSPHATE 10 MG/ML
INJECTION, SOLUTION INTRAMUSCULAR; INTRAVENOUS
Status: DISCONTINUED | OUTPATIENT
Start: 2024-09-17 | End: 2024-09-17 | Stop reason: SDUPTHER

## 2024-09-17 RX ORDER — HYDROCODONE BITARTRATE AND ACETAMINOPHEN 5; 325 MG/1; MG/1
1 TABLET ORAL EVERY 6 HOURS PRN
Status: DISCONTINUED | OUTPATIENT
Start: 2024-09-17 | End: 2024-09-17 | Stop reason: HOSPADM

## 2024-09-17 RX ORDER — ONDANSETRON 2 MG/ML
INJECTION INTRAMUSCULAR; INTRAVENOUS
Status: DISCONTINUED | OUTPATIENT
Start: 2024-09-17 | End: 2024-09-17 | Stop reason: SDUPTHER

## 2024-09-17 RX ORDER — ACETAMINOPHEN 325 MG/1
650 TABLET ORAL ONCE
Status: COMPLETED | OUTPATIENT
Start: 2024-09-17 | End: 2024-09-17

## 2024-09-17 RX ORDER — HYDRALAZINE HYDROCHLORIDE 20 MG/ML
10 INJECTION INTRAMUSCULAR; INTRAVENOUS
Status: COMPLETED | OUTPATIENT
Start: 2024-09-17 | End: 2024-09-17

## 2024-09-17 RX ORDER — DIMENHYDRINATE 50 MG
50 TABLET ORAL ONCE
Status: COMPLETED | OUTPATIENT
Start: 2024-09-17 | End: 2024-09-17

## 2024-09-17 RX ADMIN — HYDROCODONE BITARTRATE AND ACETAMINOPHEN 1 TABLET: 5; 325 TABLET ORAL at 11:28

## 2024-09-17 RX ADMIN — SUGAMMADEX 200 MG: 100 INJECTION, SOLUTION INTRAVENOUS at 09:33

## 2024-09-17 RX ADMIN — ACETAMINOPHEN 650 MG: 325 TABLET ORAL at 06:57

## 2024-09-17 RX ADMIN — FENTANYL CITRATE 50 MCG: 50 INJECTION INTRAMUSCULAR; INTRAVENOUS at 08:11

## 2024-09-17 RX ADMIN — HYDRALAZINE HYDROCHLORIDE 10 MG: 20 INJECTION, SOLUTION INTRAMUSCULAR; INTRAVENOUS at 10:31

## 2024-09-17 RX ADMIN — Medication 2000 MG: at 08:02

## 2024-09-17 RX ADMIN — ROCURONIUM BROMIDE 50 MG: 10 INJECTION, SOLUTION INTRAVENOUS at 08:11

## 2024-09-17 RX ADMIN — HYDRALAZINE HYDROCHLORIDE 10 MG: 20 INJECTION, SOLUTION INTRAMUSCULAR; INTRAVENOUS at 10:09

## 2024-09-17 RX ADMIN — SODIUM CHLORIDE, POTASSIUM CHLORIDE, SODIUM LACTATE AND CALCIUM CHLORIDE: 600; 310; 30; 20 INJECTION, SOLUTION INTRAVENOUS at 06:43

## 2024-09-17 RX ADMIN — EPHEDRINE SULFATE 10 MG: 5 INJECTION INTRAVENOUS at 08:33

## 2024-09-17 RX ADMIN — LIDOCAINE HYDROCHLORIDE 100 MG: 20 INJECTION, SOLUTION EPIDURAL; INFILTRATION; INTRACAUDAL; PERINEURAL at 08:11

## 2024-09-17 RX ADMIN — ROPIVACAINE HYDROCHLORIDE 60 ML: 5 INJECTION EPIDURAL; INFILTRATION; PERINEURAL at 08:22

## 2024-09-17 RX ADMIN — DEXAMETHASONE SODIUM PHOSPHATE 4 MG: 4 INJECTION, SOLUTION INTRAMUSCULAR; INTRAVENOUS at 09:27

## 2024-09-17 RX ADMIN — ROCURONIUM BROMIDE 20 MG: 10 INJECTION, SOLUTION INTRAVENOUS at 08:37

## 2024-09-17 RX ADMIN — PROPOFOL 200 MG: 10 INJECTION, EMULSION INTRAVENOUS at 08:11

## 2024-09-17 RX ADMIN — ROCURONIUM BROMIDE 10 MG: 10 INJECTION, SOLUTION INTRAVENOUS at 08:59

## 2024-09-17 RX ADMIN — DEXAMETHASONE SODIUM PHOSPHATE 10 MG: 10 INJECTION INTRAMUSCULAR; INTRAVENOUS at 08:22

## 2024-09-17 RX ADMIN — ONDANSETRON 4 MG: 2 INJECTION, SOLUTION INTRAMUSCULAR; INTRAVENOUS at 09:27

## 2024-09-17 RX ADMIN — GABAPENTIN 400 MG: 400 CAPSULE ORAL at 06:57

## 2024-09-17 RX ADMIN — SODIUM CHLORIDE 40 ML: 9 INJECTION INTRAMUSCULAR; INTRAVENOUS; SUBCUTANEOUS at 08:22

## 2024-09-17 RX ADMIN — FENTANYL CITRATE 50 MCG: 50 INJECTION INTRAMUSCULAR; INTRAVENOUS at 09:32

## 2024-09-17 RX ADMIN — DIMENHYDRINATE 50 MG: 50 TABLET ORAL at 06:57

## 2024-09-17 ASSESSMENT — PAIN - FUNCTIONAL ASSESSMENT
PAIN_FUNCTIONAL_ASSESSMENT: 0-10
PAIN_FUNCTIONAL_ASSESSMENT: FACE, LEGS, ACTIVITY, CRY, AND CONSOLABILITY (FLACC)
PAIN_FUNCTIONAL_ASSESSMENT: 0-10
PAIN_FUNCTIONAL_ASSESSMENT: 0-10
PAIN_FUNCTIONAL_ASSESSMENT: FACE, LEGS, ACTIVITY, CRY, AND CONSOLABILITY (FLACC)
PAIN_FUNCTIONAL_ASSESSMENT: 0-10
PAIN_FUNCTIONAL_ASSESSMENT: FACE, LEGS, ACTIVITY, CRY, AND CONSOLABILITY (FLACC)
PAIN_FUNCTIONAL_ASSESSMENT: FACE, LEGS, ACTIVITY, CRY, AND CONSOLABILITY (FLACC)
PAIN_FUNCTIONAL_ASSESSMENT: 0-10
PAIN_FUNCTIONAL_ASSESSMENT: FACE, LEGS, ACTIVITY, CRY, AND CONSOLABILITY (FLACC)

## 2024-09-17 ASSESSMENT — PAIN DESCRIPTION - DESCRIPTORS
DESCRIPTORS: PRESSURE;SORE
DESCRIPTORS: PRESSURE
DESCRIPTORS: SORE

## 2024-09-17 ASSESSMENT — PAIN SCALES - GENERAL: PAINLEVEL_OUTOF10: 4

## 2024-09-18 LAB — SURGICAL PATHOLOGY REPORT: NORMAL

## 2024-10-02 ENCOUNTER — HOSPITAL ENCOUNTER (OUTPATIENT)
Age: 64
Setting detail: SPECIMEN
Discharge: HOME | End: 2024-10-02
Payer: MEDICARE

## 2024-10-02 ENCOUNTER — NURSE ONLY (OUTPATIENT)
Dept: PRIMARY CARE CLINIC | Age: 64
End: 2024-10-02
Payer: MEDICARE

## 2024-10-02 VITALS
SYSTOLIC BLOOD PRESSURE: 116 MMHG | DIASTOLIC BLOOD PRESSURE: 80 MMHG | HEART RATE: 96 BPM | TEMPERATURE: 99.2 F | OXYGEN SATURATION: 98 % | BODY MASS INDEX: 30.43 KG/M2 | RESPIRATION RATE: 20 BRPM | WEIGHT: 250 LBS

## 2024-10-02 DIAGNOSIS — N30.01 ACUTE CYSTITIS WITH HEMATURIA: ICD-10-CM

## 2024-10-02 DIAGNOSIS — R50.9 FEVER, UNSPECIFIED FEVER CAUSE: Primary | ICD-10-CM

## 2024-10-02 LAB
BILIRUBIN, POC: ABNORMAL
BLOOD URINE, POC: ABNORMAL
CLARITY, POC: ABNORMAL
COLOR, POC: ABNORMAL
GLUCOSE URINE, POC: ABNORMAL MG/DL
KETONES, POC: ABNORMAL MG/DL
LEUKOCYTE EST, POC: ABNORMAL
NITRITE, POC: POSITIVE
PH, POC: 6.5
PROTEIN, POC: ABNORMAL MG/DL
SPECIFIC GRAVITY, POC: 1.02
UROBILINOGEN, POC: ABNORMAL MG/DL

## 2024-10-02 PROCEDURE — 87184 SC STD DISK METHOD PER PLATE: CPT

## 2024-10-02 PROCEDURE — 87086 URINE CULTURE/COLONY COUNT: CPT

## 2024-10-02 PROCEDURE — 87186 SC STD MICRODIL/AGAR DIL: CPT

## 2024-10-02 PROCEDURE — 87077 CULTURE AEROBIC IDENTIFY: CPT

## 2024-10-02 PROCEDURE — 96372 THER/PROPH/DIAG INJ SC/IM: CPT | Performed by: NURSE PRACTITIONER

## 2024-10-02 PROCEDURE — 81003 URINALYSIS AUTO W/O SCOPE: CPT | Performed by: NURSE PRACTITIONER

## 2024-10-02 RX ORDER — CIPROFLOXACIN 500 MG/1
500 TABLET, FILM COATED ORAL 2 TIMES DAILY
Qty: 14 TABLET | Refills: 0 | Status: SHIPPED | OUTPATIENT
Start: 2024-10-02 | End: 2024-10-09

## 2024-10-02 RX ORDER — CEFTRIAXONE 1 G/1
1000 INJECTION, POWDER, FOR SOLUTION INTRAMUSCULAR; INTRAVENOUS ONCE
Status: COMPLETED | OUTPATIENT
Start: 2024-10-02 | End: 2024-10-02

## 2024-10-02 RX ADMIN — CEFTRIAXONE 1000 MG: 1 INJECTION, POWDER, FOR SOLUTION INTRAMUSCULAR; INTRAVENOUS at 09:41

## 2024-10-02 NOTE — PROGRESS NOTES
After obtaining consent, and per orders of Jana Duff CNP, injection of Rocephin 1 gram given in Right upper quad. gluteus by Richelle Tesfaye CMA. Patient instructed to remain in clinic for 20 minutes afterwards, and to report any adverse reaction to me immediately.

## 2024-10-05 LAB
MICROORGANISM SPEC CULT: ABNORMAL
SERVICE CMNT-IMP: ABNORMAL
SPECIMEN DESCRIPTION: ABNORMAL

## 2024-10-07 ENCOUNTER — TELEPHONE (OUTPATIENT)
Dept: PRIMARY CARE CLINIC | Age: 64
End: 2024-10-07

## 2024-10-07 NOTE — TELEPHONE ENCOUNTER
Patient advised and verbalized understanding.  Patient is feeling much better will call if any issues.

## 2024-10-07 NOTE — TELEPHONE ENCOUNTER
----- Message from SHARON Ye CNP sent at 10/7/2024  8:33 AM EDT -----  Please notify patient of culture results.  The antibiotics are sensitive to the bacteria growing.  Ask him if he is feeling better.  Thanks Jana

## 2024-10-28 ENCOUNTER — OFFICE VISIT (OUTPATIENT)
Dept: SURGERY | Age: 64
End: 2024-10-28
Payer: MEDICARE

## 2024-10-28 VITALS
HEART RATE: 83 BPM | BODY MASS INDEX: 30.92 KG/M2 | WEIGHT: 254 LBS | SYSTOLIC BLOOD PRESSURE: 134 MMHG | DIASTOLIC BLOOD PRESSURE: 92 MMHG

## 2024-10-28 DIAGNOSIS — K43.2 VENTRAL INCISIONAL HERNIA: Primary | ICD-10-CM

## 2024-10-28 PROCEDURE — 3017F COLORECTAL CA SCREEN DOC REV: CPT | Performed by: SURGERY

## 2024-10-28 PROCEDURE — 1126F AMNT PAIN NOTED NONE PRSNT: CPT | Performed by: SURGERY

## 2024-10-28 PROCEDURE — 1036F TOBACCO NON-USER: CPT | Performed by: SURGERY

## 2024-10-28 PROCEDURE — 3080F DIAST BP >= 90 MM HG: CPT | Performed by: SURGERY

## 2024-10-28 PROCEDURE — 3075F SYST BP GE 130 - 139MM HG: CPT | Performed by: SURGERY

## 2024-10-28 PROCEDURE — 1159F MED LIST DOCD IN RCRD: CPT | Performed by: SURGERY

## 2024-10-28 PROCEDURE — G8484 FLU IMMUNIZE NO ADMIN: HCPCS | Performed by: SURGERY

## 2024-10-28 PROCEDURE — G8417 CALC BMI ABV UP PARAM F/U: HCPCS | Performed by: SURGERY

## 2024-10-28 PROCEDURE — 1123F ACP DISCUSS/DSCN MKR DOCD: CPT | Performed by: SURGERY

## 2024-10-28 PROCEDURE — G8427 DOCREV CUR MEDS BY ELIG CLIN: HCPCS | Performed by: SURGERY

## 2024-10-28 PROCEDURE — 99212 OFFICE O/P EST SF 10 MIN: CPT | Performed by: SURGERY

## 2024-10-28 NOTE — PROGRESS NOTES
Patient more than a month out from his ventral hernia repair.  Physically he is doing well.  Minimal discomfort.  Has resumed his usual activities. Appetite is good.    He follow up appointment was delayed, as he wife ended up rapidly deteriorating and dying related to complications of a known cancer.      BP (!) 134/92 (Site: Right Upper Arm, Position: Sitting)   Pulse 83   Wt 115.2 kg (254 lb)   BMI 30.92 kg/m²     He is alert, in no distress.    Abdomen - soft, +BS.  No sign of recurrent hernia.  Incisions well healed.    A. HERNIA SAC, EXCISION:  Fibromembranous tissue, consistent with   hernia sac.     IMP/PLAN  1) Doing well. Follow up PRN

## 2024-10-28 NOTE — PATIENT INSTRUCTIONS
SURVEY:    You may be receiving a survey from [facility] regarding your visit today.    You may get this in the mail, through your MyChart, or in your email.     Please complete the survey to enable us to provide the highest quality of care to you and your family.    If you cannot score us a very good (5 Stars) on any question, please call the office to discuss how we could of made your experience exceptional.    Thank you!    MD Dr. Shereen Goodman, DO  Dr. Anirudh Lind, DO    Dr. Rohan Quintero, DO    MD Arielle Johns, APRN-EVELINE Adkins, EDU Schneider LPN Jena Adams, MA Emily Akers, MA    Phone: [phone]  Fax: [phone]    Office Hours:   M-TH 8-5, F: 8-12

## 2024-11-28 SDOH — HEALTH STABILITY: PHYSICAL HEALTH: ON AVERAGE, HOW MANY MINUTES DO YOU ENGAGE IN EXERCISE AT THIS LEVEL?: 40 MIN

## 2024-11-28 SDOH — HEALTH STABILITY: PHYSICAL HEALTH: ON AVERAGE, HOW MANY DAYS PER WEEK DO YOU ENGAGE IN MODERATE TO STRENUOUS EXERCISE (LIKE A BRISK WALK)?: 6 DAYS

## 2024-11-28 ASSESSMENT — PATIENT HEALTH QUESTIONNAIRE - PHQ9
2. FEELING DOWN, DEPRESSED OR HOPELESS: NOT AT ALL
SUM OF ALL RESPONSES TO PHQ QUESTIONS 1-9: 0
SUM OF ALL RESPONSES TO PHQ QUESTIONS 1-9: 0
SUM OF ALL RESPONSES TO PHQ9 QUESTIONS 1 & 2: 0
SUM OF ALL RESPONSES TO PHQ QUESTIONS 1-9: 0
SUM OF ALL RESPONSES TO PHQ QUESTIONS 1-9: 0
1. LITTLE INTEREST OR PLEASURE IN DOING THINGS: NOT AT ALL

## 2024-11-28 ASSESSMENT — LIFESTYLE VARIABLES
HOW OFTEN DO YOU HAVE A DRINK CONTAINING ALCOHOL: NEVER
HOW MANY STANDARD DRINKS CONTAINING ALCOHOL DO YOU HAVE ON A TYPICAL DAY: 0
HOW MANY STANDARD DRINKS CONTAINING ALCOHOL DO YOU HAVE ON A TYPICAL DAY: PATIENT DOES NOT DRINK
HOW OFTEN DO YOU HAVE SIX OR MORE DRINKS ON ONE OCCASION: 1
HOW OFTEN DO YOU HAVE A DRINK CONTAINING ALCOHOL: 1

## 2024-12-01 SDOH — ECONOMIC STABILITY: INCOME INSECURITY: HOW HARD IS IT FOR YOU TO PAY FOR THE VERY BASICS LIKE FOOD, HOUSING, MEDICAL CARE, AND HEATING?: NOT HARD AT ALL

## 2024-12-01 SDOH — ECONOMIC STABILITY: FOOD INSECURITY: WITHIN THE PAST 12 MONTHS, THE FOOD YOU BOUGHT JUST DIDN'T LAST AND YOU DIDN'T HAVE MONEY TO GET MORE.: NEVER TRUE

## 2024-12-01 SDOH — ECONOMIC STABILITY: FOOD INSECURITY: WITHIN THE PAST 12 MONTHS, YOU WORRIED THAT YOUR FOOD WOULD RUN OUT BEFORE YOU GOT MONEY TO BUY MORE.: NEVER TRUE

## 2024-12-02 ENCOUNTER — OFFICE VISIT (OUTPATIENT)
Dept: PRIMARY CARE CLINIC | Age: 64
End: 2024-12-02

## 2024-12-02 VITALS
OXYGEN SATURATION: 100 % | HEART RATE: 79 BPM | TEMPERATURE: 98.1 F | SYSTOLIC BLOOD PRESSURE: 134 MMHG | BODY MASS INDEX: 31.05 KG/M2 | DIASTOLIC BLOOD PRESSURE: 86 MMHG | RESPIRATION RATE: 16 BRPM | HEIGHT: 76 IN | WEIGHT: 255 LBS

## 2024-12-02 DIAGNOSIS — N18.32 STAGE 3B CHRONIC KIDNEY DISEASE (HCC): ICD-10-CM

## 2024-12-02 DIAGNOSIS — I10 ESSENTIAL (PRIMARY) HYPERTENSION: ICD-10-CM

## 2024-12-02 DIAGNOSIS — E78.5 DYSLIPIDEMIA: ICD-10-CM

## 2024-12-02 DIAGNOSIS — Z00.00 INITIAL MEDICARE ANNUAL WELLNESS VISIT: Primary | ICD-10-CM

## 2024-12-02 PROCEDURE — 3079F DIAST BP 80-89 MM HG: CPT | Performed by: NURSE PRACTITIONER

## 2024-12-02 PROCEDURE — 3075F SYST BP GE 130 - 139MM HG: CPT | Performed by: NURSE PRACTITIONER

## 2024-12-02 PROCEDURE — 1160F RVW MEDS BY RX/DR IN RCRD: CPT | Performed by: NURSE PRACTITIONER

## 2024-12-02 PROCEDURE — 1123F ACP DISCUSS/DSCN MKR DOCD: CPT | Performed by: NURSE PRACTITIONER

## 2024-12-02 PROCEDURE — 1159F MED LIST DOCD IN RCRD: CPT | Performed by: NURSE PRACTITIONER

## 2024-12-02 PROCEDURE — G0438 PPPS, INITIAL VISIT: HCPCS | Performed by: NURSE PRACTITIONER

## 2024-12-02 NOTE — PROGRESS NOTES
(THERAPEUTIC MULTIVITAMIN-MINERALS) tablet Take 1 tablet by mouth daily Yes Provider, MD Cary   Ferrous Gluconate (IRON 27 PO) Take by mouth three times a week Three times a week Yes Provider, MD Cary   calcitRIOL (ROCALTROL) 0.25 MCG capsule TAKE 1 CAPSULE BY MOUTH 5 TIMES A WEEK ON MONDAY, TUESDAY, WEDNESDAY, THURSDAY, FRIDAY  Patient not taking: Reported on 12/2/2024  Chuy Villafana MD       [facility] (Including outside providers/suppliers regularly involved in providing care):   Patient Care Team:  Jana Duff APRN - CNP as PCP - General (Certified Nurse Practitioner)  Jana Duff APRN - CNP as PCP - Empaneled Provider      Reviewed and updated this visit:  Tobacco  Allergies  Meds  Problems  Med Hx  Surg Hx  Soc Hx  Fam Hx

## 2024-12-02 NOTE — PATIENT INSTRUCTIONS
SURVEY:     You may be receiving a survey from [employer] regarding your visit today.     Please complete the survey to enable us to provide the highest quality of care to you and your family.     If you cannot score us a very good on any question, please call the office to discuss how we could have made your experience a very good one.     Thank you,    John Graves, APRN-CNP  Jana Duff, APRN-CNP  Kimberly, LPN  Richelle, CMA  Hank, CMA  Darlin, CMA  Delfina, PCA  Lina, CMA  Makenna, PM           Learning About Vision Tests  What are vision tests?     The four most common vision tests are visual acuity tests, refraction, visual field tests, and color vision tests.  Visual acuity (sharpness) tests  These tests are used:  To see if you need glasses or contact lenses.  To monitor an eye problem.  To check an eye injury.  Visual acuity tests are done as part of routine exams. You may also have this test when you get your 's license or apply for some types of jobs.  Visual field tests  These tests are used:  To check for vision loss in any area of your range of vision.  To screen for certain eye diseases.  To look for nerve damage after a stroke, head injury, or other problem that could reduce blood flow to the brain.  Refraction and color tests  A refraction test is done to find the right prescription for glasses and contact lenses.  A color vision test is done to check for color blindness.  Color vision is often tested as part of a routine exam. You may also have this test when you apply for a job where recognizing different colors is important, such as , electronics, or the .  How are vision tests done?  Visual acuity test   You cover one eye at a time.  You read aloud from a wall chart across the room.  You read aloud from a small card that you hold in your hand.  Refraction   You look into a special device.  The device puts lenses of different strengths in front of each eye to see how

## 2025-01-21 DIAGNOSIS — I10 ESSENTIAL (PRIMARY) HYPERTENSION: ICD-10-CM

## 2025-01-21 RX ORDER — AMLODIPINE BESYLATE 10 MG/1
10 TABLET ORAL DAILY
Qty: 90 TABLET | Refills: 1 | Status: SHIPPED | OUTPATIENT
Start: 2025-01-21

## 2025-01-21 NOTE — TELEPHONE ENCOUNTER
Health Maintenance   Topic Date Due    Diabetes screen  Never done    Pneumococcal 65+ years Vaccine (1 of 1 - PCV) Never done    Colorectal Cancer Screen  05/01/2024    Flu vaccine (1) Never done    COVID-19 Vaccine (3 - 2023-24 season) 09/01/2024    Shingles vaccine (1 of 2) 05/30/2025 (Originally 2/12/2008)    GFR test (Diabetes, CKD 3-4, OR last GFR 15-59)  09/03/2025    Depression Screen  11/28/2025    Lipids  07/12/2029    DTaP/Tdap/Td vaccine (3 - Td or Tdap) 04/28/2031    Respiratory Syncytial Virus (RSV) Pregnant or age 60 yrs+ (1 - 1-dose 75+ series) 02/12/2033    Hepatitis C screen  Completed    Hepatitis A vaccine  Aged Out    Hepatitis B vaccine  Aged Out    Hib vaccine  Aged Out    Polio vaccine  Aged Out    Meningococcal (ACWY) vaccine  Aged Out             (applicable per patient's age: Cancer Screenings, Depression Screening, Fall Risk Screening, Immunizations)    AST (U/L)   Date Value   09/27/2023 15     ALT (U/L)   Date Value   09/27/2023 16     BUN (mg/dL)   Date Value   09/03/2024 21      (goal A1C is < 7)   (goal LDL is <100) need 30-50% reduction from baseline     BP Readings from Last 3 Encounters:   12/02/24 134/86   10/28/24 (!) 134/92   10/02/24 116/80    (goal /80)      All Future Testing planned in [facility]:  Lab Frequency Next Occurrence   Basic Metabolic Panel Once 12/02/2024       Next Visit Date:  Future Appointments   Date Time Provider Department Center   6/2/2025  9:20 AM Jana Duff, APRN - CNP [facility]            Patient Active Problem List:     Bladder obstruction     Acute kidney injury (HCC)     Leg edema, right     Essential hypertension     Normocytic anemia     Stage 3b chronic kidney disease (HCC)

## 2025-03-26 ENCOUNTER — HOSPITAL ENCOUNTER (OUTPATIENT)
Age: 65
Setting detail: SPECIMEN
Discharge: HOME | End: 2025-03-26

## 2025-03-26 DIAGNOSIS — N18.32 STAGE 3B CHRONIC KIDNEY DISEASE (HCC): ICD-10-CM

## 2025-03-26 LAB
ANION GAP SERPL CALCULATED.3IONS-SCNC: 13 MMOL/L (ref 9–16)
BUN SERPL-MCNC: 28 MG/DL (ref 8–23)
CALCIUM SERPL-MCNC: 9.4 MG/DL (ref 8.6–10.4)
CHLORIDE SERPL-SCNC: 101 MMOL/L (ref 98–107)
CO2 SERPL-SCNC: 26 MMOL/L (ref 20–31)
CREAT SERPL-MCNC: 1.6 MG/DL (ref 0.7–1.2)
GFR, ESTIMATED: 47 ML/MIN/1.73M2
GLUCOSE SERPL-MCNC: 69 MG/DL (ref 74–99)
POTASSIUM SERPL-SCNC: 4.2 MMOL/L (ref 3.7–5.3)
SODIUM SERPL-SCNC: 140 MMOL/L (ref 136–145)

## 2025-03-27 ENCOUNTER — RESULTS FOLLOW-UP (OUTPATIENT)
Dept: PRIMARY CARE CLINIC | Age: 65
End: 2025-03-27

## 2025-03-27 NOTE — TELEPHONE ENCOUNTER
LVM to patient with results message. Patient informed to call the office back with any questions.

## 2025-03-27 NOTE — TELEPHONE ENCOUNTER
----- Message from SHARON Ye CNP sent at 3/27/2025  9:38 AM EDT -----  Please notify patient of stable lab results.  Kidney function still mildly decreased but has improved from previous lab draw.  Thanks Jana

## 2025-05-31 SDOH — ECONOMIC STABILITY: TRANSPORTATION INSECURITY
IN THE PAST 12 MONTHS, HAS THE LACK OF TRANSPORTATION KEPT YOU FROM MEDICAL APPOINTMENTS OR FROM GETTING MEDICATIONS?: NO

## 2025-05-31 SDOH — ECONOMIC STABILITY: INCOME INSECURITY: IN THE LAST 12 MONTHS, WAS THERE A TIME WHEN YOU WERE NOT ABLE TO PAY THE MORTGAGE OR RENT ON TIME?: NO

## 2025-05-31 SDOH — ECONOMIC STABILITY: FOOD INSECURITY: WITHIN THE PAST 12 MONTHS, THE FOOD YOU BOUGHT JUST DIDN'T LAST AND YOU DIDN'T HAVE MONEY TO GET MORE.: NEVER TRUE

## 2025-05-31 SDOH — ECONOMIC STABILITY: FOOD INSECURITY: WITHIN THE PAST 12 MONTHS, YOU WORRIED THAT YOUR FOOD WOULD RUN OUT BEFORE YOU GOT MONEY TO BUY MORE.: NEVER TRUE

## 2025-05-31 ASSESSMENT — PATIENT HEALTH QUESTIONNAIRE - PHQ9
SUM OF ALL RESPONSES TO PHQ QUESTIONS 1-9: 0
SUM OF ALL RESPONSES TO PHQ9 QUESTIONS 1 & 2: 0
2. FEELING DOWN, DEPRESSED OR HOPELESS: NOT AT ALL
1. LITTLE INTEREST OR PLEASURE IN DOING THINGS: NOT AT ALL
SUM OF ALL RESPONSES TO PHQ QUESTIONS 1-9: 0
2. FEELING DOWN, DEPRESSED OR HOPELESS: NOT AT ALL
1. LITTLE INTEREST OR PLEASURE IN DOING THINGS: NOT AT ALL

## 2025-06-02 ENCOUNTER — OFFICE VISIT (OUTPATIENT)
Dept: PRIMARY CARE CLINIC | Age: 65
End: 2025-06-02

## 2025-06-02 VITALS
SYSTOLIC BLOOD PRESSURE: 124 MMHG | WEIGHT: 265.4 LBS | HEART RATE: 71 BPM | TEMPERATURE: 98.3 F | OXYGEN SATURATION: 96 % | RESPIRATION RATE: 16 BRPM | DIASTOLIC BLOOD PRESSURE: 80 MMHG | BODY MASS INDEX: 32.31 KG/M2

## 2025-06-02 DIAGNOSIS — N18.32 STAGE 3B CHRONIC KIDNEY DISEASE (HCC): ICD-10-CM

## 2025-06-02 DIAGNOSIS — N18.9 CHRONIC KIDNEY DISEASE, UNSPECIFIED CKD STAGE: ICD-10-CM

## 2025-06-02 DIAGNOSIS — I10 ESSENTIAL (PRIMARY) HYPERTENSION: ICD-10-CM

## 2025-06-02 DIAGNOSIS — E78.5 DYSLIPIDEMIA: Primary | ICD-10-CM

## 2025-06-02 PROCEDURE — 3079F DIAST BP 80-89 MM HG: CPT | Performed by: NURSE PRACTITIONER

## 2025-06-02 PROCEDURE — 99214 OFFICE O/P EST MOD 30 MIN: CPT | Performed by: NURSE PRACTITIONER

## 2025-06-02 PROCEDURE — 3074F SYST BP LT 130 MM HG: CPT | Performed by: NURSE PRACTITIONER

## 2025-06-02 PROCEDURE — 1123F ACP DISCUSS/DSCN MKR DOCD: CPT | Performed by: NURSE PRACTITIONER

## 2025-06-02 PROCEDURE — 1160F RVW MEDS BY RX/DR IN RCRD: CPT | Performed by: NURSE PRACTITIONER

## 2025-06-02 PROCEDURE — 1159F MED LIST DOCD IN RCRD: CPT | Performed by: NURSE PRACTITIONER

## 2025-06-02 ASSESSMENT — ENCOUNTER SYMPTOMS
EYES NEGATIVE: 1
ALLERGIC/IMMUNOLOGIC NEGATIVE: 1
RESPIRATORY NEGATIVE: 1
GASTROINTESTINAL NEGATIVE: 1

## 2025-06-02 NOTE — PATIENT INSTRUCTIONS
SURVEY:     You may be receiving a survey from [facility] regarding your visit today.     Please complete the survey to enable us to provide the highest quality of care to you and your family.     If you cannot score us a very good on any question, please call the office to discuss how we could have made your experience a very good one.     Thank you,    John Graves, APRN-CNP  Jana Duff, APRN-CNP  Kimberly, LPN  Richelle, CMA  Hank, CMA  Darlin, CMA  Delfina, PCA  Lina, CMA  Makenna, PM

## 2025-06-02 NOTE — PROGRESS NOTES
[facility]  CONCHIS CROW CNP  [facility]  [address] [facility] [address]  Dept: [phone]  Dept Fax: [phone]      Name: Manuel Rendon  : 1960         Chief Complaint:     Chief Complaint   Patient presents with    Hypertension     6 month check.     Hyperlipidemia     6 month check.        History of Present Illness:      Manuel Rendon is a 65 y.o.  male who presents with Hypertension (6 month check. ) and Hyperlipidemia (6 month check. )      HPI    Manuel is here today for a routine office visit.  He has been walking and eating better recently.  He does continue to have some right knee and left ankle arthritis pains.  He does not want to get any further work up at this time.  He has continued to hold his Crestor and is due for blood work to re-evaluate.  He denies any new medical concerns today.    Past Medical History:     Past Medical History:   Diagnosis Date    NICOLE (acute kidney injury)     Chronic kidney disease     COVID-19 2021    Family history of thyroid disease     History of pneumonia     Hx of blood clots     BLE DVTs    Hyperlipidemia     Hypertension     Pulmonary embolism (HCC)     \"Approximately 8 years ago.\"     Stage 3b chronic kidney disease (HCC) 2021    pt self caths    Wellness examination     pcp=Farhad [facility] [facility]-last visit 2021    Wellness examination     hematology-Dr Tobin-last visit 2021    Wellness examination     nephrology-Dr Villafana-last visit 2021      Reviewed all health maintenance requirements and ordered appropriate tests  Health Maintenance Due   Topic Date Due    Shingles vaccine (1 of 2) Never done    Pneumococcal 50+ years Vaccine (1 of 1 - PCV) Never done    Colorectal Cancer Screen  2024    COVID-19 Vaccine (3 - 2024- season) 2024       Past Surgical History:     Past Surgical History:   Procedure Laterality Date    CHOLECYSTECTOMY      CYSTOGRAPHY N/A

## 2025-07-18 DIAGNOSIS — I10 ESSENTIAL (PRIMARY) HYPERTENSION: ICD-10-CM

## 2025-07-18 RX ORDER — AMLODIPINE BESYLATE 10 MG/1
10 TABLET ORAL DAILY
Qty: 90 TABLET | Refills: 1 | Status: SHIPPED | OUTPATIENT
Start: 2025-07-18

## 2025-07-18 NOTE — TELEPHONE ENCOUNTER
Health Maintenance   Topic Date Due    Shingles vaccine (1 of 2) Never done    Pneumococcal 50+ years Vaccine (1 of 1 - PCV) Never done    Colorectal Cancer Screen  05/01/2024    COVID-19 Vaccine (3 - 2024-25 season) 09/01/2024    Flu vaccine (1) 08/01/2025    GFR test (Diabetes, CKD 3-4, OR last GFR 15-59)  03/26/2026    Depression Screen  05/31/2026    Lipids  07/12/2029    DTaP/Tdap/Td vaccine (3 - Td or Tdap) 04/28/2031    Respiratory Syncytial Virus (RSV) Pregnant or age 60 yrs+ (1 - 1-dose 75+ series) 02/12/2033    Hepatitis C screen  Completed    Hepatitis A vaccine  Aged Out    Hepatitis B vaccine  Aged Out    Hib vaccine  Aged Out    Polio vaccine  Aged Out    Meningococcal (ACWY) vaccine  Aged Out    Meningococcal B vaccine  Aged Out             (applicable per patient's age: Cancer Screenings, Depression Screening, Fall Risk Screening, Immunizations)    AST (U/L)   Date Value   09/27/2023 15     ALT (U/L)   Date Value   09/27/2023 16     BUN (mg/dL)   Date Value   03/26/2025 28 (H)      (goal A1C is < 7)   (goal LDL is <100) need 30-50% reduction from baseline     BP Readings from Last 3 Encounters:   06/02/25 124/80   12/02/24 134/86   10/28/24 (!) 134/92    (goal /80)      All Future Testing planned in [facility]:  Lab Frequency Next Occurrence   Lipid Panel Once 06/02/2025   Basic Metabolic Panel, Fasting Once 06/02/2025       Next Visit Date:  Future Appointments   Date Time Provider Department Center   12/3/2025  9:20 AM Jana Duff, APRN - CNP [facility]            Patient Active Problem List:     Bladder obstruction     Acute kidney injury     Leg edema, right     Essential hypertension     Normocytic anemia     Stage 3b chronic kidney disease (HCC)

## 2025-07-25 ENCOUNTER — HOSPITAL ENCOUNTER (OUTPATIENT)
Age: 65
Discharge: HOME | End: 2025-07-25
Payer: MEDICARE

## 2025-07-25 DIAGNOSIS — I10 ESSENTIAL (PRIMARY) HYPERTENSION: ICD-10-CM

## 2025-07-25 DIAGNOSIS — E78.5 DYSLIPIDEMIA: ICD-10-CM

## 2025-07-25 DIAGNOSIS — N18.9 CHRONIC KIDNEY DISEASE, UNSPECIFIED CKD STAGE: ICD-10-CM

## 2025-07-25 LAB
ANION GAP SERPL CALCULATED.3IONS-SCNC: 12 MMOL/L (ref 9–16)
BUN SERPL-MCNC: 22 MG/DL (ref 8–23)
BUN/CREAT SERPL: 13 (ref 9–20)
CALCIUM SERPL-MCNC: 8.9 MG/DL (ref 8.6–10.4)
CHLORIDE SERPL-SCNC: 103 MMOL/L (ref 98–107)
CHOLEST SERPL-MCNC: 217 MG/DL (ref 0–199)
CHOLESTEROL/HDL RATIO: 5.2
CO2 SERPL-SCNC: 23 MMOL/L (ref 20–31)
CREAT SERPL-MCNC: 1.7 MG/DL (ref 0.7–1.2)
GFR, ESTIMATED: 45 ML/MIN/1.73M2
GLUCOSE P FAST SERPL-MCNC: 94 MG/DL (ref 74–99)
HDLC SERPL-MCNC: 42 MG/DL
LDLC SERPL CALC-MCNC: 147 MG/DL (ref 0–100)
POTASSIUM SERPL-SCNC: 3.6 MMOL/L (ref 3.7–5.3)
SODIUM SERPL-SCNC: 138 MMOL/L (ref 136–145)
TRIGL SERPL-MCNC: 141 MG/DL
VLDLC SERPL CALC-MCNC: 28 MG/DL (ref 1–30)

## 2025-07-25 PROCEDURE — 80061 LIPID PANEL: CPT

## 2025-07-25 PROCEDURE — 36415 COLL VENOUS BLD VENIPUNCTURE: CPT

## 2025-07-25 PROCEDURE — 80048 BASIC METABOLIC PNL TOTAL CA: CPT

## 2025-08-04 ENCOUNTER — RESULTS FOLLOW-UP (OUTPATIENT)
Dept: PRIMARY CARE CLINIC | Age: 65
End: 2025-08-04

## 2025-08-04 DIAGNOSIS — N18.32 STAGE 3B CHRONIC KIDNEY DISEASE (HCC): Primary | ICD-10-CM

## (undated) DEVICE — SUTURE VIC + BR UD PS2 4-0 18IN VCP496G

## (undated) DEVICE — TOWEL,OR,DSP,ST,NATURAL,DLX,4/PK,20PK/CS: Brand: MEDLINE

## (undated) DEVICE — SEAL

## (undated) DEVICE — GLOVE ORANGE PI 7 1/2   MSG9075

## (undated) DEVICE — 3M™ TEGADERM™ +PAD FILM DRESSING WITH NON-ADHERENT PAD, 3587, 3-1/2 IN X 4-1/8 IN (9 CM X 10.5 CM), 25/CAR, 4 CAR/CS: Brand: 3M™ TEGADERM™

## (undated) DEVICE — GOWN,SIRUS,POLYRNF,BRTHSLV,2XL,18/CS: Brand: MEDLINE

## (undated) DEVICE — MERCY TIFFIN BASIC LAP-LF: Brand: MEDLINE INDUSTRIES, INC.

## (undated) DEVICE — Z DISCONTINUED BY MEDLINE USE 2711682 TRAY SKIN PREP DRY W/ PREM GLV

## (undated) DEVICE — PLUMEPORT ACTIV LAPAROSCOPIC SMOKE FILTRATION DEVICE: Brand: PLUMEPORT ACTIVE

## (undated) DEVICE — SOLUTION SCRB 4OZ 4% CHG H2O AIDED FOR PREOPERATIVE SKIN

## (undated) DEVICE — SWABSTICK MEDICATED 10% POVIDONE IOD PVP SGL ANTISEP SAT

## (undated) DEVICE — DRAPE,REIN 53X77,STERILE: Brand: MEDLINE

## (undated) DEVICE — PROTECTOR ULN NRV PUR FOAM HK LOOP STRP ANATOMICALLY

## (undated) DEVICE — SHEET,DRAPE,53X77,STERILE: Brand: MEDLINE

## (undated) DEVICE — TROCARS: Brand: KII® BALLOON BLUNT TIP SYSTEM

## (undated) DEVICE — GLOVE ORANGE PI 8   MSG9080

## (undated) DEVICE — ENDOSCOPIC KIT CLN SWAB MICROFIBER CLTH SCP CLEANOR DISP

## (undated) DEVICE — ELECTRODE EMG GEL PTCH W/ WIRE NEOTRODE II URODYN

## (undated) DEVICE — SOLUTION IRRIG 1000ML 0.9% SOD CHL USP POUR PLAS BTL

## (undated) DEVICE — Y-TYPE TUR/BLADDER IRRIGATION SET, REGULATING CLAMP

## (undated) DEVICE — SUTURE ABSRB L12IN L12MM SZ 2-0 GS-22 VLT GLYCOLIDE VLOCM2115

## (undated) DEVICE — Z DUP USE 2522782 SOLUTION IRRIG 1000ML STRL H2O PLAS CONTAINER UROMATIC

## (undated) DEVICE — SUTURE DEV SZ 0 L6IN N ABSORBABLE

## (undated) DEVICE — Device: Brand: AIR-CHARGED SINGLE SENSOR COUDÉ CATHETER

## (undated) DEVICE — MARKER,SKIN,WI/RULER AND LABELS: Brand: MEDLINE

## (undated) DEVICE — GOWN,SIRUS,NONRNF,SETINSLV,XL,20/CS: Brand: MEDLINE

## (undated) DEVICE — C-ARM: Brand: UNBRANDED

## (undated) DEVICE — BLADELESS OBTURATOR: Brand: WECK VISTA

## (undated) DEVICE — BLADELESS OBTURATOR, LONG: Brand: WECK VISTA

## (undated) DEVICE — BAG,LEG,COMFORT-STRAPS,MEDIUM,20OZ: Brand: MEDLINE

## (undated) DEVICE — CATHETER URODYN AIR CHARGED ABD SENSOR

## (undated) DEVICE — SYRINGE, LUER LOCK, 10ML: Brand: MEDLINE

## (undated) DEVICE — PENCIL SMK EVAC TELSCP 3 M TBNG

## (undated) DEVICE — CATHETER URETH 16FR BLLN 5CC SIL ALLY W/ SIL HYDRGEL 2 W F

## (undated) DEVICE — ELECTRO LUBE IS A SINGLE PATIENT USE DEVICE THAT IS INTENDED TO BE USED ON ELECTROSURGICAL ELECTRODES TO REDUCE STICKING.: Brand: KEY SURGICAL ELECTRO LUBE

## (undated) DEVICE — Device: Brand: PUMP TUBING INFUSION LINE

## (undated) DEVICE — ARM DRAPE

## (undated) DEVICE — GLOVE ORANGE PI 8 1/2   MSG9085

## (undated) DEVICE — PAD PT POS 36 IN SURGYPAD DISP

## (undated) DEVICE — GLOVE SURG SZ 65 THK91MIL LTX FREE SYN POLYISOPRENE

## (undated) DEVICE — GLOVE ORANGE PI 7   MSG9070